# Patient Record
Sex: FEMALE | Race: WHITE | NOT HISPANIC OR LATINO
[De-identification: names, ages, dates, MRNs, and addresses within clinical notes are randomized per-mention and may not be internally consistent; named-entity substitution may affect disease eponyms.]

---

## 2016-08-02 RX ORDER — DOCUSATE SODIUM 100 MG
1 CAPSULE ORAL
Qty: 0 | Refills: 0 | DISCHARGE
Start: 2016-08-02

## 2017-01-06 ENCOUNTER — RX RENEWAL (OUTPATIENT)
Age: 83
End: 2017-01-06

## 2017-05-08 ENCOUNTER — RX RENEWAL (OUTPATIENT)
Age: 83
End: 2017-05-08

## 2017-05-26 ENCOUNTER — APPOINTMENT (OUTPATIENT)
Dept: ORTHOPEDIC SURGERY | Facility: CLINIC | Age: 83
End: 2017-05-26

## 2017-05-26 VITALS
HEART RATE: 72 BPM | SYSTOLIC BLOOD PRESSURE: 132 MMHG | DIASTOLIC BLOOD PRESSURE: 81 MMHG | BODY MASS INDEX: 26.66 KG/M2 | WEIGHT: 160 LBS | HEIGHT: 65 IN

## 2017-05-26 DIAGNOSIS — Z78.9 OTHER SPECIFIED HEALTH STATUS: ICD-10-CM

## 2017-05-26 DIAGNOSIS — Z82.62 FAMILY HISTORY OF OSTEOPOROSIS: ICD-10-CM

## 2017-05-26 RX ORDER — MELOXICAM 15 MG/1
15 TABLET ORAL DAILY
Qty: 30 | Refills: 1 | Status: ACTIVE | COMMUNITY
Start: 2017-05-26 | End: 1900-01-01

## 2017-09-01 ENCOUNTER — APPOINTMENT (OUTPATIENT)
Dept: ORTHOPEDIC SURGERY | Facility: CLINIC | Age: 83
End: 2017-09-01
Payer: MEDICARE

## 2017-09-01 VITALS
HEIGHT: 65 IN | WEIGHT: 160 LBS | BODY MASS INDEX: 26.66 KG/M2 | SYSTOLIC BLOOD PRESSURE: 156 MMHG | HEART RATE: 71 BPM | DIASTOLIC BLOOD PRESSURE: 78 MMHG

## 2017-09-01 DIAGNOSIS — M51.37 OTHER INTERVERTEBRAL DISC DEGENERATION, LUMBOSACRAL REGION: ICD-10-CM

## 2017-09-01 PROCEDURE — 99214 OFFICE O/P EST MOD 30 MIN: CPT

## 2017-09-01 PROCEDURE — 73502 X-RAY EXAM HIP UNI 2-3 VIEWS: CPT | Mod: RT

## 2019-06-28 ENCOUNTER — EMERGENCY (EMERGENCY)
Facility: HOSPITAL | Age: 85
LOS: 1 days | Discharge: HOME | End: 2019-06-28
Attending: EMERGENCY MEDICINE | Admitting: EMERGENCY MEDICINE
Payer: MEDICARE

## 2019-06-28 VITALS
TEMPERATURE: 99 F | RESPIRATION RATE: 15 BRPM | SYSTOLIC BLOOD PRESSURE: 151 MMHG | OXYGEN SATURATION: 95 % | WEIGHT: 160.06 LBS | HEIGHT: 65 IN | DIASTOLIC BLOOD PRESSURE: 73 MMHG | HEART RATE: 70 BPM

## 2019-06-28 DIAGNOSIS — H26.493 OTHER SECONDARY CATARACT, BILATERAL: Chronic | ICD-10-CM

## 2019-06-28 DIAGNOSIS — Z90.721 ACQUIRED ABSENCE OF OVARIES, UNILATERAL: Chronic | ICD-10-CM

## 2019-06-28 DIAGNOSIS — Z98.89 OTHER SPECIFIED POSTPROCEDURAL STATES: Chronic | ICD-10-CM

## 2019-06-28 DIAGNOSIS — R19.00 INTRA-ABDOMINAL AND PELVIC SWELLING, MASS AND LUMP, UNSPECIFIED SITE: Chronic | ICD-10-CM

## 2019-06-28 LAB
BASOPHILS # BLD AUTO: 0.04 K/UL — SIGNIFICANT CHANGE UP (ref 0–0.2)
BASOPHILS NFR BLD AUTO: 0.5 % — SIGNIFICANT CHANGE UP (ref 0–2)
EOSINOPHIL # BLD AUTO: 0.34 K/UL — SIGNIFICANT CHANGE UP (ref 0–0.5)
EOSINOPHIL NFR BLD AUTO: 4 % — SIGNIFICANT CHANGE UP (ref 0–6)
HCT VFR BLD CALC: 35.8 % — SIGNIFICANT CHANGE UP (ref 34.5–45)
HGB BLD-MCNC: 12 G/DL — SIGNIFICANT CHANGE UP (ref 11.5–15.5)
IMM GRANULOCYTES NFR BLD AUTO: 0.4 % — SIGNIFICANT CHANGE UP (ref 0–1.5)
LYMPHOCYTES # BLD AUTO: 2.01 K/UL — SIGNIFICANT CHANGE UP (ref 1–3.3)
LYMPHOCYTES # BLD AUTO: 23.9 % — SIGNIFICANT CHANGE UP (ref 13–44)
MCHC RBC-ENTMCNC: 29.9 PG — SIGNIFICANT CHANGE UP (ref 27–34)
MCHC RBC-ENTMCNC: 33.5 GM/DL — SIGNIFICANT CHANGE UP (ref 32–36)
MCV RBC AUTO: 89.1 FL — SIGNIFICANT CHANGE UP (ref 80–100)
MONOCYTES # BLD AUTO: 0.86 K/UL — SIGNIFICANT CHANGE UP (ref 0–0.9)
MONOCYTES NFR BLD AUTO: 10.2 % — SIGNIFICANT CHANGE UP (ref 2–14)
NEUTROPHILS # BLD AUTO: 5.13 K/UL — SIGNIFICANT CHANGE UP (ref 1.8–7.4)
NEUTROPHILS NFR BLD AUTO: 61 % — SIGNIFICANT CHANGE UP (ref 43–77)
NRBC # BLD: 0 /100 WBCS — SIGNIFICANT CHANGE UP (ref 0–0)
PLATELET # BLD AUTO: 224 K/UL — SIGNIFICANT CHANGE UP (ref 150–400)
RBC # BLD: 4.02 M/UL — SIGNIFICANT CHANGE UP (ref 3.8–5.2)
RBC # FLD: 14.1 % — SIGNIFICANT CHANGE UP (ref 10.3–14.5)
WBC # BLD: 8.41 K/UL — SIGNIFICANT CHANGE UP (ref 3.8–10.5)
WBC # FLD AUTO: 8.41 K/UL — SIGNIFICANT CHANGE UP (ref 3.8–10.5)

## 2019-06-28 PROCEDURE — 99284 EMERGENCY DEPT VISIT MOD MDM: CPT

## 2019-06-28 RX ORDER — SODIUM CHLORIDE 9 MG/ML
3 INJECTION INTRAMUSCULAR; INTRAVENOUS; SUBCUTANEOUS ONCE
Refills: 0 | Status: COMPLETED | OUTPATIENT
Start: 2019-06-28 | End: 2019-06-28

## 2019-06-28 RX ADMIN — SODIUM CHLORIDE 3 MILLILITER(S): 9 INJECTION INTRAMUSCULAR; INTRAVENOUS; SUBCUTANEOUS at 23:54

## 2019-06-28 NOTE — ED ADULT NURSE NOTE - NSIMPLEMENTINTERV_GEN_ALL_ED
Implemented All Fall with Harm Risk Interventions:  [place] to call system. Call bell, personal items and telephone within reach. Instruct patient to call for assistance. Room bathroom lighting operational. Non-slip footwear when patient is off stretcher. Physically safe environment: no spills, clutter or unnecessary equipment. Stretcher in lowest position, wheels locked, appropriate side rails in place. Provide visual cue, wrist band, yellow gown, etc. Monitor gait and stability. Monitor for mental status changes and reorient to person, place, and time. Review medications for side effects contributing to fall risk. Reinforce activity limits and safety measures with patient and family. Provide visual clues: red socks.

## 2019-06-29 VITALS
TEMPERATURE: 99 F | DIASTOLIC BLOOD PRESSURE: 70 MMHG | OXYGEN SATURATION: 95 % | RESPIRATION RATE: 16 BRPM | HEART RATE: 66 BPM | SYSTOLIC BLOOD PRESSURE: 132 MMHG

## 2019-06-29 LAB
ALBUMIN SERPL ELPH-MCNC: 3.7 G/DL — SIGNIFICANT CHANGE UP (ref 3.3–5)
ALP SERPL-CCNC: 94 U/L — SIGNIFICANT CHANGE UP (ref 40–120)
ALT FLD-CCNC: 26 U/L — SIGNIFICANT CHANGE UP (ref 12–78)
ANION GAP SERPL CALC-SCNC: 9 MMOL/L — SIGNIFICANT CHANGE UP (ref 5–17)
APTT BLD: 33.2 SEC — SIGNIFICANT CHANGE UP (ref 28.5–37)
AST SERPL-CCNC: 20 U/L — SIGNIFICANT CHANGE UP (ref 15–37)
BILIRUB SERPL-MCNC: 0.5 MG/DL — SIGNIFICANT CHANGE UP (ref 0.2–1.2)
BUN SERPL-MCNC: 18 MG/DL — SIGNIFICANT CHANGE UP (ref 7–23)
CALCIUM SERPL-MCNC: 8.5 MG/DL — SIGNIFICANT CHANGE UP (ref 8.5–10.1)
CHLORIDE SERPL-SCNC: 103 MMOL/L — SIGNIFICANT CHANGE UP (ref 96–108)
CO2 SERPL-SCNC: 25 MMOL/L — SIGNIFICANT CHANGE UP (ref 22–31)
CREAT SERPL-MCNC: 0.97 MG/DL — SIGNIFICANT CHANGE UP (ref 0.5–1.3)
GLUCOSE SERPL-MCNC: 104 MG/DL — HIGH (ref 70–99)
INR BLD: 1.52 RATIO — HIGH (ref 0.88–1.16)
LIDOCAIN IGE QN: 212 U/L — SIGNIFICANT CHANGE UP (ref 73–393)
OB PNL STL: POSITIVE
POTASSIUM SERPL-MCNC: 3.7 MMOL/L — SIGNIFICANT CHANGE UP (ref 3.5–5.3)
POTASSIUM SERPL-SCNC: 3.7 MMOL/L — SIGNIFICANT CHANGE UP (ref 3.5–5.3)
PROT SERPL-MCNC: 7.7 G/DL — SIGNIFICANT CHANGE UP (ref 6–8.3)
PROTHROM AB SERPL-ACNC: 17.5 SEC — HIGH (ref 10–12.9)
SODIUM SERPL-SCNC: 137 MMOL/L — SIGNIFICANT CHANGE UP (ref 135–145)

## 2019-06-29 PROCEDURE — 83690 ASSAY OF LIPASE: CPT

## 2019-06-29 PROCEDURE — 71250 CT THORAX DX C-: CPT

## 2019-06-29 PROCEDURE — 86900 BLOOD TYPING SEROLOGIC ABO: CPT

## 2019-06-29 PROCEDURE — 85027 COMPLETE CBC AUTOMATED: CPT

## 2019-06-29 PROCEDURE — 70450 CT HEAD/BRAIN W/O DYE: CPT

## 2019-06-29 PROCEDURE — 82272 OCCULT BLD FECES 1-3 TESTS: CPT

## 2019-06-29 PROCEDURE — 85730 THROMBOPLASTIN TIME PARTIAL: CPT

## 2019-06-29 PROCEDURE — 74176 CT ABD & PELVIS W/O CONTRAST: CPT

## 2019-06-29 PROCEDURE — 71250 CT THORAX DX C-: CPT | Mod: 26

## 2019-06-29 PROCEDURE — 85610 PROTHROMBIN TIME: CPT

## 2019-06-29 PROCEDURE — 86901 BLOOD TYPING SEROLOGIC RH(D): CPT

## 2019-06-29 PROCEDURE — 74176 CT ABD & PELVIS W/O CONTRAST: CPT | Mod: 26

## 2019-06-29 PROCEDURE — 80053 COMPREHEN METABOLIC PANEL: CPT

## 2019-06-29 PROCEDURE — 70450 CT HEAD/BRAIN W/O DYE: CPT | Mod: 26

## 2019-06-29 PROCEDURE — 36415 COLL VENOUS BLD VENIPUNCTURE: CPT

## 2019-06-29 PROCEDURE — 99284 EMERGENCY DEPT VISIT MOD MDM: CPT | Mod: 25

## 2019-06-29 PROCEDURE — 86850 RBC ANTIBODY SCREEN: CPT

## 2019-06-29 NOTE — ED PROVIDER NOTE - NSFOLLOWUPINSTRUCTIONS_ED_ALL_ED_FT
Return to the ED for any new or worsening symptoms  Take your medication as prescribed  Incentive spirometer 10 times an hour as instructed   Follow up with your PMD in 2-3 days for a recheck   Do not strain to have a bowel movement   Advance activity as tolerated

## 2019-06-29 NOTE — ED PROVIDER NOTE - CLINICAL SUMMARY MEDICAL DECISION MAKING FREE TEXT BOX
Pt with recent fall on Xarelto now with rectal bleeding and chest wall contusion.  Will obtain screening labs check stool for occult blood, and obtain CT head, chest/abd/pelvis.  Will monitor pt

## 2019-06-29 NOTE — ED PROVIDER NOTE - PROGRESS NOTE DETAILS
Results of labs and images reviewed, copy provided, all questions answered.  Pt and family aware of renal cyst which was present on prior imaging.  Bleeding has now stopped.  May be secondary to external hemorrhoid.  Pt stable at this time for discharge and outpatient follow up

## 2019-06-29 NOTE — ED PROVIDER NOTE - PSH
Abdominal wall mass  Exp La, Excison of abdominal mass - 8/2015  After cataract not obscuring vision, bilateral  b/l lens implants  S/P oophorectomy    S/P tonsillectomy

## 2019-06-29 NOTE — ED PROVIDER NOTE - PHYSICAL EXAMINATION
External non thrombosed hemorrhoid noted no active bleeding noted on rectal exam   No vaginal bleeding noted   Healing contusion to right lateral chest   Healing contusion to right orbital region no TTP to bilateral orbital regions   EOMI with no pain on eye movement

## 2019-06-29 NOTE — ED PROVIDER NOTE - OBJECTIVE STATEMENT
Pt is a 89 yo female who presents to the ED with a cc of rectal bleeding.  PMHx of Afib on Xarelto, GERD, hypothyroidism. Pt reports that several days ago she suffered a mechanical fall landing on her right side and striking her head.  Denies LOC.  Pt did not seek medical attention for this at the time.  Reports that today while having a bowel movement she noted several episodes of BRBPR.  She reports that this occurred 2-3 times today and so she became concerned and came to the ED for further work up.  Pt reports that on arrival to the ED she had a bowel movement with no blood.  Denies lightheadedness, N/V, SOB, abd pain.  Pt does have some right lateral chest wall pain from the fall several days ago.  Denies vaginal bleeding or discharge

## 2019-06-29 NOTE — ED ADULT NURSE REASSESSMENT NOTE - NS ED NURSE REASSESS COMMENT FT1
Incentive spirometer provided. Education and return demonstration for incentive spirometer use completed.

## 2020-07-23 ENCOUNTER — APPOINTMENT (OUTPATIENT)
Dept: ORTHOPEDIC SURGERY | Facility: CLINIC | Age: 86
End: 2020-07-23
Payer: MEDICARE

## 2020-07-23 VITALS — HEART RATE: 80 BPM | DIASTOLIC BLOOD PRESSURE: 68 MMHG | TEMPERATURE: 97.8 F | SYSTOLIC BLOOD PRESSURE: 154 MMHG

## 2020-07-23 PROCEDURE — 73502 X-RAY EXAM HIP UNI 2-3 VIEWS: CPT | Mod: RT

## 2020-07-23 PROCEDURE — 99214 OFFICE O/P EST MOD 30 MIN: CPT

## 2020-07-23 PROCEDURE — 73562 X-RAY EXAM OF KNEE 3: CPT | Mod: RT

## 2020-07-23 NOTE — CONSULT LETTER
[Dear  ___] : Dear  [unfilled], [Consult Letter:] : I had the pleasure of evaluating your patient, [unfilled]. [Please see my note below.] : Please see my note below. [Consult Closing:] : Thank you very much for allowing me to participate in the care of this patient.  If you have any questions, please do not hesitate to contact me. [FreeTextEntry2] : STEVEN GOLDBERG\par  [Sincerely,] : Sincerely, [FreeTextEntry3] : Juan M Carrillo MD\par \par ______________________________________________\par [facility]: Hip/Knee Arthroplasty\par [address], [address], [place] [address]\par (t) [phone]\par (f) [phone]\par

## 2020-07-23 NOTE — HISTORY OF PRESENT ILLNESS
[Constant] : ~He/She~ states the symptoms seem to be constant [Worsening] : worsening [Rest] : relieved by rest [Walking] : worsened by walking [de-identified] : Ms. TYRON FORTUNE is a 89 year old female presents with her son for evaluation, for a new onset of right knee pain. She notes she suffered a fall when getting out of bed on Sunday night. She notes she does not recall the fall. She was seen in urgent care on Monday, and was diagnosed with a probable tibial plateau fracture. She has continued pain localized to the medial aspect of the knee. She has been ambulating with a walker, with limited walking distance. She is status post right total hip replacement 8/2016, with no pain to the hip. \par

## 2020-07-23 NOTE — PHYSICAL EXAM
[de-identified] : On general examination the patient is adequately groomed and nourished. The vital parameters are as recorded. \par There is no lymphedema or diffuse swelling, no varicose veins, no skin warmth/erythema/scars/swelling, no ulcers and no palpable lymph nodes or masses in both lower extremities. Bilateral pedal pulses are well palpable.\par Upper Extremity:\par Both right and left upper extremities are unremarkable in terms of skin rash, lesions, pigmentation, redness, tenderness, swelling, joint instability, abnormal deformity or crepitus. The overall range of motion, sensation, motor tone and strength testing are normal.\par \par right  hip: There is a well healed scar of surgery with no significant swelling, redness, or tenderness. Range of motion of the hip: active SLR of 30 degrees and hip flexion to 60 degrees Abduction 30 degrees adduction 15 degrees external rotation 30 degrees internal rotation 15 degrees with hip abductor extensor power grade +4. \par \par Knee Exam\par The gait is right stiff knee antalgic.\par Knee alignment:   normal\par Both knees demonstrate no scars and the skin has no warmth, erythema, swelling or tenderness. \par Both knees have a range of motion of\par Extension:                    left 0 degrees             right -5 degrees\par Flexion:                                   left 125 degrees          right 90 degrees\par the range of motion of the right knee is limited to pain. \par Right Knee: There is medial joint line tenderness, tenderness along the medial tibial plateau. There is mild effusion. \par There is no mediolateral laxity and no anteroposterior instability. \par Patella compression test is negative and patellofemoral tracking is normal with no lateral subluxation, apprehension or instability. \par Left knee quadriceps and hamstrings power is 5. left Knee Exam is normal.\par Right knee quadriceps and hamstrings power is 4+.\par \par  [de-identified] : The following radiographs were ordered and read by me during this patients visit. I reviewed each radiograph in detail with the patient and discussed the findings as highlighted below.\par AP and false profile views of the right hip and AP view of the pelvis taken today reveal a well fixed and aligned right total hip replacement with no signs of mechanical failure or periprosthetic fracture.\par AP lateral and skyline view of the right knee reveal nondisplaced tibial plateau fracture. \par

## 2020-07-23 NOTE — DISCUSSION/SUMMARY
[de-identified] : Stable right total hip replacement, with right knee medial tibial plateau. \par The patient was informed of the findings and reassured her hip replacement remains stable. She has suffered a right knee tibial plateau fracture following her fall. She was recommended for conservative management of the fracture at this time, as it is non displaced. She will continue with WBAT with the walker, and was recommended for fitting for a bladimir brace to be worn at all times. She has been provided with a prescription for the brace, with range of motion set at 0-70. \par Follow-up appointment was recommended in six weeks for repeat xrays to assess for healing. \par \par

## 2020-08-25 RX ORDER — TRAMADOL HYDROCHLORIDE 50 MG/1
50 TABLET, COATED ORAL 3 TIMES DAILY
Qty: 21 | Refills: 0 | Status: ACTIVE | OUTPATIENT
Start: 2020-08-06

## 2020-08-31 ENCOUNTER — APPOINTMENT (OUTPATIENT)
Dept: ORTHOPEDIC SURGERY | Facility: CLINIC | Age: 86
End: 2020-08-31
Payer: MEDICARE

## 2020-08-31 ENCOUNTER — INPATIENT (INPATIENT)
Facility: HOSPITAL | Age: 86
LOS: 1 days | Discharge: NURSING, REHAB OR LONG-TERM CARE | End: 2020-09-02
Attending: HOSPITALIST | Admitting: HOSPITALIST
Payer: MEDICARE

## 2020-08-31 VITALS
DIASTOLIC BLOOD PRESSURE: 75 MMHG | SYSTOLIC BLOOD PRESSURE: 159 MMHG | RESPIRATION RATE: 16 BRPM | OXYGEN SATURATION: 95 % | HEART RATE: 78 BPM | TEMPERATURE: 98 F

## 2020-08-31 VITALS
HEART RATE: 76 BPM | OXYGEN SATURATION: 92 % | HEIGHT: 65 IN | WEIGHT: 168 LBS | SYSTOLIC BLOOD PRESSURE: 168 MMHG | BODY MASS INDEX: 27.99 KG/M2 | DIASTOLIC BLOOD PRESSURE: 74 MMHG

## 2020-08-31 DIAGNOSIS — M25.551 PAIN IN RIGHT HIP: ICD-10-CM

## 2020-08-31 DIAGNOSIS — H26.493 OTHER SECONDARY CATARACT, BILATERAL: Chronic | ICD-10-CM

## 2020-08-31 DIAGNOSIS — Z90.721 ACQUIRED ABSENCE OF OVARIES, UNILATERAL: Chronic | ICD-10-CM

## 2020-08-31 DIAGNOSIS — M25.561 PAIN IN RIGHT KNEE: ICD-10-CM

## 2020-08-31 DIAGNOSIS — R19.00 INTRA-ABDOMINAL AND PELVIC SWELLING, MASS AND LUMP, UNSPECIFIED SITE: Chronic | ICD-10-CM

## 2020-08-31 DIAGNOSIS — Z98.89 OTHER SPECIFIED POSTPROCEDURAL STATES: Chronic | ICD-10-CM

## 2020-08-31 DIAGNOSIS — M25.562 PAIN IN RIGHT KNEE: ICD-10-CM

## 2020-08-31 PROCEDURE — 73552 X-RAY EXAM OF FEMUR 2/>: CPT | Mod: 26,RT

## 2020-08-31 PROCEDURE — 93970 EXTREMITY STUDY: CPT | Mod: 26

## 2020-08-31 PROCEDURE — 99214 OFFICE O/P EST MOD 30 MIN: CPT | Mod: PD

## 2020-08-31 PROCEDURE — 72192 CT PELVIS W/O DYE: CPT | Mod: 26

## 2020-08-31 PROCEDURE — 72170 X-RAY EXAM OF PELVIS: CPT | Mod: PD

## 2020-08-31 PROCEDURE — 73502 X-RAY EXAM HIP UNI 2-3 VIEWS: CPT | Mod: 26,RT

## 2020-08-31 PROCEDURE — 70450 CT HEAD/BRAIN W/O DYE: CPT | Mod: 26

## 2020-08-31 PROCEDURE — 99285 EMERGENCY DEPT VISIT HI MDM: CPT

## 2020-08-31 PROCEDURE — 73700 CT LOWER EXTREMITY W/O DYE: CPT | Mod: 26,RT

## 2020-08-31 PROCEDURE — 73560 X-RAY EXAM OF KNEE 1 OR 2: CPT | Mod: RT,PD

## 2020-08-31 PROCEDURE — 72125 CT NECK SPINE W/O DYE: CPT | Mod: 26

## 2020-08-31 PROCEDURE — 76882 US LMTD JT/FCL EVL NVASC XTR: CPT | Mod: 26,RT

## 2020-08-31 RX ORDER — HALOPERIDOL DECANOATE 100 MG/ML
2 INJECTION INTRAMUSCULAR ONCE
Refills: 0 | Status: COMPLETED | OUTPATIENT
Start: 2020-08-31 | End: 2020-08-31

## 2020-08-31 RX ORDER — MIDAZOLAM HYDROCHLORIDE 1 MG/ML
1 INJECTION, SOLUTION INTRAMUSCULAR; INTRAVENOUS ONCE
Refills: 0 | Status: DISCONTINUED | OUTPATIENT
Start: 2020-08-31 | End: 2020-08-31

## 2020-08-31 RX ORDER — ONDANSETRON 8 MG/1
4 TABLET, FILM COATED ORAL ONCE
Refills: 0 | Status: COMPLETED | OUTPATIENT
Start: 2020-08-31 | End: 2020-08-31

## 2020-08-31 RX ORDER — MORPHINE SULFATE 50 MG/1
2 CAPSULE, EXTENDED RELEASE ORAL ONCE
Refills: 0 | Status: DISCONTINUED | OUTPATIENT
Start: 2020-08-31 | End: 2020-08-31

## 2020-08-31 RX ORDER — MORPHINE SULFATE 50 MG/1
4 CAPSULE, EXTENDED RELEASE ORAL ONCE
Refills: 0 | Status: DISCONTINUED | OUTPATIENT
Start: 2020-08-31 | End: 2020-08-31

## 2020-08-31 RX ADMIN — MORPHINE SULFATE 2 MILLIGRAM(S): 50 CAPSULE, EXTENDED RELEASE ORAL at 15:03

## 2020-08-31 RX ADMIN — MIDAZOLAM HYDROCHLORIDE 1 MILLIGRAM(S): 1 INJECTION, SOLUTION INTRAMUSCULAR; INTRAVENOUS at 18:51

## 2020-08-31 RX ADMIN — MORPHINE SULFATE 4 MILLIGRAM(S): 50 CAPSULE, EXTENDED RELEASE ORAL at 21:04

## 2020-08-31 RX ADMIN — MORPHINE SULFATE 4 MILLIGRAM(S): 50 CAPSULE, EXTENDED RELEASE ORAL at 20:34

## 2020-08-31 RX ADMIN — ONDANSETRON 4 MILLIGRAM(S): 8 TABLET, FILM COATED ORAL at 15:03

## 2020-08-31 RX ADMIN — HALOPERIDOL DECANOATE 2 MILLIGRAM(S): 100 INJECTION INTRAMUSCULAR at 19:37

## 2020-08-31 RX ADMIN — MIDAZOLAM HYDROCHLORIDE 1 MILLIGRAM(S): 1 INJECTION, SOLUTION INTRAMUSCULAR; INTRAVENOUS at 18:21

## 2020-08-31 NOTE — ED PROVIDER NOTE - MUSCULOSKELETAL MINIMAL EXAM
RANGE OF MOTION LIMITED/right hip held in internal rotation, TTP at hip. Sensation intact. 2+ pedal pulse

## 2020-08-31 NOTE — HISTORY OF PRESENT ILLNESS
[FreeTextEntry1] : This is a 89-year-old female with a history of A. fib on Xarelto, recent right tibial plateau fracture treated nonoperatively (7/23/2020), status post right anterior total hip arthroplasty (8/2016, Dr. Carrillo) who is presenting with acute right-sided hip pain after she rolled out of bed this morning landing on her right hip.  After the fall it is unclear if she was able to walk.  She presents today in a wheelchair.  She has severe pain over the right hip and is holding her right lower extremity and significant internal rotation.  The pain radiates into her groin.  She has mild pain over her right knee.  She denies any other injuries or head trauma.

## 2020-08-31 NOTE — PHYSICAL EXAM
[FreeTextEntry1] : General: Patient alert and oriented to person, place and time. Sitting in a wheelchair, with RLE internally rotated, in pain.\par Psychiatric: normal mood and affect, no abnormal movements or speech patterns.\par Eyes: vision intact without deficits, sclera and conjunctiva were normal, pupils were equal in size. \par ENT: Ears and nose were normal in appearance. No thyromegaly.\par Lymph: no enlarged nodes, no lymphedema in extremity.\par Respiratory: Normal respiratory rhythm and effort. No wheezing detected without auscultation. No shortness of breath or respiratory distress.\par Cardiac: no cardiac related leg swelling, 2+ peripheral pulses.\par Neurology: normal gross sensation in extremities to light touch.\par Abdomen: soft, non-tender, tympanic, no masses.\par \par RLE:\par \par RLE held in internal rotation. Anterior incisional scar well healed. Diffuse RLE swelling is present, unclear if this is at baseline. No ecchymosis. \par +TTP over GT and with any attempts at hip ROM. \par There is a large palpable area of fluctuance over the medial proximal tibia area, possibly hematoma.  No knee pain with ROM. Minimal TTP over medial plateau. No varus/valgus instability.No TTP over quadriceps/patellar tendon. No TTP over tibial tubercle or pes insertion. \par EHL/FHL/GS/TA 5/5. S/S/SP/DP/T SILT. Toes warm, BCR. Compartments soft.\par Equal limb lengths.\par Gait: Unable to bear weight d/t pain.

## 2020-08-31 NOTE — DATA REVIEWED
[de-identified] : 8/31/2020–x-rays:\par \par X-rays were very difficult to perform due to severe right hip pain, preventing the appropriate angle to be achieved. \par \par AP Pelvis: There is no hip dislocation.  There are no obvious fractures.  The acetabular component is most likely stable, however this is unclear due to the different angles of x-rays when compared to prior x-rays.\par \par Right knee AP x-ray: There is a more obvious medial metaphyseal fracture line over the tibial plateau at the site of prior injury.  There is no interval displacement.

## 2020-08-31 NOTE — DISCUSSION/SUMMARY
[de-identified] : This is a 89-year-old female with acute right-sided hip pain after a fall.  While initial x-rays do not demonstrate an obvious fracture or dislocation, it is unclear if there is any component instability due to the difficulty in obtaining the correct x-rays.  I have therefore recommended that she seek further evaluation at a local ER where they will perform a CT scan.  In addition I have also recommended a Doppler of the right lower extremity to rule out DVT and to assess the possible right shin hematoma.  She should be nonweightbearing on the right lower extremity pending further assessment.  I will notify her surgeon, Dr. Carrillo.

## 2020-08-31 NOTE — ED PROVIDER NOTE - OBJECTIVE STATEMENT
89yo F p/w right hip pain/injury s/p fall today at home. Patient endorses no further symptoms at this time. History of dementia, poor historian.

## 2020-08-31 NOTE — ED ADULT NURSE NOTE - INTERVENTIONS DEFINITIONS
[place] to call system/Stretcher in lowest position, wheels locked, appropriate side rails in place/Monitor gait and stability/Review medications for side effects contributing to fall risk/Instruct patient to call for assistance/Physically safe environment: no spills, clutter or unnecessary equipment/Reinforce activity limits and safety measures with patient and family/Monitor for mental status changes and reorient to person, place, and time/Room bathroom lighting operational/Non-slip footwear when patient is off stretcher

## 2020-08-31 NOTE — ED PROVIDER NOTE - CLINICAL SUMMARY MEDICAL DECISION MAKING FREE TEXT BOX
Patient presents with right hip pain s/p fall at home today. x-rays ordered for fracture vs. dislocation. CT head and c-spine for unwitness fall to rule out further injury. Pain medications given, plan to reassess. Dispo per imaging.

## 2020-08-31 NOTE — CONSULT NOTE ADULT - SUBJECTIVE AND OBJECTIVE BOX
89 y/o Female A&O x1 with PMHx of Afib on Xarelto, HTN, GERD, hypothyroidism, OA, right total hip arthroplasty in 2016 with Dr. Carrillo presents to [facility] with complaints of right hip pain s/p mechanical fall with inability to ambulate  Pt states she was in bed and tried to get out when she fell onto right hip. Says she has been falling more frequently but then denies th States she lives alone, and ambulates with a walker at baseline. Denies HS/LOC. Denies numbness/tingling. Denies fever/chills. Denies pain/injury elsewhere. Per patient, when asked who can make medical decisions for her, she said her children Poly and Clarke.        PAST MEDICAL & SURGICAL HISTORY:  Hypothyroidism  GERD (gastroesophageal reflux disease)  Afib  Sarcoma of omentum  OA (osteoarthritis)  Degenerative disc disease, lumbar  Degenerative disc disease, cervical  HTN (hypertension)  Hypothyroidism, adult  GERD (gastroesophageal reflux disease)  Atrial fibrillation  Abdominal wall mass: Exp La, Excison of abdominal mass - 8/2015  After cataract not obscuring vision, bilateral: b/l lens implants  S/P oophorectomy  S/P tonsillectomy    MEDICATIONS  (STANDING):  Allergies: Keflex (Other)    Vital Signs Last 24 Hrs  T(C): 36.6 (08-31-20 @ 14:49), Max: 36.7 (08-31-20 @ 14:08)  T(F): 97.8 (08-31-20 @ 14:49), Max: 98 (08-31-20 @ 14:08)  HR: 78 (08-31-20 @ 15:02) (78 - 79)  BP: 146/69 (08-31-20 @ 15:02) (146/69 - 159/75)  BP(mean): --  RR: 16 (08-31-20 @ 14:49) (16 - 16)  SpO2: 99% (08-31-20 @ 14:49) (95% - 99%)  Imaging: XR demonstates R/L hip fracture    Physical Exam  General: NAD, Alert, Awake and oriented x1  Head: NCAT without abrasions, lacerations, or ecchymosis to head, face, or scalp    HIPS and PELVIS: Unable to SLR Right Hip.     RIGHT LE: No open skin. No deformities or other signs of trauma at  knee, lower leg, ankle or foot. Pt unable to straighten leg, keeping knee flexed and slightly internally rotated. Unable to actively SLR. Sensation intact to light touch, edematous in the right lower extremity, 1+ DP pulses. Compartments soft and compressible.     LEFT LE: No open skin. No deformities or other signs of trauma at  knee, lower leg, ankle or foot. Full baseline painless ROM at ankle and toes with. Negative log-roll and heel strike. Able to actively SLR. Sensation intact to light touch, edematous in the right lower extremity, 1+ DP pulses. Compartments soft and compressible.     B/L UE: No open skin. No obvious deformities or other signs of trauma at shoulder, upper arm, elbow, forearm, wrist or hand.        A/P: 89 y/o Female with R hip pain s/p mechanical fall  -Pain control/analgesia  -NWB R LE, bedrest  -FU labs  -FU XR right hip/pelvis, femur  -FU CT pelvis  -FU b/l dopplers  -Will discuss with Orthopedic attending once imaging is done  -Notify Orthopedics with any other questions 89 y/o Female A&O x1 with PMHx of Afib on Xarelto, HTN, GERD, hypothyroidism, OA, right total hip arthroplasty in 2016 with Dr. Carrillo presents to [facility] with complaints of right hip pain s/p mechanical fall with inability to ambulate  Pt states she was in bed and tried to get out when she fell onto right hip. Says she has been falling more frequently but then denies th States she lives alone, and ambulates with a walker at baseline. Denies HS/LOC. Denies numbness/tingling. Denies fever/chills. Denies pain/injury elsewhere. Per patient, when asked who can make medical decisions for her, she said her children Poly and Clarke.        PAST MEDICAL & SURGICAL HISTORY:  Hypothyroidism  GERD (gastroesophageal reflux disease)  Afib  Sarcoma of omentum  OA (osteoarthritis)  Degenerative disc disease, lumbar  Degenerative disc disease, cervical  HTN (hypertension)  Hypothyroidism, adult  GERD (gastroesophageal reflux disease)  Atrial fibrillation  Abdominal wall mass: Exp La, Excison of abdominal mass - 8/2015  After cataract not obscuring vision, bilateral: b/l lens implants  S/P oophorectomy  S/P tonsillectomy    MEDICATIONS  (STANDING):  Allergies: Keflex (Other)    Vital Signs Last 24 Hrs  T(C): 36.6 (08-31-20 @ 14:49), Max: 36.7 (08-31-20 @ 14:08)  T(F): 97.8 (08-31-20 @ 14:49), Max: 98 (08-31-20 @ 14:08)  HR: 78 (08-31-20 @ 15:02) (78 - 79)  BP: 146/69 (08-31-20 @ 15:02) (146/69 - 159/75)  BP(mean): --  RR: 16 (08-31-20 @ 14:49) (16 - 16)  SpO2: 99% (08-31-20 @ 14:49) (95% - 99%)  Imaging: XR demonstates R/L hip fracture    Physical Exam  General: NAD, Alert, Awake and oriented x1  Head: NCAT without abrasions, lacerations, or ecchymosis to head, face, or scalp    HIPS and PELVIS: Unable to SLR Right Hip.     RIGHT LE: No open skin. No deformities or other signs of trauma at  knee, lower leg, ankle or foot. Pt unable to straighten leg, keeping knee flexed and slightly internally rotated. Unable to actively SLR. Sensation intact to light touch, edematous in the right lower extremity, 1+ DP pulses. Compartments soft and compressible.     LEFT LE: No open skin. No deformities or other signs of trauma at  knee, lower leg, ankle or foot. Full baseline painless ROM at ankle and toes with. Negative log-roll and heel strike. Able to actively SLR. Sensation intact to light touch, edematous in the right lower extremity, 1+ DP pulses. Compartments soft and compressible.     B/L UE: No open skin. No obvious deformities or other signs of trauma at shoulder, upper arm, elbow, forearm, wrist or hand.

## 2020-08-31 NOTE — ED ADULT NURSE NOTE - CHIEF COMPLAINT QUOTE
ems states pt had fall 2 days ago, landing on right side. pt was seen by PMD today and sent to ED. pt noted to have internal rotation of right leg, pedal pulse present.  pt on Eliquis. hx of dementia. pt oriented to person/place.

## 2020-08-31 NOTE — ED ADULT NURSE NOTE - OBJECTIVE STATEMENT
Pt received, aox3, states uses a walker to ambulate on baseline. PT states she fell 2 days ago. Denies losing consciousness and dizziness. Pt hs hx of Afib, dementia. Pt does show signs of confusion. Pt states she in on Xarelto for Afib. Pt complaining of 10/10 right hip pain as well as lower back pain which pt states is chronic. Pt breathing even and unlabored, saturating 100% on RA. Pt abdomen soft and nontender. Pt has +3 b/l lower extremities pitting edema. Pt right leg is internally rotated, pedal pulses present. Pt placed on cardiac monitor, reading sinus. Pt received, aox3, states uses a walker to ambulate on baseline. PT states she fell 2 days ago. Denies losing consciousness and dizziness. Pt hs hx of Afib, dementia. Pt does show signs of confusion. Pt states she in on Xarelto for Afib. Pt complaining of 10/10 right hip pain as well as lower back pain which pt states is chronic. Pt breathing even and unlabored, saturating 100% on RA. Pt abdomen soft and nontender. Pt has +3 b/l lower extremities pitting edema. Pt right leg is internally rotated, pedal pulses present. Pt placed on cardiac monitor, reading sinus. 20G RAC

## 2020-08-31 NOTE — ED ADULT NURSE REASSESSMENT NOTE - NS ED NURSE REASSESS COMMENT FT1
Pt agitated and yelling at nursing staff. Pt unable to lay flat for CT scan despite medications as per MD orders. Pt unable to be re-directed at this time. Pt NSR on the monitor. WIll continue to monitor.

## 2020-08-31 NOTE — ED ADULT NURSE REASSESSMENT NOTE - NS ED NURSE REASSESS COMMENT FT1
Pt unable to lay still for the CT scan. MD made aware. Per Md order, pt given 1mg of Versed IVP. Pt on the monitor, reading sinus.

## 2020-08-31 NOTE — CONSULT NOTE ADULT - ASSESSMENT
A/P: 89 y/o Female with R Right nondisplaced tibial plateau fx  -Pain control/analgesia  -NWB RLE in BJKI  -FU XR right hip/pelvis, femur  -FU CT RLE  -FU b/l dopplers  - PT/OT  - no acute orthopedic surgical intervention at this time A/P: 89 y/o Female with R Right nondisplaced tibial plateau fx  -Pain control/analgesia  -NWB RLE  -FU XR right hip/pelvis, femur  -FU CT RLE  -FU b/l dopplers  - PT/OT  - no acute orthopedic surgical intervention at this time

## 2020-08-31 NOTE — ED PROVIDER NOTE - PMH
Afib    Atrial fibrillation    Degenerative disc disease, cervical    Degenerative disc disease, lumbar    GERD (gastroesophageal reflux disease)    GERD (gastroesophageal reflux disease)    HTN (hypertension)    Hypothyroidism    Hypothyroidism, adult    OA (osteoarthritis)    Sarcoma of omentum

## 2020-09-01 DIAGNOSIS — I48.0 PAROXYSMAL ATRIAL FIBRILLATION: ICD-10-CM
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
## 2020-09-01 DIAGNOSIS — I10 ESSENTIAL (PRIMARY) HYPERTENSION: ICD-10-CM

## 2020-09-01 DIAGNOSIS — G30.1 ALZHEIMER'S DISEASE WITH LATE ONSET: ICD-10-CM

## 2020-09-01 LAB
ALBUMIN SERPL ELPH-MCNC: 4 G/DL — SIGNIFICANT CHANGE UP (ref 3.3–5)
ALP SERPL-CCNC: 81 U/L — SIGNIFICANT CHANGE UP (ref 40–120)
ALT FLD-CCNC: 14 U/L — SIGNIFICANT CHANGE UP (ref 4–33)
ANION GAP SERPL CALC-SCNC: 12 MMO/L — SIGNIFICANT CHANGE UP (ref 7–14)
ANION GAP SERPL CALC-SCNC: 13 MMO/L — SIGNIFICANT CHANGE UP (ref 7–14)
APPEARANCE UR: CLEAR — SIGNIFICANT CHANGE UP
AST SERPL-CCNC: 19 U/L — SIGNIFICANT CHANGE UP (ref 4–32)
BACTERIA # UR AUTO: SIGNIFICANT CHANGE UP
BASOPHILS # BLD AUTO: 0.05 K/UL — SIGNIFICANT CHANGE UP (ref 0–0.2)
BASOPHILS NFR BLD AUTO: 0.6 % — SIGNIFICANT CHANGE UP (ref 0–2)
BILIRUB SERPL-MCNC: 0.5 MG/DL — SIGNIFICANT CHANGE UP (ref 0.2–1.2)
BILIRUB UR-MCNC: NEGATIVE — SIGNIFICANT CHANGE UP
BLOOD UR QL VISUAL: HIGH
BUN SERPL-MCNC: 16 MG/DL — SIGNIFICANT CHANGE UP (ref 7–23)
BUN SERPL-MCNC: 23 MG/DL — SIGNIFICANT CHANGE UP (ref 7–23)
CALCIUM SERPL-MCNC: 9 MG/DL — SIGNIFICANT CHANGE UP (ref 8.4–10.5)
CALCIUM SERPL-MCNC: 9.2 MG/DL — SIGNIFICANT CHANGE UP (ref 8.4–10.5)
CHLORIDE SERPL-SCNC: 100 MMOL/L — SIGNIFICANT CHANGE UP (ref 98–107)
CHLORIDE SERPL-SCNC: 101 MMOL/L — SIGNIFICANT CHANGE UP (ref 98–107)
CO2 SERPL-SCNC: 25 MMOL/L — SIGNIFICANT CHANGE UP (ref 22–31)
CO2 SERPL-SCNC: 25 MMOL/L — SIGNIFICANT CHANGE UP (ref 22–31)
COLOR SPEC: YELLOW — SIGNIFICANT CHANGE UP
CREAT SERPL-MCNC: 0.71 MG/DL — SIGNIFICANT CHANGE UP (ref 0.5–1.3)
CREAT SERPL-MCNC: 0.89 MG/DL — SIGNIFICANT CHANGE UP (ref 0.5–1.3)
EOSINOPHIL # BLD AUTO: 0.28 K/UL — SIGNIFICANT CHANGE UP (ref 0–0.5)
EOSINOPHIL NFR BLD AUTO: 3.3 % — SIGNIFICANT CHANGE UP (ref 0–6)
GLUCOSE SERPL-MCNC: 101 MG/DL — HIGH (ref 70–99)
GLUCOSE SERPL-MCNC: 109 MG/DL — HIGH (ref 70–99)
GLUCOSE UR-MCNC: NEGATIVE — SIGNIFICANT CHANGE UP
HCT VFR BLD CALC: 33.4 % — LOW (ref 34.5–45)
HCT VFR BLD CALC: 35.5 % — SIGNIFICANT CHANGE UP (ref 34.5–45)
HGB BLD-MCNC: 11.2 G/DL — LOW (ref 11.5–15.5)
HGB BLD-MCNC: 11.9 G/DL — SIGNIFICANT CHANGE UP (ref 11.5–15.5)
HYALINE CASTS # UR AUTO: NEGATIVE — SIGNIFICANT CHANGE UP
IMM GRANULOCYTES NFR BLD AUTO: 0.4 % — SIGNIFICANT CHANGE UP (ref 0–1.5)
KETONES UR-MCNC: SIGNIFICANT CHANGE UP
LEUKOCYTE ESTERASE UR-ACNC: SIGNIFICANT CHANGE UP
LYMPHOCYTES # BLD AUTO: 1.83 K/UL — SIGNIFICANT CHANGE UP (ref 1–3.3)
LYMPHOCYTES # BLD AUTO: 21.7 % — SIGNIFICANT CHANGE UP (ref 13–44)
MAGNESIUM SERPL-MCNC: 2.3 MG/DL — SIGNIFICANT CHANGE UP (ref 1.6–2.6)
MCHC RBC-ENTMCNC: 28.1 PG — SIGNIFICANT CHANGE UP (ref 27–34)
MCHC RBC-ENTMCNC: 28.9 PG — SIGNIFICANT CHANGE UP (ref 27–34)
MCHC RBC-ENTMCNC: 33.5 % — SIGNIFICANT CHANGE UP (ref 32–36)
MCHC RBC-ENTMCNC: 33.5 % — SIGNIFICANT CHANGE UP (ref 32–36)
MCV RBC AUTO: 83.7 FL — SIGNIFICANT CHANGE UP (ref 80–100)
MCV RBC AUTO: 86.1 FL — SIGNIFICANT CHANGE UP (ref 80–100)
MONOCYTES # BLD AUTO: 0.89 K/UL — SIGNIFICANT CHANGE UP (ref 0–0.9)
MONOCYTES NFR BLD AUTO: 10.6 % — SIGNIFICANT CHANGE UP (ref 2–14)
NEUTROPHILS # BLD AUTO: 5.35 K/UL — SIGNIFICANT CHANGE UP (ref 1.8–7.4)
NEUTROPHILS NFR BLD AUTO: 63.4 % — SIGNIFICANT CHANGE UP (ref 43–77)
NITRITE UR-MCNC: POSITIVE — HIGH
NRBC # FLD: 0 K/UL — SIGNIFICANT CHANGE UP (ref 0–0)
NRBC # FLD: 0 K/UL — SIGNIFICANT CHANGE UP (ref 0–0)
PH UR: 6 — SIGNIFICANT CHANGE UP (ref 5–8)
PHOSPHATE SERPL-MCNC: 4 MG/DL — SIGNIFICANT CHANGE UP (ref 2.5–4.5)
PLATELET # BLD AUTO: 239 K/UL — SIGNIFICANT CHANGE UP (ref 150–400)
PLATELET # BLD AUTO: 249 K/UL — SIGNIFICANT CHANGE UP (ref 150–400)
PMV BLD: 10.1 FL — SIGNIFICANT CHANGE UP (ref 7–13)
PMV BLD: 10.8 FL — SIGNIFICANT CHANGE UP (ref 7–13)
POTASSIUM SERPL-MCNC: 3.6 MMOL/L — SIGNIFICANT CHANGE UP (ref 3.5–5.3)
POTASSIUM SERPL-MCNC: 3.9 MMOL/L — SIGNIFICANT CHANGE UP (ref 3.5–5.3)
POTASSIUM SERPL-SCNC: 3.6 MMOL/L — SIGNIFICANT CHANGE UP (ref 3.5–5.3)
POTASSIUM SERPL-SCNC: 3.9 MMOL/L — SIGNIFICANT CHANGE UP (ref 3.5–5.3)
PROT SERPL-MCNC: 7.1 G/DL — SIGNIFICANT CHANGE UP (ref 6–8.3)
PROT UR-MCNC: 20 — SIGNIFICANT CHANGE UP
RBC # BLD: 3.88 M/UL — SIGNIFICANT CHANGE UP (ref 3.8–5.2)
RBC # BLD: 4.24 M/UL — SIGNIFICANT CHANGE UP (ref 3.8–5.2)
RBC # FLD: 15.2 % — HIGH (ref 10.3–14.5)
RBC # FLD: 15.4 % — HIGH (ref 10.3–14.5)
RBC CASTS # UR COMP ASSIST: HIGH (ref 0–?)
SARS-COV-2 RNA SPEC QL NAA+PROBE: SIGNIFICANT CHANGE UP
SODIUM SERPL-SCNC: 137 MMOL/L — SIGNIFICANT CHANGE UP (ref 135–145)
SODIUM SERPL-SCNC: 139 MMOL/L — SIGNIFICANT CHANGE UP (ref 135–145)
SP GR SPEC: 1.02 — SIGNIFICANT CHANGE UP (ref 1–1.04)
SQUAMOUS # UR AUTO: SIGNIFICANT CHANGE UP
TROPONIN T, HIGH SENSITIVITY: 22 NG/L — SIGNIFICANT CHANGE UP (ref ?–14)
TROPONIN T, HIGH SENSITIVITY: 23 NG/L — SIGNIFICANT CHANGE UP (ref ?–14)
UROBILINOGEN FLD QL: NORMAL — SIGNIFICANT CHANGE UP
WBC # BLD: 7.09 K/UL — SIGNIFICANT CHANGE UP (ref 3.8–10.5)
WBC # BLD: 8.43 K/UL — SIGNIFICANT CHANGE UP (ref 3.8–10.5)
WBC # FLD AUTO: 7.09 K/UL — SIGNIFICANT CHANGE UP (ref 3.8–10.5)
WBC # FLD AUTO: 8.43 K/UL — SIGNIFICANT CHANGE UP (ref 3.8–10.5)
WBC UR QL: HIGH (ref 0–?)

## 2020-09-01 PROCEDURE — 99223 1ST HOSP IP/OBS HIGH 75: CPT

## 2020-09-01 RX ORDER — OXYCODONE HYDROCHLORIDE 5 MG/1
5 TABLET ORAL EVERY 4 HOURS
Refills: 0 | Status: DISCONTINUED | OUTPATIENT
Start: 2020-09-01 | End: 2020-09-02

## 2020-09-01 RX ORDER — LEVOTHYROXINE SODIUM 125 MCG
1 TABLET ORAL
Qty: 0 | Refills: 0 | DISCHARGE

## 2020-09-01 RX ORDER — POTASSIUM CHLORIDE 20 MEQ
20 PACKET (EA) ORAL ONCE
Refills: 0 | Status: COMPLETED | OUTPATIENT
Start: 2020-09-01 | End: 2020-09-01

## 2020-09-01 RX ORDER — RIVAROXABAN 15 MG-20MG
15 KIT ORAL DAILY
Refills: 0 | Status: DISCONTINUED | OUTPATIENT
Start: 2020-09-01 | End: 2020-09-02

## 2020-09-01 RX ORDER — ACETAMINOPHEN 500 MG
650 TABLET ORAL EVERY 6 HOURS
Refills: 0 | Status: DISCONTINUED | OUTPATIENT
Start: 2020-09-01 | End: 2020-09-02

## 2020-09-01 RX ADMIN — Medication 650 MILLIGRAM(S): at 11:02

## 2020-09-01 RX ADMIN — Medication 20 MILLIEQUIVALENT(S): at 15:05

## 2020-09-01 RX ADMIN — RIVAROXABAN 15 MILLIGRAM(S): KIT at 12:06

## 2020-09-01 RX ADMIN — OXYCODONE HYDROCHLORIDE 5 MILLIGRAM(S): 5 TABLET ORAL at 16:00

## 2020-09-01 RX ADMIN — OXYCODONE HYDROCHLORIDE 5 MILLIGRAM(S): 5 TABLET ORAL at 15:05

## 2020-09-01 RX ADMIN — Medication 650 MILLIGRAM(S): at 09:52

## 2020-09-01 RX ADMIN — OXYCODONE HYDROCHLORIDE 5 MILLIGRAM(S): 5 TABLET ORAL at 21:34

## 2020-09-01 NOTE — PHYSICAL THERAPY INITIAL EVALUATION ADULT - PRECAUTIONS/LIMITATIONS, REHAB EVAL
fall precautions/cardiac precautions/No brace at bedside or documented a need for one thus far;FERNANDO Rouse made aware

## 2020-09-01 NOTE — H&P ADULT - NSICDXPASTMEDICALHX_GEN_ALL_CORE_FT
Anne 37 PRE-ADMISSION TESTING GENERAL INSTRUCTIONS- [facility]-phone number:[phone]    GENERAL INSTRUCTIONS  [x] Antibacterial Soap shower Night before and/or AM of Surgery  [] Nick wipe instruction sheet and wipes given. [x] Nothing by mouth after midnight, including gum, candy, mints, or water. [x] You may brush your teeth, gargle, but do NOT swallow water. []Hibiclens shower  the night before and the morning of surgery. Do not use             Hibiclens on your face or head. [x]No smoking, chewing tobacco, illegal drugs, or alcohol within 24 hours of your surgery. [x] Jewelry, valuables or body piercing's should not be brought to the hospital. All body and/or tongue piercing's must be removed prior to arriving to hospital.  ALL hair pins must be removed. [x] Do not wear makeup, lotions, powders, deodorant. Nail polish as directed by the nurse. [x] Arrange transportation with a responsible adult  to and from the hospital. If you do not have a responsible adult  to transport you, you will need to make arrangements with a medical transportation company (i.e. [employer]. A Uber/taxi/bus is not appropriate unless you are accompanied by a responsible adult ). Arrange for someone to be with you for the remainder of the day and for 24 hours after your procedure due to having had anesdaughter___________   Who will be staying with you for 24 hrs after your procedure?__________daughter________  [] Bring insurance card and photo ID.  [] Transfusion Bracelet: Please bring with you to hospital, day of surgery  [] Bring urine specimen day of surgery. Any small container is acceptable. [x] Use inhalers the morning of surgery and bring with you to hospital.  [] Bring copy of living will or healthcare power of  papers to be placed in your electronic record.   [] CPAP/BI-PAP: Please bring your machine if you are to spend the night in the hospital.     PARKING you have concerns about your blood sugar [phone]. [x] Use your inhalers the morning of surgery. Bring your emergency inhaler with you day of surgery. [] Follow physician instructions regarding any blood thinners you may be taking. WHAT TO EXPECT:  [x] The day of surgery you will be greeted and checked in by the [employer].  In addition, you will be registered in the [place] by a Patient Access Representative. Please bring your photo ID and insurance card. A nurse will greet you in accordance to the time you are needed in the pre-op area to prepare you for surgery. Please do not be discouraged if you are not greeted in the order you arrive as there are many variables that are involved in patient preparation. Your patience is greatly appreciated as you wait for your nurse. Please bring in items such as: books, magazines, newspapers, electronics, or any other items  to occupy your time in the waiting area. []  Delays may occur with surgery and staff will make a sincere effort to keep you informed of delays. If any delays occur with your procedure, we apologize ahead of time for your inconvenience as we recognize the value of your time. PAST MEDICAL HISTORY:  Afib     Atrial fibrillation     Degenerative disc disease, cervical     Degenerative disc disease, lumbar     GERD (gastroesophageal reflux disease)     GERD (gastroesophageal reflux disease)     HTN (hypertension)     Hypothyroidism     Hypothyroidism, adult     OA (osteoarthritis)     Sarcoma of omentum

## 2020-09-01 NOTE — H&P ADULT - PROBLEM SELECTOR PLAN 1
appreciate ortho consult, pain control, Oxy IR 5 mg, titrate as needed, PT consult (ASAEL HERNANDEZ), f/u CT LE

## 2020-09-01 NOTE — H&P ADULT - NSHPPHYSICALEXAM_GEN_ALL_CORE
T(C): 36.8 (09-01-20 @ 00:48), Max: 36.8 (09-01-20 @ 00:48)  HR: 84 (09-01-20 @ 00:48) (78 - 88)  BP: 153/69 (09-01-20 @ 00:48) (146/69 - 160/79)  RR: 18 (09-01-20 @ 00:48) (15 - 18)  SpO2: 95% (09-01-20 @ 00:48) (95% - 99%)    Constitutional: NAD, well-developed, well-nourished  Ears, Nose, Mouth, and Throat: normal external ears and nose, normal hearing, dry oral mucosa  Eyes: normal conjunctiva, EOMI, PERRL  Neck: supple, no JVD  Respiratory: Clear to auscultation bilaterally. No wheezes, rales or rhonchi. Normal respiratory effort  Cardiovascular: RRR, no M/R/G, no edema, 2+ Peripheral Pulses  Gastrointestinal: soft, nontender, nondistended, +BS, no hernia  Skin: warm, dry, no rash  Neurologic: sensation grossly intact, CN grossly intact, non-focal exam  Musculoskeletal: no clubbing, no cyanosis, no joint swelling, R leg internally rotated, w/ limited ROM, pain limited  Psychiatric: AOX1, appropriate mood, affect

## 2020-09-01 NOTE — PROGRESS NOTE ADULT - SUBJECTIVE AND OBJECTIVE BOX
Orthopaedic Surgery Progress Note    Subjective:   Patient seen and examined  No acute events overnight  Pain well controlled, currently in no pain lying in bed    Objective:  T(C): 36.7 (09-01-20 @ 04:03), Max: 36.8 (09-01-20 @ 00:48)  HR: 87 (09-01-20 @ 04:03) (78 - 88)  BP: 148/68 (09-01-20 @ 04:03) (146/69 - 160/79)  RR: 18 (09-01-20 @ 04:03) (15 - 18)  SpO2: 98% (09-01-20 @ 04:03) (95% - 99%)  Wt(kg): --      PE    NAD  RLE:   hip held internally rotation, no hip/groin pain on passive rotation  the right knee is stiff and mildly ttp, there are no areas of ecchymosis  patient is diffusely ttp along the entire extremity  motor intact GS/TA/EHL  SILT S/S/SP/DP  WWP                          11.2   8.43  )-----------( 239      ( 01 Sep 2020 00:08 )             33.4     09-01    139  |  101  |  23  ----------------------------<  109<H>  3.9   |  25  |  0.89    Ca    9.2      01 Sep 2020 00:08  Phos  4.0     09-01  Mg     2.3     09-01    TPro  7.1  /  Alb  4.0  /  TBili  0.5  /  DBili  x   /  AST  19  /  ALT  14  /  AlkPhos  81  09-01    < from: CT Pelvis No Cont (08.31.20 @ 21:13) >  EXAM:  CT PELVIS ONLY      EXAM:  CT TIB FIB ONLY RT        PROCEDURE DATE:  Aug 31 2020         INTERPRETATION:  HISTORY: Status post fall with pelvic and knee pain.    Helical CT imaging of the bony pelvis and right tibia and fibula was performedwithout intravenous contrast. Sagittal and coronal reformats were provided. 3-D reformats were performed on a separate workstation.    Correlation is made with radiographs from same day the prior CT of the abdomen and pelvis from June 29th 2019.    Findings:    BONY PELVIS:    The study is limited by diffuse osseous demineralization. Patient is status post right total hip arthroplasty with a noncemented femoral component and an acetabular augmentation screw. Hardware appears intact without evidence of osteolysis or loosening. There is a chronic appearing deformity along the lateral cortex of the right proximal femoral diaphysis which is grossly similar compared to prior CT from June 29, 2019.    There is mild left hip arthrosis. There is mildpubic symphysis arthrosis. There is bilateral sacral iliac joint arthrosis. There is lower lumbar spondylosis.    There is mild soft tissue edema within the subcutaneous fat overlying the lateral aspect of the right hip likely related to soft tissue contusion. No well-defined hematoma is demonstrated. There is fatty atrophy of the right gluteus minimus and medius muscles.    There is a fat-containing umbilicus hernia. There is colonic diverticulosis.    RIGHT TIBIA AND FIBULA:    Study is limitedby osseous demineralization. There is an acute comminuted intra-articular fracture of the medial tibial plateau along its central to posterior aspect. This fracture spans a transverse dimension of approximately 2.7 cm and an anteroposterior dimensionof approximate 2.2 cm. There is extension of the fracture into the medial tibial spine and the posterior central aspect of the tibia. There is no significant depression of the articular surface. Findings are seen in the setting of severe tricompartment arthrosis of the knee. There is a moderate to large joint effusion. Remaining joint spaces are preserved.    There is subcutaneous edema throughout the lower leg extending from the knee to the ankle. Edema is circumferential at the level of the midto distal tibia.    IMPRESSION:    Studies are limited by diffuse osseous mineralization.    PELVIS:    Status post right total hip arthroplasty. Mild soft tissue contusion overlying the lateral soft tissues at the level of the hip. No evidence of acute fracture or dislocation.    RIGHT TIBIA AND FIBULA:    Acute comminuted intra-articular fracture of the medial tibial plateau with extension to the posterior central aspect of the tibia. There is no significant depression of the articular surface.    < end of copied text >        89y Female with recurrent falls onto right side and known right nondisplaced tibial plateau fracture admitted following recent fall from wheelchair with diffuse RLE pain. CT of pel and tib/fib shows intact right hip hardware without e/o loosening or periprosthetic fracture. The tib/fib reveals minimally displaced tibial plateau fracture along medial tibial spine with extension posteriomedially. This correlates with outpatient xrays performed on July 23, 2020 following known fall. There does not appear to be any change in alignment or condylar widening.   - Pain control  - PT/OT/OOB  - RLE bulky andersen dressing applied for comfort  - Patient should remain NWB until follow up appointment  - No acute orthopedic intervention at this time  - Follow up with Dr. Davis as outpatient for repeat xrays and re-evaluation of weight bearing status  - Call ortho with question [id number]    [facility] PGY-5

## 2020-09-01 NOTE — H&P ADULT - NSHPLABSRESULTS_GEN_ALL_CORE
09-01    139  |  101  |  23  ----------------------------<  109<H>  3.9   |  25  |  0.89    Ca    9.2      01 Sep 2020 00:08  Phos  4.0     09-01  Mg     2.3     09-01    TPro  7.1  /  Alb  4.0  /  TBili  0.5  /  DBili  x   /  AST  19  /  ALT  14  /  AlkPhos  81  09-01                        11.2   8.43  )-----------( 239      ( 01 Sep 2020 00:08 )             33.4     LIVER FUNCTIONS - ( 01 Sep 2020 00:08 )  Alb: 4.0 g/dL / Pro: 7.1 g/dL / ALK PHOS: 81 u/L / ALT: 14 u/L / AST: 19 u/L / GGT: x

## 2020-09-01 NOTE — H&P ADULT - ASSESSMENT
Patient is a 89 y/o F PMH HTN, Afib on Xarelto, Hypothyroid, R total hip arthroplasty 2016 p/w mechanical fall , found to have R tibial fx

## 2020-09-01 NOTE — H&P ADULT - NSICDXPASTSURGICALHX_GEN_ALL_CORE_FT
PAST SURGICAL HISTORY:  Abdominal wall mass Exp La, Excison of abdominal mass - 8/2015    After cataract not obscuring vision, bilateral b/l lens implants    S/P oophorectomy     S/P tonsillectomy

## 2020-09-01 NOTE — PHYSICAL THERAPY INITIAL EVALUATION ADULT - PERTINENT HX OF CURRENT PROBLEM, REHAB EVAL
Patient presented after sustaining a fall out of her bed resulting in a Right Tibial plateau fracture

## 2020-09-01 NOTE — PHYSICAL THERAPY INITIAL EVALUATION ADULT - RANGE OF MOTION EXAMINATION, REHAB EVAL
except right leg not assessed due to maintaining extremity in internal rotation/no ROM deficits were identified

## 2020-09-01 NOTE — H&P ADULT - PROBLEM SELECTOR PLAN 4
Avoid anticholinergics, benzodiazepines, limit lines/tubes/tethers/restraints, encourage family presence and frequent reorientation/reassurance by staff, encourage good sleep hygiene, adequate lighting

## 2020-09-01 NOTE — H&P ADULT - NSICDXFAMILYHX_GEN_ALL_CORE_FT
FAMILY HISTORY:  Family history of breast cancer  Family history of early CAD  Family history of emphysema

## 2020-09-01 NOTE — CHART NOTE - NSCHARTNOTEFT_GEN_A_CORE
Spoke to Dr Davis/Dr Carrillo. CT results state that there is an acute intraarticular fracture of medial tibial plateau extending into tibia. After review by attendings, it is believed patient has a chronic fracture which was being treated conservatively. Patient Is s/p total hip arthroplasty - no fractures, found to be hip contusion per attending. Please have patients family bring bladimir from home. Can be unlocked. Patient to be partial weight bearing. Spoke to Dr Davis/Dr Carrillo. CT results state that there is an acute intraarticular fracture of medial tibial plateau extending into tibia. After review by attendings, it is believed patient has a chronic fracture which was being treated conservatively. Patient Is s/p total hip arthroplasty - no fractures, found to be hip contusion per attending. Please have patients family bring bladimir from home. Can be unlocked. Patient to be partial weight bearing in bladimir. Patient seen and examined with Dr Carrillo at bedside. XR and CT / radiological imaging all reviewed by Dr Carrillo. CT results state that there is an acute intraarticular fracture of medial tibial plateau extending into tibia. After review by Dr Carrillo, it is believed patient has a CHRONIC fracture which can be treated conservatively. Patient Is s/p total hip arthroplasty - no fractures, found to have a hip contusion per Dr Carrillo. Please have patients family / son CALEB bring bladimir from home. Can be unlocked. Patient can be weight bearing as tolerated with walker in bladimir. No orthopaedic surgical intervention. Please have patient follow up outpatient with Dr Carrillo in 1-2 weeks. Call office to make an appointment.

## 2020-09-01 NOTE — OCCUPATIONAL THERAPY INITIAL EVALUATION ADULT - PLANNED THERAPY INTERVENTIONS, OT EVAL
ADL retraining/neuromuscular re-education/balance training/motor coordination training/ROM/bed mobility training/strengthening/transfer training

## 2020-09-01 NOTE — H&P ADULT - HISTORY OF PRESENT ILLNESS
*****Patient unable to provide a subjective history due to underlying dementia, history obtained from medical records, ER*****  Patient is a 89 y/o F PMH HTN, Afib on Xarelto, Hypothyroid, R total hip arthroplasty 2016 p/w mechanical fall with inability to ambulate. Reported to ortho that she was trying to get out of bed, fell onto right hip.

## 2020-09-01 NOTE — PHARMACOTHERAPY INTERVENTION NOTE - COMMENTS
Medication history is complete. Medication list updated in Outpatient Medication Record (OMR). Please call spectra [phone] if you have any questions.

## 2020-09-01 NOTE — OCCUPATIONAL THERAPY INITIAL EVALUATION ADULT - PERTINENT HX OF CURRENT PROBLEM, REHAB EVAL
Patient is a 89 y/o F PMH HTN, Afib on Xarelto, Hypothyroid, Right total hip arthroplasty 2016 p/w mechanical fall , found to have Right tibial fx .

## 2020-09-02 ENCOUNTER — TRANSCRIPTION ENCOUNTER (OUTPATIENT)
Age: 86
End: 2020-09-02

## 2020-09-02 VITALS
TEMPERATURE: 98 F | RESPIRATION RATE: 18 BRPM | SYSTOLIC BLOOD PRESSURE: 143 MMHG | OXYGEN SATURATION: 95 % | HEART RATE: 92 BPM | DIASTOLIC BLOOD PRESSURE: 66 MMHG

## 2020-09-02 LAB
APPEARANCE UR: SIGNIFICANT CHANGE UP
BACTERIA # UR AUTO: HIGH
BILIRUB UR-MCNC: NEGATIVE — SIGNIFICANT CHANGE UP
BLOOD UR QL VISUAL: HIGH
COLOR SPEC: YELLOW — SIGNIFICANT CHANGE UP
GLUCOSE UR-MCNC: NEGATIVE — SIGNIFICANT CHANGE UP
HCT VFR BLD CALC: 36 % — SIGNIFICANT CHANGE UP (ref 34.5–45)
HGB BLD-MCNC: 11.4 G/DL — LOW (ref 11.5–15.5)
HYALINE CASTS # UR AUTO: NEGATIVE — SIGNIFICANT CHANGE UP
KETONES UR-MCNC: SIGNIFICANT CHANGE UP
LEUKOCYTE ESTERASE UR-ACNC: SIGNIFICANT CHANGE UP
MCHC RBC-ENTMCNC: 27.5 PG — SIGNIFICANT CHANGE UP (ref 27–34)
MCHC RBC-ENTMCNC: 31.7 % — LOW (ref 32–36)
MCV RBC AUTO: 87 FL — SIGNIFICANT CHANGE UP (ref 80–100)
NITRITE UR-MCNC: POSITIVE — HIGH
NRBC # FLD: 0 K/UL — SIGNIFICANT CHANGE UP (ref 0–0)
PH UR: 6 — SIGNIFICANT CHANGE UP (ref 5–8)
PLATELET # BLD AUTO: 228 K/UL — SIGNIFICANT CHANGE UP (ref 150–400)
PMV BLD: 11 FL — SIGNIFICANT CHANGE UP (ref 7–13)
PROT UR-MCNC: 20 — SIGNIFICANT CHANGE UP
RBC # BLD: 4.14 M/UL — SIGNIFICANT CHANGE UP (ref 3.8–5.2)
RBC # FLD: 15.2 % — HIGH (ref 10.3–14.5)
RBC CASTS # UR COMP ASSIST: HIGH (ref 0–?)
SP GR SPEC: 1.03 — SIGNIFICANT CHANGE UP (ref 1–1.04)
SQUAMOUS # UR AUTO: SIGNIFICANT CHANGE UP
UROBILINOGEN FLD QL: NORMAL — SIGNIFICANT CHANGE UP
WBC # BLD: 8.34 K/UL — SIGNIFICANT CHANGE UP (ref 3.8–10.5)
WBC # FLD AUTO: 8.34 K/UL — SIGNIFICANT CHANGE UP (ref 3.8–10.5)
WBC UR QL: SIGNIFICANT CHANGE UP (ref 0–?)

## 2020-09-02 RX ORDER — LEVOTHYROXINE SODIUM 125 MCG
75 TABLET ORAL DAILY
Refills: 0 | Status: DISCONTINUED | OUTPATIENT
Start: 2020-09-02 | End: 2020-09-02

## 2020-09-02 RX ORDER — ACETAMINOPHEN 500 MG
2 TABLET ORAL
Qty: 0 | Refills: 0 | DISCHARGE
Start: 2020-09-02

## 2020-09-02 RX ORDER — FUROSEMIDE 40 MG
20 TABLET ORAL DAILY
Refills: 0 | Status: DISCONTINUED | OUTPATIENT
Start: 2020-09-02 | End: 2020-09-02

## 2020-09-02 RX ORDER — ACETAMINOPHEN 500 MG
2 TABLET ORAL
Qty: 0 | Refills: 0 | DISCHARGE

## 2020-09-02 RX ORDER — SERTRALINE 25 MG/1
100 TABLET, FILM COATED ORAL DAILY
Refills: 0 | Status: DISCONTINUED | OUTPATIENT
Start: 2020-09-02 | End: 2020-09-02

## 2020-09-02 RX ORDER — DILTIAZEM HCL 120 MG
300 CAPSULE, EXT RELEASE 24 HR ORAL DAILY
Refills: 0 | Status: DISCONTINUED | OUTPATIENT
Start: 2020-09-02 | End: 2020-09-02

## 2020-09-02 RX ADMIN — Medication 75 MICROGRAM(S): at 05:08

## 2020-09-02 RX ADMIN — SERTRALINE 100 MILLIGRAM(S): 25 TABLET, FILM COATED ORAL at 11:49

## 2020-09-02 RX ADMIN — OXYCODONE HYDROCHLORIDE 5 MILLIGRAM(S): 5 TABLET ORAL at 06:29

## 2020-09-02 RX ADMIN — Medication 300 MILLIGRAM(S): at 06:29

## 2020-09-02 RX ADMIN — Medication 20 MILLIGRAM(S): at 05:08

## 2020-09-02 RX ADMIN — OXYCODONE HYDROCHLORIDE 5 MILLIGRAM(S): 5 TABLET ORAL at 11:49

## 2020-09-02 RX ADMIN — OXYCODONE HYDROCHLORIDE 5 MILLIGRAM(S): 5 TABLET ORAL at 00:42

## 2020-09-02 RX ADMIN — RIVAROXABAN 15 MILLIGRAM(S): KIT at 11:19

## 2020-09-02 RX ADMIN — OXYCODONE HYDROCHLORIDE 5 MILLIGRAM(S): 5 TABLET ORAL at 06:30

## 2020-09-02 RX ADMIN — OXYCODONE HYDROCHLORIDE 5 MILLIGRAM(S): 5 TABLET ORAL at 11:19

## 2020-09-02 NOTE — DISCHARGE NOTE PROVIDER - CARE PROVIDER_API CALL
Ramiro Davis  ORTHOPEDICS  [address] [place] [address], [address]  [place] [address]  Phone: [phone]  Fax: [phone]  Follow Up Time:

## 2020-09-02 NOTE — DISCHARGE NOTE PROVIDER - HOSPITAL COURSE
89 F PMH HTN, Afib on Xarelto, Hypothyroid, R total hip arthroplasty 2016 p/w mechanical fall , found to have R tibial fx              Hospital Course:        Fall, initial encounter    appreciate ortho consult, pain control, Oxy IR 5 mg, titrate as needed, PT consult (RLE NWB), f/u CT LE        Paroxysmal atrial fibrillation    c/w xarelto        Essential hypertension    clarify home meds (emailed pharmacy)        Late onset Alzheimer's disease without behavioral disturbance    Avoid anticholinergics, benzodiazepines, limit lines/tubes/tethers/restraints, encourage family presence and frequent reorientation/reassurance by staff, encourage good sleep hygiene, adequate lighting            Dispo-  stble for discharge to rehab as per attending on 9/2/20.  All medications reviewed prior to discharge. 89 F PMH HTN, Afib on Xarelto, Hypothyroid, R total hip arthroplasty 2016 p/w mechanical fall , found to have R tibial fx              Hospital Course:        Fall, initial encounter    appreciate ortho consult, pain control, Oxy IR 5 mg, titrate as needed, PT consult (RLCARYN NWJAMES), f/u CT LE        Paroxysmal atrial fibrillation    c/w xarelto        Essential hypertension    clarify home meds (emailed pharmacy)        Late onset Alzheimer's disease without behavioral disturbance    Avoid anticholinergics, benzodiazepines, limit lines/tubes/tethers/restraints, encourage family presence and frequent reorientation/reassurance by staff, encourage good sleep hygiene, adequate lighting            Dispo-  stble for discharge to rehab as per attending on 9/2/20.  All medications reviewed prior to discharge.          Attending Addendum:    Patient seen and examined by me on the discharge day. ortho f/u before dc planning. will need outpt ortho follow up.     All questions answered in details. Follow up plan explained.     More than 30 mins were spent evaluating patient and coordinating care for discharge. Plan for rehab.     Discharge summary sent to pt's primary care physician at [facility].  d/w NP Rosana.

## 2020-09-02 NOTE — CHART NOTE - NSCHARTNOTEFT_GEN_A_CORE
Bulky andersen dressing removed and patient placed in her Ravenel brace which family brought in from home.    Continue NWB RLE

## 2020-09-02 NOTE — DISCHARGE NOTE PROVIDER - NSDCFUSCHEDAPPT_GEN_ALL_CORE_FT
TYRON FORTUNE ; 09/03/2020 ; [facility] [place] [facility] TYRON FORTUNE ; 09/03/2020 ; [facility] [address] [facility] TYRON FORTUNE ; 09/03/2020 ; [facility]

## 2020-09-02 NOTE — DISCHARGE NOTE PROVIDER - NSDCCPCAREPLAN_GEN_ALL_CORE_FT
PRINCIPAL DISCHARGE DIAGNOSIS  Diagnosis: Fall  Assessment and Plan of Treatment: PELVIS:  Status post right total hip arthroplasty. Mild soft tissue contusion overlying the lateral soft tissues at the level of the hip. No evidence of acute fracture or dislocation.  RIGHT TIBIA AND FIBULA:  Acute comminuted intra-articular fracture of the medial tibial plateau with extension to the posterior central aspect of the tibia. There is no significant depression of the articular surface.  You will follow up with Orthopedic Dr. Davis within 2 weeks after discharge from       SECONDARY DISCHARGE DIAGNOSES  Diagnosis: Late onset Alzheimer's disease without behavioral disturbance  Assessment and Plan of Treatment: Please continue your medications as prescribed and allow help with daily acitivities of living. Maintain a safe environment and make movements in a careful manner to prevent falls. Ensure that you are eating and drinking adequately and maintaining a healthy sleep cycle. If you are in need of assistance with medication adjustment you can follow-up with your outpatient provider or refer to the [facility] clinic by calling [phone].    Diagnosis: Paroxysmal atrial fibrillation  Assessment and Plan of Treatment: Continue Zarelto as prescribed.

## 2020-09-02 NOTE — DISCHARGE NOTE NURSING/CASE MANAGEMENT/SOCIAL WORK - PATIENT PORTAL LINK FT
You can access the FollowMyHealth Patient Portal offered by [facility] by registering at the following website: [email or web address]. By joining [employer]’s FollowMyHealth portal, you will also be able to view your health information using other applications (apps) compatible with our system.

## 2020-09-02 NOTE — DISCHARGE NOTE PROVIDER - NSDCMRMEDTOKEN_GEN_ALL_CORE_FT
acetaminophen 500 mg oral tablet: 2 tab(s) orally 3 times a day, As Needed  cetirizine 10 mg oral tablet: 1 tab(s) orally once a day, As Needed  dilTIAZem 300 mg/24 hours oral capsule, extended release: 1 cap(s) orally once a day  docusate sodium 100 mg oral capsule: 1 cap(s) orally once a day (at bedtime)  Flonase 50 mcg/inh nasal spray: 1 spray(s) in each nostril 2 times a day, As Needed  furosemide 20 mg oral tablet: 1 tab(s) orally once a day  levothyroxine 75 mcg (0.075 mg) oral tablet: 1 tab(s) orally once a day  pantoprazole 40 mg oral delayed release tablet: 1 tab(s) orally once a day  sertraline 100 mg oral tablet: 1 tab(s) orally once a day  Xarelto 15 mg oral tablet: 1 tab(s) orally once a day (in the evening) acetaminophen 325 mg oral tablet: 2 tab(s) orally every 6 hours, As needed, Mild Pain (1 - 3)  cetirizine 10 mg oral tablet: 1 tab(s) orally once a day, As Needed  dilTIAZem 300 mg/24 hours oral capsule, extended release: 1 cap(s) orally once a day  docusate sodium 100 mg oral capsule: 1 cap(s) orally once a day (at bedtime)  Flonase 50 mcg/inh nasal spray: 1 spray(s) in each nostril 2 times a day, As Needed  furosemide 20 mg oral tablet: 1 tab(s) orally once a day  levothyroxine 75 mcg (0.075 mg) oral tablet: 1 tab(s) orally once a day  pantoprazole 40 mg oral delayed release tablet: 1 tab(s) orally once a day  sertraline 100 mg oral tablet: 1 tab(s) orally once a day  Xarelto 15 mg oral tablet: 1 tab(s) orally once a day (in the evening)

## 2020-09-03 ENCOUNTER — APPOINTMENT (OUTPATIENT)
Dept: ORTHOPEDIC SURGERY | Facility: CLINIC | Age: 86
End: 2020-09-03

## 2020-09-11 ENCOUNTER — APPOINTMENT (OUTPATIENT)
Dept: ORTHOPEDIC SURGERY | Facility: CLINIC | Age: 86
End: 2020-09-11
Payer: MEDICARE

## 2020-09-11 DIAGNOSIS — M17.11 UNILATERAL PRIMARY OSTEOARTHRITIS, RIGHT KNEE: ICD-10-CM

## 2020-09-11 DIAGNOSIS — S82.141A DISPLACED BICONDYLAR FRACTURE OF RIGHT TIBIA, INITIAL ENCOUNTER FOR CLOSED FRACTURE: ICD-10-CM

## 2020-09-11 PROCEDURE — 99213 OFFICE O/P EST LOW 20 MIN: CPT

## 2020-09-11 PROCEDURE — 73562 X-RAY EXAM OF KNEE 3: CPT | Mod: 26,RT

## 2020-09-11 PROCEDURE — 73502 X-RAY EXAM HIP UNI 2-3 VIEWS: CPT | Mod: TC,RT

## 2020-09-22 ENCOUNTER — APPOINTMENT (OUTPATIENT)
Dept: ORTHOPEDIC SURGERY | Facility: CLINIC | Age: 86
End: 2020-09-22
Payer: MEDICARE

## 2020-09-22 VITALS
HEART RATE: 68 BPM | HEIGHT: 65 IN | DIASTOLIC BLOOD PRESSURE: 65 MMHG | OXYGEN SATURATION: 94 % | SYSTOLIC BLOOD PRESSURE: 120 MMHG

## 2020-09-22 PROCEDURE — 99213 OFFICE O/P EST LOW 20 MIN: CPT

## 2020-09-23 NOTE — DISCUSSION/SUMMARY
[de-identified] : Right knee tibial plateau fracture, with significant physical deconditioning, with history of right total hip replacement, currently admitted to inpatient rehab. \par A long discussion was had with the patient regarding her condition. She was told that although her pain is improving, she remains to be deconditioned overall, at a significant fall risk. She has been advised that she has been cleared for ambulation with a walker while wearing the brace, with 1-2 person assistance. I have advised that she is not a candidate at this time to return home without 24 hour care, unless deemed cleared by the PT and medical doctor currently caring for her in [facility]. She has been recommended to continue with inpatient rehab for conditioning and physical therapy, until discharged.  We had a lengthy discussion regarding her expectations, and the patient along with her family expressed understanding. She does not need weekly follow up with me, and she has been recommended to return to the office once she is discharged from inpatient rehab, or in 6-8 weeks.

## 2020-09-23 NOTE — PHYSICAL EXAM
[de-identified] : On general examination the patient is adequately groomed and nourished. The vital parameters are as recorded. The patient is deconditioned. \par There is diffuse swelling bilateral LE with varicose veins, no skin warmth/erythema/scars/swelling, no ulcers and no palpable lymph nodes or masses in both lower extremities. Bilateral pedal pulses are well palpable.\par Upper Extremity:\par Both right and left upper extremities are unremarkable in terms of skin rash, lesions, pigmentation, redness, tenderness, swelling, joint instability, abnormal deformity or crepitus. The overall range of motion, sensation, motor tone and strength testing are normal.\par \par The patient is examined while in a wheelchair today. \par Gait was not observed today. \par Both knees demonstrate no scars and the skin has no warmth, erythema, swelling or tenderness. \par Both knees have a range of motion of\par Extension:                    Right -5 degrees                        Left 0 degrees\par Flexion:                                   Right 95 degrees          Left 115 degrees\par Right Knee: There is medial joint line tenderness, which is improved compared to previous examinations. There is mild swelling of the knee. \par Lachman's test, Anterior/Posterior Drawer test and Pivot Shift Tests are negative. \par There is right knee grade 1 MCL mediolateral laxity and no anteroposterior instability. \par Patella compression test is negative and patellofemoral tracking is normal with no lateral subluxation, apprehension or instability. \par Right knee quadriceps and hamstrings power is 4.\par Left knee quadriceps and hamstrings power is 4\par \par right hip:  There is a well healed scar of surgery with no significant swelling, redness, or tenderness. Range of motion of the hip: active SLR of 30 degrees and hip flexion to 60 degrees Abduction 30 degrees adduction 15 degrees external rotation 30 degrees internal rotation 15 degrees with hip abductor extensor power grade. The patient exhibits weak abductors, right LE held in IR. \par EHL/FHL/GS/TA 5/5. S/S/SP/DP/T SILT\par  [de-identified] : no new imaging taken today.

## 2020-09-23 NOTE — HISTORY OF PRESENT ILLNESS
[Improving] : improving [de-identified] : Ms. TYRON FORTUNE is a 89 year old female presents with family members for another evaluation. Patient is still admitted to [facility]. She presents as she is wondering if she can ambulate on her own, as she would like to be discharged from rehab. Since her last visit, she has been receiving PT 5 x week, and ambulating with a walker with assistance. She notes she would like to leave the rehab, and presents with questions about how this is done.\par She notes her pain to the knee has decreased. She is wearing the wrap around brace.

## 2020-10-16 PROBLEM — S82.141A CLOSED FRACTURE OF RIGHT TIBIAL PLATEAU, INITIAL ENCOUNTER: Status: ACTIVE | Noted: 2020-07-23

## 2020-10-16 NOTE — HISTORY OF PRESENT ILLNESS
[de-identified] : Ms. TYRON FORTUNE is a 89 year old female presents with family members for an evaluation of her right knee. She was last seen by me in July, and was found to have a right knee tibial plateau fracture, which was treated conservatively at that time. She was then seen by Dr. Davis on august 31 following a fall, and recommended for evaluation in the ER due to right hip questionable fracture and right knee worsened pain. She was admitted to [facility], and found to have a right knee tibial plateau fracture. Patient is still admitted to [facility] for rehab and conditioning. She presents with her family today for evaluation of her knee. \par She notes her pain to the knee has decreased. She is wearing the wrap around brace.  [Worsening] : worsening [8] : a current pain level of 8/10

## 2020-10-16 NOTE — PHYSICAL EXAM
[de-identified] : On general examination the patient is adequately groomed and nourished. The vital parameters are as recorded. The patient is deconditioned. \par There is diffuse swelling bilateral LE with varicose veins, no skin warmth/erythema/scars/swelling, no ulcers and no palpable lymph nodes or masses in both lower extremities. Bilateral pedal pulses are well palpable.\par Upper Extremity:\par Both right and left upper extremities are unremarkable in terms of skin rash, lesions, pigmentation, redness, tenderness, swelling, joint instability, abnormal deformity or crepitus. The overall range of motion, sensation, motor tone and strength testing are normal.\par \par The patient is examined while in a wheelchair today. \par Gait was not observed today. \par Both knees demonstrate no scars and the skin has no warmth, erythema, swelling or tenderness. \par Both knees have a range of motion of\par Extension:                    Right -5 degrees                        Left 0 degrees\par Flexion:                                   Right 95 degrees          Left 115 degrees\par Right Knee: There is medial joint line tenderness, which is improved compared to previous examinations. There is mild swelling of the knee. \par Lachman's test, Anterior/Posterior Drawer test and Pivot Shift Tests are negative. \par There is right knee grade 1 MCL mediolateral laxity and no anteroposterior instability. \par Patella compression test is negative and patellofemoral tracking is normal with no lateral subluxation, apprehension or instability. \par Right knee quadriceps and hamstrings power is 4.\par Left knee quadriceps and hamstrings power is 4\par \par right hip:  There is a well healed scar of surgery with no significant swelling, redness, or tenderness. Range of motion of the hip: active SLR of 30 degrees and hip flexion to 60 degrees Abduction 30 degrees adduction 15 degrees external rotation 30 degrees internal rotation 15 degrees with hip abductor extensor power grade. The patient exhibits weak abductors, right LE held in IR. \par EHL/FHL/GS/TA 5/5. S/S/SP/DP/T SILT\par  [de-identified] : The following radiographs were ordered and read by me during this patients visit. I reviewed each radiograph in detail with the patient and discussed the findings as highlighted below. \par AP, lateral and skyline views of the right knee taken today reveal advanced degenerative joint disease with lateral joint line narrowing and valgus deformity, with a closed fracture tibial plateau with no further displacement.\par AP view of the pelvis, AP and false profile views of the right hip reveal a stable right total hip replacement. There is internal rotation of the right hip. \par

## 2020-10-16 NOTE — DISCUSSION/SUMMARY
[de-identified] : Right knee DJD with medial tibial plateau fracture, s/p right total hip replacement, physical deconditioning. \par At this time repeat xrays reveal no further displacement of the right knee fracture, with severe DJD, There is internal rotation of the right hip, due to muscle weakness. The patients right hip replacement is within normal limits with no evidence of loosening or periprosthetic fracture. \par She has been recommended continued physical therapy for conditioning. She will need assistance for any ambulation, as she is unable to ambulate with a walker at this time on her own. She has been made WBAT, and will begin conditioning while in rehab. She has been recommended follow up once she has been discharged from [place]. \par

## 2020-11-23 ENCOUNTER — APPOINTMENT (OUTPATIENT)
Dept: ORTHOPEDIC SURGERY | Facility: CLINIC | Age: 86
End: 2020-11-23
Payer: MEDICARE

## 2020-11-23 VITALS
DIASTOLIC BLOOD PRESSURE: 68 MMHG | HEART RATE: 73 BPM | HEIGHT: 65 IN | SYSTOLIC BLOOD PRESSURE: 120 MMHG | WEIGHT: 165 LBS | BODY MASS INDEX: 27.49 KG/M2 | OXYGEN SATURATION: 93 %

## 2020-11-23 DIAGNOSIS — M17.31 UNILATERAL POST-TRAUMATIC OSTEOARTHRITIS, RIGHT KNEE: ICD-10-CM

## 2020-11-23 PROCEDURE — 73562 X-RAY EXAM OF KNEE 3: CPT | Mod: RT

## 2020-11-23 PROCEDURE — 99213 OFFICE O/P EST LOW 20 MIN: CPT

## 2020-11-23 NOTE — DISCUSSION/SUMMARY
[de-identified] : This is a 89-year-old female with a healed right tibial plateau fracture but has developed posttraumatic arthritic change, primarily in the lateral compartment.  Overall she is feeling much better today and has improved ambulation with a walker.  I recommended continued conservative management with physical therapy and pain medication on a as needed basis.  She has no weightbearing precautions.  She may follow-up on a as needed basis.  If she continues to have pain we may consider a steroid injection at her next visit.

## 2020-11-23 NOTE — DATA REVIEWED
[de-identified] : 11/23/2020–right knee x-rays (AP, lateral, oblique): There is progression of lateral joint space narrowing with medial joint space widening, along with arthritic changes including osteophyte formation and subchondral sclerosis.

## 2020-11-23 NOTE — HISTORY OF PRESENT ILLNESS
[FreeTextEntry1] : This is a 89-year-old female with a history of A. fib on Xarelto, right tibial plateau fracture treated nonoperatively (7/23/2020), status post right anterior total hip arthroplasty (8/2016, Dr. Carrillo) who returns for FU.  During this time she was transitioned to full weightbearing, most recently seen on 9/22/2020.  The patient reports improvements in pain and ambulation.  She currently walks with a walker.  She is planning on traveling to [place] and would like to make sure everything is okay.

## 2020-11-23 NOTE — PHYSICAL EXAM
[FreeTextEntry1] : General: Patient alert and oriented to person, place and time. \par Psychiatric: normal mood and affect, no abnormal movements or speech patterns.\par Eyes: vision intact without deficits, sclera and conjunctiva were normal, pupils were equal in size. \par ENT: Ears and nose were normal in appearance. No thyromegaly.\par Lymph: no enlarged nodes, no lymphedema in extremity.\par Respiratory: Normal respiratory rhythm and effort. No wheezing detected without auscultation. No shortness of breath or respiratory distress.\par Cardiac: no cardiac related leg swelling, 2+ peripheral pulses.\par Neurology: normal gross sensation in extremities to light touch.\par Abdomen: soft, non-tender, tympanic, no masses.\par \par RLE:\par \par Anterior incisional scar well healed. RLE swelling resolved. No ecchymosis. \par +TTP over lateral plateau. Slight valgus instability ~5 degrees. No TTP over quadriceps/patellar tendon. No TTP over tibial tubercle or pes insertion. \par EHL/FHL/GS/TA 5/5. S/S/SP/DP/T SILT. Toes warm, BCR. Compartments soft.\par Equal limb lengths.\par \par

## 2021-05-03 NOTE — ED ADULT TRIAGE NOTE - AS TEMP SITE
oral Dermal Autograft Text: The defect edges were debeveled with a #15 scalpel blade.  Given the location of the defect, shape of the defect and the proximity to free margins a dermal autograft was deemed most appropriate.  Using a sterile surgical marker, the primary defect shape was transferred to the donor site. The area thus outlined was incised deep to adipose tissue with a #15 scalpel blade.  The harvested graft was then trimmed of adipose and epidermal tissue until only dermis was left.  The skin graft was then placed in the primary defect and oriented appropriately.

## 2021-09-14 ENCOUNTER — APPOINTMENT (OUTPATIENT)
Dept: ORTHOPEDIC SURGERY | Facility: CLINIC | Age: 87
End: 2021-09-14
Payer: MEDICARE

## 2021-09-14 DIAGNOSIS — M70.61 TROCHANTERIC BURSITIS, RIGHT HIP: ICD-10-CM

## 2021-09-14 PROCEDURE — 99213 OFFICE O/P EST LOW 20 MIN: CPT

## 2021-09-14 PROCEDURE — 73502 X-RAY EXAM HIP UNI 2-3 VIEWS: CPT

## 2021-09-14 NOTE — DATA REVIEWED
[de-identified] : 9/14/2021–right hip x-rays (AP, lateral): Well-positioned hip arthroplasty components without change from prior. No periprosthetic fractures or dislocations.

## 2021-09-14 NOTE — DISCUSSION/SUMMARY
[de-identified] : This is a 89-year-old female with right greater trochanteric bursitis. The treatment is conservative, with anti-inflammatory medication. She was told to check with her cardiologist if she is allowed to take meloxicam. If so I will send in the prescription. May otherwise use Tylenol as needed. Otherwise maintain weightbearing as tolerated with anterior precautions. Follow-up as needed.

## 2021-09-14 NOTE — PHYSICAL EXAM
[FreeTextEntry1] : General: well nourished, in no acute distress, alert and oriented to person, place and time.\par Psychiatric: normal mood and affect, no abnormal movements or speech patterns.\par Eyes: vision intact without deficits, sclera and conjunctiva were normal, pupils were equal in size. \par ENT: Ears and nose were normal in appearance. No thyromegaly.\par Lymph: no enlarged nodes, no lymphedema in extremity.\par Respiratory: Normal respiratory rhythm and effort. No wheezing detected without auscultation. No shortness of breath or respiratory distress.\par Cardiac: no cardiac related leg swelling.\par Neurology: normal gross sensation in extremities to light touch.\par Abdomen: soft, non-tender, tympanic, no masses.\par \par RLE:\par \par Anterior incisional scar well-healed. No erythema or ecchymosis. \par No instability. +TTP over GT. No palpable masses. No lymphedema.\par Negative LR, HS, SLR. \par ROM: FF: 80. IR: 5. ER: 30. \par EHL/FHL/GS/TA 5/5. S/S/SP/DP/T SILT. Toes warm, BCR. Compartments soft.\par \par Gait: Patient ambulates well using the walker without visible pain.

## 2021-09-14 NOTE — HISTORY OF PRESENT ILLNESS
[FreeTextEntry1] : This is a 89-year-old female with a history of A. fib (Xarelto dc'd 2 wks ago), right tibial plateau fracture treated nonoperatively (7/23/2020), status post right anterior total hip arthroplasty (8/2016, Dr. Carrillo) who returns for FU d/t 1 day of R hip pain. Denies any trauma. Has no pain at rest and up to 5 out of 10 pain with walking, uses a walker at baseline. The pain is localized to the lateral aspect of her right hip and radiates down her thigh. Has not tried any pain medication.

## 2022-04-04 ENCOUNTER — EMERGENCY (EMERGENCY)
Facility: HOSPITAL | Age: 88
LOS: 1 days | Discharge: HOME | End: 2022-04-04
Attending: EMERGENCY MEDICINE | Admitting: EMERGENCY MEDICINE
Payer: MEDICARE

## 2022-04-04 VITALS
HEART RATE: 72 BPM | HEIGHT: 65 IN | WEIGHT: 169.98 LBS | OXYGEN SATURATION: 99 % | RESPIRATION RATE: 16 BRPM | DIASTOLIC BLOOD PRESSURE: 66 MMHG | SYSTOLIC BLOOD PRESSURE: 121 MMHG | TEMPERATURE: 98 F

## 2022-04-04 DIAGNOSIS — R19.00 INTRA-ABDOMINAL AND PELVIC SWELLING, MASS AND LUMP, UNSPECIFIED SITE: Chronic | ICD-10-CM

## 2022-04-04 DIAGNOSIS — Z90.721 ACQUIRED ABSENCE OF OVARIES, UNILATERAL: Chronic | ICD-10-CM

## 2022-04-04 DIAGNOSIS — H26.493 OTHER SECONDARY CATARACT, BILATERAL: Chronic | ICD-10-CM

## 2022-04-04 DIAGNOSIS — Z98.89 OTHER SPECIFIED POSTPROCEDURAL STATES: Chronic | ICD-10-CM

## 2022-04-04 LAB
BASOPHILS # BLD AUTO: 0.01 K/UL — SIGNIFICANT CHANGE UP (ref 0–0.2)
BASOPHILS NFR BLD AUTO: 0.2 % — SIGNIFICANT CHANGE UP (ref 0–2)
EOSINOPHIL # BLD AUTO: 0.16 K/UL — SIGNIFICANT CHANGE UP (ref 0–0.5)
EOSINOPHIL NFR BLD AUTO: 2.8 % — SIGNIFICANT CHANGE UP (ref 0–6)
HCT VFR BLD CALC: 34.4 % — LOW (ref 34.5–45)
HGB BLD-MCNC: 11.6 G/DL — SIGNIFICANT CHANGE UP (ref 11.5–15.5)
IMM GRANULOCYTES NFR BLD AUTO: 0.4 % — SIGNIFICANT CHANGE UP (ref 0–1.5)
LYMPHOCYTES # BLD AUTO: 1.37 K/UL — SIGNIFICANT CHANGE UP (ref 1–3.3)
LYMPHOCYTES # BLD AUTO: 24.1 % — SIGNIFICANT CHANGE UP (ref 13–44)
MCHC RBC-ENTMCNC: 29.4 PG — SIGNIFICANT CHANGE UP (ref 27–34)
MCHC RBC-ENTMCNC: 33.7 GM/DL — SIGNIFICANT CHANGE UP (ref 32–36)
MCV RBC AUTO: 87.1 FL — SIGNIFICANT CHANGE UP (ref 80–100)
MONOCYTES # BLD AUTO: 0.41 K/UL — SIGNIFICANT CHANGE UP (ref 0–0.9)
MONOCYTES NFR BLD AUTO: 7.2 % — SIGNIFICANT CHANGE UP (ref 2–14)
NEUTROPHILS # BLD AUTO: 3.72 K/UL — SIGNIFICANT CHANGE UP (ref 1.8–7.4)
NEUTROPHILS NFR BLD AUTO: 65.3 % — SIGNIFICANT CHANGE UP (ref 43–77)
NRBC # BLD: 0 /100 WBCS — SIGNIFICANT CHANGE UP (ref 0–0)
PLATELET # BLD AUTO: 234 K/UL — SIGNIFICANT CHANGE UP (ref 150–400)
RBC # BLD: 3.95 M/UL — SIGNIFICANT CHANGE UP (ref 3.8–5.2)
RBC # FLD: 15.9 % — HIGH (ref 10.3–14.5)
WBC # BLD: 5.69 K/UL — SIGNIFICANT CHANGE UP (ref 3.8–10.5)
WBC # FLD AUTO: 5.69 K/UL — SIGNIFICANT CHANGE UP (ref 3.8–10.5)

## 2022-04-04 PROCEDURE — 71045 X-RAY EXAM CHEST 1 VIEW: CPT | Mod: 26

## 2022-04-04 PROCEDURE — 99284 EMERGENCY DEPT VISIT MOD MDM: CPT

## 2022-04-04 RX ORDER — SODIUM CHLORIDE 9 MG/ML
1000 INJECTION INTRAMUSCULAR; INTRAVENOUS; SUBCUTANEOUS ONCE
Refills: 0 | Status: COMPLETED | OUTPATIENT
Start: 2022-04-04 | End: 2022-04-04

## 2022-04-04 RX ADMIN — SODIUM CHLORIDE 1000 MILLILITER(S): 9 INJECTION INTRAMUSCULAR; INTRAVENOUS; SUBCUTANEOUS at 23:48

## 2022-04-04 NOTE — ED ADULT NURSE NOTE - NSIMPLEMENTINTERV_GEN_ALL_ED
Implemented All Universal Safety Interventions:  [place] to call system. Call bell, personal items and telephone within reach. Instruct patient to call for assistance. Room bathroom lighting operational. Non-slip footwear when patient is off stretcher. Physically safe environment: no spills, clutter or unnecessary equipment. Stretcher in lowest position, wheels locked, appropriate side rails in place.

## 2022-04-04 NOTE — ED PROVIDER NOTE - OBJECTIVE STATEMENT
89yo female who presents with not feeling well today, son states pt has been having abd pain but pt denies, she denies any complaints specifically just states she does not feel well no vomiting or diarrhea, no cough or sob

## 2022-04-04 NOTE — ED PROVIDER NOTE - PATIENT PORTAL LINK FT
You can access the FollowMyHealth Patient Portal offered by [facility] by registering at the following website: [email or web address]. By joining [employer]’s FollowMyHealth portal, you will also be able to view your health information using other applications (apps) compatible with our system.

## 2022-04-04 NOTE — ED PROVIDER NOTE - NSICDXPASTMEDICALHX_GEN_ALL_CORE_FT
PAST MEDICAL HISTORY:  Afib     Atrial fibrillation     Degenerative disc disease, cervical     Degenerative disc disease, lumbar     GERD (gastroesophageal reflux disease)     GERD (gastroesophageal reflux disease)     HTN (hypertension)     Hypothyroidism     Hypothyroidism, adult     OA (osteoarthritis)     Sarcoma of omentum

## 2022-04-05 VITALS
OXYGEN SATURATION: 98 % | DIASTOLIC BLOOD PRESSURE: 77 MMHG | RESPIRATION RATE: 16 BRPM | SYSTOLIC BLOOD PRESSURE: 119 MMHG | HEART RATE: 70 BPM

## 2022-04-05 LAB
ALBUMIN SERPL ELPH-MCNC: 3.8 G/DL — SIGNIFICANT CHANGE UP (ref 3.3–5)
ALP SERPL-CCNC: 63 U/L — SIGNIFICANT CHANGE UP (ref 40–120)
ALT FLD-CCNC: 21 U/L — SIGNIFICANT CHANGE UP (ref 12–78)
ANION GAP SERPL CALC-SCNC: 10 MMOL/L — SIGNIFICANT CHANGE UP (ref 5–17)
APPEARANCE UR: CLEAR — SIGNIFICANT CHANGE UP
AST SERPL-CCNC: 23 U/L — SIGNIFICANT CHANGE UP (ref 15–37)
BACTERIA # UR AUTO: ABNORMAL
BILIRUB SERPL-MCNC: 0.5 MG/DL — SIGNIFICANT CHANGE UP (ref 0.2–1.2)
BILIRUB UR-MCNC: NEGATIVE — SIGNIFICANT CHANGE UP
BUN SERPL-MCNC: 24 MG/DL — HIGH (ref 7–23)
CALCIUM SERPL-MCNC: 8.9 MG/DL — SIGNIFICANT CHANGE UP (ref 8.5–10.1)
CHLORIDE SERPL-SCNC: 106 MMOL/L — SIGNIFICANT CHANGE UP (ref 96–108)
CO2 SERPL-SCNC: 23 MMOL/L — SIGNIFICANT CHANGE UP (ref 22–31)
COLOR SPEC: YELLOW — SIGNIFICANT CHANGE UP
COMMENT - URINE: SIGNIFICANT CHANGE UP
CREAT SERPL-MCNC: 1.4 MG/DL — HIGH (ref 0.5–1.3)
DIFF PNL FLD: ABNORMAL
EGFR: 35 ML/MIN/1.73M2 — LOW
EPI CELLS # UR: ABNORMAL
GLUCOSE SERPL-MCNC: 97 MG/DL — SIGNIFICANT CHANGE UP (ref 70–99)
GLUCOSE UR QL: NEGATIVE — SIGNIFICANT CHANGE UP
KETONES UR-MCNC: NEGATIVE — SIGNIFICANT CHANGE UP
LEUKOCYTE ESTERASE UR-ACNC: ABNORMAL
LIDOCAIN IGE QN: 110 U/L — SIGNIFICANT CHANGE UP (ref 73–393)
NITRITE UR-MCNC: NEGATIVE — SIGNIFICANT CHANGE UP
PH UR: 6 — SIGNIFICANT CHANGE UP (ref 5–8)
POTASSIUM SERPL-MCNC: 3.4 MMOL/L — LOW (ref 3.5–5.3)
POTASSIUM SERPL-SCNC: 3.4 MMOL/L — LOW (ref 3.5–5.3)
PROT SERPL-MCNC: 8 G/DL — SIGNIFICANT CHANGE UP (ref 6–8.3)
PROT UR-MCNC: 15
RBC CASTS # UR COMP ASSIST: ABNORMAL /HPF (ref 0–4)
SODIUM SERPL-SCNC: 139 MMOL/L — SIGNIFICANT CHANGE UP (ref 135–145)
SP GR SPEC: 1 — LOW (ref 1.01–1.02)
UROBILINOGEN FLD QL: NEGATIVE — SIGNIFICANT CHANGE UP
WBC UR QL: ABNORMAL

## 2022-04-05 PROCEDURE — 85025 COMPLETE CBC W/AUTO DIFF WBC: CPT

## 2022-04-05 PROCEDURE — 36415 COLL VENOUS BLD VENIPUNCTURE: CPT

## 2022-04-05 PROCEDURE — 71045 X-RAY EXAM CHEST 1 VIEW: CPT

## 2022-04-05 PROCEDURE — 83690 ASSAY OF LIPASE: CPT

## 2022-04-05 PROCEDURE — 80053 COMPREHEN METABOLIC PANEL: CPT

## 2022-04-05 PROCEDURE — 81001 URINALYSIS AUTO W/SCOPE: CPT

## 2022-04-05 PROCEDURE — 99283 EMERGENCY DEPT VISIT LOW MDM: CPT | Mod: 25

## 2022-04-05 PROCEDURE — 87086 URINE CULTURE/COLONY COUNT: CPT

## 2022-04-05 RX ADMIN — Medication 1 TABLET(S): at 01:26

## 2022-04-06 LAB
CULTURE RESULTS: SIGNIFICANT CHANGE UP
SPECIMEN SOURCE: SIGNIFICANT CHANGE UP

## 2022-10-09 ENCOUNTER — EMERGENCY (EMERGENCY)
Facility: HOSPITAL | Age: 88
LOS: 1 days | Discharge: HOME | End: 2022-10-09
Attending: EMERGENCY MEDICINE | Admitting: EMERGENCY MEDICINE
Payer: MEDICARE

## 2022-10-09 VITALS
HEART RATE: 80 BPM | WEIGHT: 139.99 LBS | TEMPERATURE: 98 F | SYSTOLIC BLOOD PRESSURE: 146 MMHG | OXYGEN SATURATION: 97 % | HEIGHT: 65 IN | DIASTOLIC BLOOD PRESSURE: 76 MMHG | RESPIRATION RATE: 18 BRPM

## 2022-10-09 VITALS
TEMPERATURE: 98 F | SYSTOLIC BLOOD PRESSURE: 142 MMHG | HEART RATE: 74 BPM | OXYGEN SATURATION: 97 % | RESPIRATION RATE: 18 BRPM | DIASTOLIC BLOOD PRESSURE: 76 MMHG

## 2022-10-09 DIAGNOSIS — Z98.89 OTHER SPECIFIED POSTPROCEDURAL STATES: Chronic | ICD-10-CM

## 2022-10-09 DIAGNOSIS — H26.493 OTHER SECONDARY CATARACT, BILATERAL: Chronic | ICD-10-CM

## 2022-10-09 DIAGNOSIS — R19.00 INTRA-ABDOMINAL AND PELVIC SWELLING, MASS AND LUMP, UNSPECIFIED SITE: Chronic | ICD-10-CM

## 2022-10-09 DIAGNOSIS — Z90.721 ACQUIRED ABSENCE OF OVARIES, UNILATERAL: Chronic | ICD-10-CM

## 2022-10-09 PROCEDURE — 72125 CT NECK SPINE W/O DYE: CPT | Mod: MA

## 2022-10-09 PROCEDURE — 73130 X-RAY EXAM OF HAND: CPT

## 2022-10-09 PROCEDURE — 72125 CT NECK SPINE W/O DYE: CPT | Mod: 26,MA

## 2022-10-09 PROCEDURE — 99284 EMERGENCY DEPT VISIT MOD MDM: CPT | Mod: 25

## 2022-10-09 PROCEDURE — 70450 CT HEAD/BRAIN W/O DYE: CPT | Mod: MA

## 2022-10-09 PROCEDURE — 70486 CT MAXILLOFACIAL W/O DYE: CPT | Mod: MA

## 2022-10-09 PROCEDURE — 70450 CT HEAD/BRAIN W/O DYE: CPT | Mod: 26,MA

## 2022-10-09 PROCEDURE — 73130 X-RAY EXAM OF HAND: CPT | Mod: 26,RT

## 2022-10-09 PROCEDURE — 73562 X-RAY EXAM OF KNEE 3: CPT

## 2022-10-09 PROCEDURE — 99284 EMERGENCY DEPT VISIT MOD MDM: CPT | Mod: FS

## 2022-10-09 PROCEDURE — 70486 CT MAXILLOFACIAL W/O DYE: CPT | Mod: 26,MA

## 2022-10-09 PROCEDURE — 73562 X-RAY EXAM OF KNEE 3: CPT | Mod: 26,RT

## 2022-10-09 RX ORDER — ACETAMINOPHEN 500 MG
650 TABLET ORAL ONCE
Refills: 0 | Status: COMPLETED | OUTPATIENT
Start: 2022-10-09 | End: 2022-10-09

## 2022-10-09 RX ADMIN — Medication 650 MILLIGRAM(S): at 14:45

## 2022-10-09 NOTE — ED PROVIDER NOTE - CONSTITUTIONAL, MLM
normal... Well appearing, awake, alert, oriented to person, place, time/situation and in no apparent distress. swelling to right upper orbit and maxillary area

## 2022-10-09 NOTE — ED ADULT NURSE NOTE - SKIN CAPILLARY REFILL
Show Applicator Variable?: Yes Render Note In Bullet Format When Appropriate: No Detail Level: Detailed Post-Care Instructions: I reviewed with the patient in detail post-care instructions. Patient is to wear sunprotection, and avoid picking at any of the treated lesions. Pt may apply Vaseline to crusted or scabbing areas. Duration Of Freeze Thaw-Cycle (Seconds): 0 Consent: The patient's consent was obtained including but not limited to risks of crusting, scabbing, blistering, scarring, darker or lighter pigmentary change, recurrence, incomplete removal and infection. 2 seconds or less

## 2022-10-09 NOTE — ED PROVIDER NOTE - MUSCULOSKELETAL, MLM
Spine appears normal, range of motion is not limited, no muscle or joint tenderness right hand ttp 5th mcp pain with rom mild ttp right knee nrom no abrasion

## 2022-10-09 NOTE — ED PROVIDER NOTE - CARE PROVIDER_API CALL
Toro Carcamo)  Orthopedics  [address], [address]  [place] [address]  Phone: [phone]  Fax: [phone]  Follow Up Time:

## 2022-10-09 NOTE — ED PROVIDER NOTE - SKIN, MLM
Hospitalist History and Physical  name: Kaelyn Pual  Room: 65/79  Date of admission: 12/25/2021    Primary care provider:  Dr. Gal Walsh:  Reason for Admission: Weakness    History ofPresent Illness: Patient is a 81year old female wi 1934   • CARPAL TUNNEL RELEASE Bilateral    • CATARACT  march, 2006   • CHOLECYSTECTOMY  1970   • COLONOSCOPY  9/2004, 11/2012   • HIP REPLACEMENT SURGERY      bilateral   • IR IVC FILTER PLACEMENT     • KNEE REPLACEMENT SURGERY  12/1991    R knee, total % —   12/25/21 1715 138/68 52 20 96 % —   12/25/21 1630 114/55 51 20 95 % —       General: NAD  HEENT: NC/AT, MMM, OP clear with no exudates, PERRL.   Neck: Supple, No LAD  Heart: Normal s1/s2, no MRG  Chest: CTAB: NT/ND, soft, + Bs, no HSM  Ext: no periphe OTHER: There is some sort of electrodes/monitoring device projected over the central chest, could be on the anterior or posterior chest wall.          CONCLUSION:   Mildly coarsened interstitial markings and patchy scattered ground-glass subtle opacities, p contrast pending. 3. Bilateral lower extremity cellulitis: Continue antibiotic. Will consult infectious disease. 4. Acute kidney injury: Likely dehydration. Nephrology consulted. 5. Atrial fibrillation: Rate controlled.   Hold Coumadin due to suprather Skin normal color for race, warm, dry and intact.

## 2022-10-09 NOTE — ED ADULT NURSE NOTE - BEFAST SPEECH SLURRED
Instrucciones para el manjit: cuidados después de un procedimiento de cateterismo/angiograma en la ángel de la pierna (compresa para hemostasia SoftSeal)     Actividad  Twan las siguientes 24 horas: siga estas pautas relacionadas con el medicamento de sedación que se le administró.   · Debe pedirle a alguien que le lleve a casa.  · Puede sentir se cansado, inestable o no del todo giselle sí mismo twan el jayshree del día debido al medicamento de sedación. Por lo general, alejandre organismo elimina el medicamento dentro de las 24 horas.  · No conduzca ni opere maquinaria o herramientas eléctricas.  · No krishna ni fume.  · No tome decisiones ni firme documentos importantes.      Twan las siguientes 48 horas: siga estas pautas relacionadas con la ángel de punción en la pierna.  · No levante objetos pesados (de más de 10 libras).  · Evite movimientos donde tenga que empujar o jalar, giselle pasar la aspiradora, cortar el césped, soplar o palear ira.  · No tawnya ejercicios, deporte ni tenga actividad sexual.  · Use las escaleras solo cuando sea necesario; si las usa, apóyese con la pierna no afectada.  · Vuelva a alejandre rutina diaria (a excepción de las restricciones anteriormente indicadas) y elija usar la pierna no usada para el procedimiento.  · Si realiza labores físicas pesadas en alejandre trabajo, siga las instrucciones del médico sobre cuándo puede volver a trabajar.  · Al toser, estornudar o realizar algún esfuerzo (giselle en erik de evacuación), presione en la ángel de punción de la pierna para disminuir la posibilidad de sangrado.    Cuidado de la ángel del procedimiento con yovany compresa para hemostasia SoftSeal  · Mantenga la compresa en el lugar twan 24 horas.  · Puede retirar la compresa transparente y la gasa después de 24 horas.   · Moje la compresa SoftSeal en agua (en la ducha o con un paño limpio muy húmedo) y retire con cuidado. Si la compresa no sale con facilidad, aplique más agua.   · Limpie la ángel suavemente con agua y  jabón, con un paño limpio, y luego seque presionando suavemente; no aplique lociones, ungüentos ni cremas.  · Coloque yovany curita y cámbiela a diario twan dos días.  · No se siente en la bañera, bañera para hidromasaje ni en yovany piscina hasta que la herida haya cicatrizado por completo.        Efectos secundarios comunes que puede observar  · Dolor en la ángel de la punción.  · Hematoma leve en la ángel twan varios días y hasta varias semanas.  · Protuberancia del tamaño de un guisante o yovany canica en la ángel de punción twan algunas semanas.    Dieta y líquidos  · Reanude la dieta que consumía antes de alejandre ingreso.  · Si tuvo yovany angiografía, un cateterismo o le colocaron un stent, krishna líquido en abundancia para ayudar a eliminar de alejandre organismo el tinte utilizado para el procedimiento (a menos que alejandre médico haya restringido alejandre ingesta de líquidos).    Medicamento  [place] un analgésico suave, giselle Tylenol/paracetamol, según sea necesario para aliviar el dolor.    Llame a alejandre médico en erik de  · Sangrado abundante de la ángel del procedimiento. Si presenta sangrado o tiene yovany protuberancia que crece en la ángel, acuéstese y presione firmemente la ángel donde tiene la compresa. Llame al 911 y mantenga la presión en la ángel.  · Entumecimiento, hormigueo o cambio de color en la pierna utilizada para la punción.  · Aumento de dolor y firmeza cerca de la ángel de punción.  · Temperatura mayor a 101 grados.  · Enrojecimiento o secreción alrededor de la ángel.  Si tiene preguntas, llame a alejandre médico.      Cuidado de la ángel del procedimiento con yovany compresa para hemostasia SoftSeal  · Mantenga la compresa en el lugar twan 24 horas.  · Puede retirar la compresa transparente y la gasa después de 24 horas.   · Moje la compresa SoftSeal en agua (en la ducha o con un paño limpio muy húmedo) y retire con cuidado. Si la compresa no sale con facilidad, aplique más agua.   · Limpie la ángel suavemente con agua y jabón, con un paño  limpio, y luego seque presionando suavemente; no aplique lociones, ungüentos ni cremas.  · Coloque un curita y cámbiela a diario twan dos días.  · No se siente en la bañera, bañera para hidromasaje ni en yovany piscina hasta que la herida haya cicatrizado por completo.      Discharge Instructions :  Care After Your Catheterization/Angiogram Procedure Using the Leg Site (SoftSeal Hemostasis Pad)    Activity  For the next 24 hours:  Follow these guidelines related to the sedation medicine that you've received.   · You must have someone drive you home.  · You may feel tired, unsteady, or not quite yourself for the remainder of the day due to the sedation medicine. Your body gets rid of the medicine usually in 24 hours.  · Do not drive or operate machinery or power tools.  · Do not drink alcohol or smoke.  · Do not make any important decisions or sign important papers.      For the next 48 hours:  Follow these guidelines related to the puncture site in your leg.  · No heavy lifting (over 10 lbs)  · Avoid pushing or pulling motions, such as vacuuming, mowing, snow blowing or shoveling.  · No exercise, sports, or sexual activity.  · Use stairs only when needed; if using stairs, lead with the unaffected leg.  · Return to your daily routine (except for the restrictions listed above) and do not favor the leg used for the procedure.  · If you do heavy physical work on your job, follow your doctor's instructions about when you may return to work.  · When you cough, sneeze or strain (as with a bowel movement), hold pressure on the leg puncture site to lessen the chance of bleeding.    Care of the procedure site with a SoftSeal Hemostasis Pad  · Keep the dressing in place for 24 hours.  · You may remove the clear dressing and gauze after 24 hours.   · Soak SoftSeal pad in water (in shower or with very damp clean wash cloth) and gently remove. If pad does not come off easily, apply more water.   · Cleanse the site gently with  soap and water, using a clean washcloth, then pat dry; Apply no lotion, ointments or creams.  · Apply band aid and change daily for 2 days.  · Do not sit in the bathtub, hot tub or a pool of water until the wound has completely healed.    Common side effects you may notice  · Soreness at puncture site.  · Slight bruising at the site for several days to several weeks.  · Lump the size of a pea or marble at the puncture site for a few weeks.    Diet/Fluids  · Resume diet as prior to admission.  · If you had an angiogram, catheterization, or stent, drink plenty of liquids to help flush the dye used for your procedure out of your body (unless you're on a fluid restriction ordered by your physician).    Medication  Take mild pain reliever such as Tylenol/Acetaminophen as needed for pain.    Call your doctor if you have  · Significant bleeding from the procedure site. If bleeding occurs or there is a growing lump at your site, lie down and apply firm pressure at the site where your dressing is. Call 911 and keep pressure on the site.  · Numbness, tingling or color change in the leg used for puncture site.  · Increasing pain and firmness near the puncture site.  · A temperature greater than 101 degrees.  · Redness or drainage around site.  If you have questions, call your doctor.      SMOKING CESSATION:  If you are a smoker, we encourage you to explore options for quitting. Caregivers may have spoken with you about this while you were in the hospital. We encourage you to talk to your doctor about nicotine replacement options. Smoking cessation is the best thing you can do for your health.   Tobacco Quit Line is free of charge to [place] residents. Please call 8-800QUIT-NOW for information and resources available to you.      No

## 2022-10-09 NOTE — ED PROVIDER NOTE - PATIENT PORTAL LINK FT
You can access the FollowMyHealth Patient Portal offered by [facility] by registering at the following website: [email or web address]. By joining [employer]’s FollowMyHealth portal, you will also be able to view your health information using other applications (apps) compatible with our system.

## 2022-10-09 NOTE — ED PROVIDER NOTE - CLINICAL SUMMARY MEDICAL DECISION MAKING FREE TEXT BOX
Right-sidedPatient is a 89-year-old female who presents to the emergency room with a chief complaint of shoulder pain status post mechanical fall.  Past medical history of ROXY. zach on Xarelto, history of GERD, hypertension, hypothyroidism, history of arthritis.  Patient reports that she was getting up after eating and was walking to the bathroom when she lost her footing and fell forward striking her facial region.  Patient reports that she normally ambulates with a walker but was not using it at the time of the incident.  She reports right-sided facial pain right knee and hand pain.  Denies any LOC.  Denies any headache visual changes nausea vomiting chest pain shortness of breath abdominal pain extremity numbness or weakness.  On exam patient is laying in bed awake and baseline per family who is at bedside in no acute distress but anxious to go home contusion noted to the right superior and inferior orbital region with no bony deformity noted no tenderness to palpation to the left orbital region.  Pupils are equal round and reactive extraocular muscles are intact.  No nasal bone tenderness to palpation no epistaxis noted.  Heart is regular rate lungs are clear to auscultation abdomen soft nontender nondistended.  There is no chest wall tenderness to palpation.  No midline C-T-L tenderness to palpation.  Full range of motion of bilateral upper extremities and lower extremities with no significant joint reproducible pain on exam.  Patient is neurovascular intact x 4.  Patient with an abrasion between the PIP and DIP joint of the third digit right hand radial aspect no acute bony tenderness.  Also with ecchymosis to the ulnar aspect of the fifth metacarpal region although again no acute bony deformity.  Patient does have significant deformity to the right hand at baseline secondary to advanced arthritis.  Sensation is grossly intact to the hand cap refills less than 2 seconds positive radial pulse.  Patient with ecchymosis noted to bilateral anterior knees with no acute bony tenderness to palpation and full range of motion of the knees.  No pelvic tenderness to palpation or instability noted.  Patient is presenting the emergency room status post mechanical fall with facial injury and hand pain.  Neurovascular intact at this time and at baseline.  Family at bedside confirming this.  Will obtain CT head cervical spine and facial bones.  Will x-ray hand and right knee.  CT imaging noted there is no evidence of acute hemorrhagic bleed no facial fractures or cervical fracture.  There was a questionable fracture in the hand but family had a friend who is a radiologist review the films they do not believe there is an acute fracture and they were refusing splinting reporting that patient has several of these at home and does not wear them.  Family is requesting discharge copy of all results provided patient is ambulatory in the department at this time.

## 2022-10-09 NOTE — ED ADULT NURSE NOTE - OBJECTIVE STATEMENT
pt alert, confused, Hx Dementia, fell today, head, rt knee, rt hand injury, no loc, FROM 4 extremities"

## 2022-10-09 NOTE — ED PROVIDER NOTE - NSFOLLOWUPINSTRUCTIONS_ED_ALL_ED_FT
Follow up with pcp and orthopedics  possible fracture in right hand keep splint   return to er for any worsening symptoms       Fall Prevention in the Home, Adult      Falls can cause injuries and can happen to people of all ages. There are many things you can do to make your home safe and to help prevent falls. Ask for help when making these changes.      What actions can I take to prevent falls?    General Instructions     •Use good lighting in all rooms. Replace any light bulbs that burn out.      •Turn on the lights in dark areas. Use night-lights.      •Keep items that you use often in easy-to-reach places. Lower the shelves around your home if needed.      •Set up your furniture so you have a clear path. Avoid moving your furniture around.      • Do not have throw rugs or other things on the floor that can make you trip.      •Avoid walking on wet floors.      •If any of your floors are uneven, fix them.    •Add color or contrast paint or tape to clearly marylu and help you see:  •Grab bars or handrails.      •First and last steps of staircases.      •Where the edge of each step is.      •If you use a stepladder:  •Make sure that it is fully opened. Do not climb a closed stepladder.      •Make sure the sides of the stepladder are locked in place.      •Ask someone to hold the stepladder while you use it.        •Know where your pets are when moving through your home.        What can I do in the bathroom?                   •Keep the floor dry. Clean up any water on the floor right away.      •Remove soap buildup in the tub or shower.      •Use nonskid mats or decals on the floor of the tub or shower.      •Attach bath mats securely with double-sided, nonslip rug tape.      •If you need to sit down in the shower, use a plastic, nonslip stool.      •Install grab bars by the toilet and in the tub and shower. Do not use towel bars as grab bars.      What can I do in the bedroom?     •Make sure that you have a light by your bed that is easy to reach.      • Do not use any sheets or blankets for your bed that hang to the floor.      •Have a firm chair with side arms that you can use for support when you get dressed.      What can I do in the kitchen?     •Clean up any spills right away.      •If you need to reach something above you, use a step stool with a grab bar.      •Keep electrical cords out of the way.      • Do not use floor polish or wax that makes floors slippery.      What can I do with my stairs?     • Do not leave any items on the stairs.      •Make sure that you have a light switch at the top and the bottom of the stairs.      •Make sure that there are handrails on both sides of the stairs. Fix handrails that are broken or loose.      •Install nonslip stair treads on all your stairs.      •Avoid having throw rugs at the top or bottom of the stairs.      •Choose a carpet that does not hide the edge of the steps on the stairs.      •Check carpeting to make sure that it is firmly attached to the stairs. Fix carpet that is loose or worn.      What can I do on the outside of my home?     •Use bright outdoor lighting.      •Fix the edges of walkways and driveways and fix any cracks.      •Remove anything that might make you trip as you walk through a door, such as a raised step or threshold.      •Trim any bushes or trees on paths to your home.      •Check to see if handrails are loose or broken and that both sides of all steps have handrails.      •Install guardrails along the edges of any raised decks and porches.      •Clear paths of anything that can make you trip, such as tools or rocks.      •Have leaves, snow, or ice cleared regularly.      •Use sand or salt on paths during winter.      •Clean up any spills in your garage right away. This includes grease or oil spills.      What other actions can I take?   •Wear shoes that:  •Have a low heel. Do not wear high heels.      •Have rubber bottoms.      •Feel good on your feet and fit well.      •Are closed at the toe. Do not wear open-toe sandals.      •Use tools that help you move around if needed. These include:  •Canes.      •Walkers.      •Scooters.      •Crutches.        •Review your medicines with your doctor. Some medicines can make you feel dizzy. This can increase your chance of falling.      Ask your doctor what else you can do to help prevent falls.      Where to find more information    •Centers for Disease Control and Prevention, STEAD[place]: www.cdc.gov      •[place] [employer]: www.lynsey.nih.gov        Contact a doctor if:    •You are afraid of falling at home.      •You feel weak, drowsy, or dizzy at home.      •You fall at home.        Summary    •There are many simple things that you can do to make your home safe and to help prevent falls.      •Ways to make your home safe include removing things that can make you trip and installing grab bars in the bathroom.      •Ask for help when making these changes in your home.      This information is not intended to replace advice given to you by your health care provider. Make sure you discuss any questions you have with your health care provider.      Document Revised: 07/21/2021 Document Reviewed: 07/21/2021    Elsevier Patient Education © 2022 [employer]

## 2022-10-09 NOTE — ED PROVIDER NOTE - OBJECTIVE STATEMENT
Patient is of 89-year-old female with past medical history of A. fib GERD hypertension hypothyroidism plan by EMS status post fall.  States she was getting up after eating and walking to the bathroom lost her footing and fell.  Patient states she is had been using a walker but was not patient complaining of pain to the right side of face right knee and right hand.  Patient denies any LOC chest pain shortness of breath dizziness abdominal pain vomiting.

## 2022-10-09 NOTE — ED ADULT NURSE NOTE - MUSCLE PAIN OR WEAKNESS
Patient can not afford the Multaq and is asking if there is an alternative or assistance for the medication.    no

## 2022-12-14 NOTE — PATIENT PROFILE ADULT - OVER THE PAST TWO WEEKS HAVE YOU FELT DOWN, DEPRESSED OR HOPELESS?
Stroke RF  A1c 9.1, consider nutrition consult and DM education  BG goal while inpatient 140-180  Currently on Aspart 8units q4h + aspart 1-10U q4h PRN   yes

## 2023-03-04 ENCOUNTER — INPATIENT (INPATIENT)
Facility: HOSPITAL | Age: 89
LOS: 2 days | Discharge: HOME | DRG: 689 | End: 2023-03-07
Attending: FAMILY MEDICINE | Admitting: INTERNAL MEDICINE
Payer: MEDICARE

## 2023-03-04 VITALS
HEART RATE: 62 BPM | HEIGHT: 62 IN | DIASTOLIC BLOOD PRESSURE: 75 MMHG | OXYGEN SATURATION: 96 % | WEIGHT: 160.06 LBS | TEMPERATURE: 97 F | SYSTOLIC BLOOD PRESSURE: 163 MMHG | RESPIRATION RATE: 16 BRPM

## 2023-03-04 DIAGNOSIS — Z29.9 ENCOUNTER FOR PROPHYLACTIC MEASURES, UNSPECIFIED: ICD-10-CM

## 2023-03-04 DIAGNOSIS — I10 ESSENTIAL (PRIMARY) HYPERTENSION: ICD-10-CM

## 2023-03-04 DIAGNOSIS — Z98.89 OTHER SPECIFIED POSTPROCEDURAL STATES: Chronic | ICD-10-CM

## 2023-03-04 DIAGNOSIS — R41.82 ALTERED MENTAL STATUS, UNSPECIFIED: ICD-10-CM

## 2023-03-04 DIAGNOSIS — K21.9 GASTRO-ESOPHAGEAL REFLUX DISEASE WITHOUT ESOPHAGITIS: ICD-10-CM

## 2023-03-04 DIAGNOSIS — R62.7 ADULT FAILURE TO THRIVE: ICD-10-CM

## 2023-03-04 DIAGNOSIS — Z90.721 ACQUIRED ABSENCE OF OVARIES, UNILATERAL: Chronic | ICD-10-CM

## 2023-03-04 DIAGNOSIS — R53.1 WEAKNESS: ICD-10-CM

## 2023-03-04 DIAGNOSIS — H26.493 OTHER SECONDARY CATARACT, BILATERAL: Chronic | ICD-10-CM

## 2023-03-04 DIAGNOSIS — R19.00 INTRA-ABDOMINAL AND PELVIC SWELLING, MASS AND LUMP, UNSPECIFIED SITE: Chronic | ICD-10-CM

## 2023-03-04 DIAGNOSIS — E03.9 HYPOTHYROIDISM, UNSPECIFIED: ICD-10-CM

## 2023-03-04 LAB
ALBUMIN SERPL ELPH-MCNC: 3.6 G/DL — SIGNIFICANT CHANGE UP (ref 3.3–5)
ALP SERPL-CCNC: 52 U/L — SIGNIFICANT CHANGE UP (ref 40–120)
ALT FLD-CCNC: 20 U/L — SIGNIFICANT CHANGE UP (ref 12–78)
ANION GAP SERPL CALC-SCNC: 7 MMOL/L — SIGNIFICANT CHANGE UP (ref 5–17)
APPEARANCE UR: ABNORMAL
APTT BLD: 26.4 SEC — LOW (ref 27.5–35.5)
AST SERPL-CCNC: 18 U/L — SIGNIFICANT CHANGE UP (ref 15–37)
BACTERIA # UR AUTO: ABNORMAL
BASOPHILS # BLD AUTO: 0.06 K/UL — SIGNIFICANT CHANGE UP (ref 0–0.2)
BASOPHILS NFR BLD AUTO: 0.7 % — SIGNIFICANT CHANGE UP (ref 0–2)
BILIRUB SERPL-MCNC: 0.5 MG/DL — SIGNIFICANT CHANGE UP (ref 0.2–1.2)
BILIRUB UR-MCNC: NEGATIVE — SIGNIFICANT CHANGE UP
BUN SERPL-MCNC: 18 MG/DL — SIGNIFICANT CHANGE UP (ref 7–23)
CALCIUM SERPL-MCNC: 9.1 MG/DL — SIGNIFICANT CHANGE UP (ref 8.5–10.1)
CHLORIDE SERPL-SCNC: 104 MMOL/L — SIGNIFICANT CHANGE UP (ref 96–108)
CK MB BLD-MCNC: 1.7 % — SIGNIFICANT CHANGE UP (ref 0–3.5)
CK MB CFR SERPL CALC: 2 NG/ML — SIGNIFICANT CHANGE UP (ref 0–3.6)
CK MB CFR SERPL CALC: 2.2 NG/ML — SIGNIFICANT CHANGE UP (ref 0–3.6)
CK SERPL-CCNC: 127 U/L — SIGNIFICANT CHANGE UP (ref 26–192)
CO2 SERPL-SCNC: 26 MMOL/L — SIGNIFICANT CHANGE UP (ref 22–31)
COLOR SPEC: SIGNIFICANT CHANGE UP
CREAT SERPL-MCNC: 1 MG/DL — SIGNIFICANT CHANGE UP (ref 0.5–1.3)
DIFF PNL FLD: ABNORMAL
EGFR: 52 ML/MIN/1.73M2 — LOW
EOSINOPHIL # BLD AUTO: 0.49 K/UL — SIGNIFICANT CHANGE UP (ref 0–0.5)
EOSINOPHIL NFR BLD AUTO: 6.1 % — HIGH (ref 0–6)
EPI CELLS # UR: SIGNIFICANT CHANGE UP
FLUAV AG NPH QL: SIGNIFICANT CHANGE UP
FLUBV AG NPH QL: SIGNIFICANT CHANGE UP
GLUCOSE SERPL-MCNC: 142 MG/DL — HIGH (ref 70–99)
GLUCOSE UR QL: NEGATIVE — SIGNIFICANT CHANGE UP
HCT VFR BLD CALC: 42.8 % — SIGNIFICANT CHANGE UP (ref 34.5–45)
HGB BLD-MCNC: 13.8 G/DL — SIGNIFICANT CHANGE UP (ref 11.5–15.5)
IMM GRANULOCYTES NFR BLD AUTO: 0.7 % — SIGNIFICANT CHANGE UP (ref 0–0.9)
INR BLD: 1.07 RATIO — SIGNIFICANT CHANGE UP (ref 0.88–1.16)
KETONES UR-MCNC: NEGATIVE — SIGNIFICANT CHANGE UP
LACTATE SERPL-SCNC: 1.1 MMOL/L — SIGNIFICANT CHANGE UP (ref 0.7–2)
LEUKOCYTE ESTERASE UR-ACNC: NEGATIVE — SIGNIFICANT CHANGE UP
LIDOCAIN IGE QN: 174 U/L — SIGNIFICANT CHANGE UP (ref 73–393)
LYMPHOCYTES # BLD AUTO: 1.87 K/UL — SIGNIFICANT CHANGE UP (ref 1–3.3)
LYMPHOCYTES # BLD AUTO: 23.3 % — SIGNIFICANT CHANGE UP (ref 13–44)
MAGNESIUM SERPL-MCNC: 2.1 MG/DL — SIGNIFICANT CHANGE UP (ref 1.6–2.6)
MCHC RBC-ENTMCNC: 30.5 PG — SIGNIFICANT CHANGE UP (ref 27–34)
MCHC RBC-ENTMCNC: 32.2 GM/DL — SIGNIFICANT CHANGE UP (ref 32–36)
MCV RBC AUTO: 94.5 FL — SIGNIFICANT CHANGE UP (ref 80–100)
MONOCYTES # BLD AUTO: 0.52 K/UL — SIGNIFICANT CHANGE UP (ref 0–0.9)
MONOCYTES NFR BLD AUTO: 6.5 % — SIGNIFICANT CHANGE UP (ref 2–14)
NEUTROPHILS # BLD AUTO: 5.03 K/UL — SIGNIFICANT CHANGE UP (ref 1.8–7.4)
NEUTROPHILS NFR BLD AUTO: 62.7 % — SIGNIFICANT CHANGE UP (ref 43–77)
NITRITE UR-MCNC: POSITIVE
NRBC # BLD: 0 /100 WBCS — SIGNIFICANT CHANGE UP (ref 0–0)
NT-PROBNP SERPL-SCNC: 75 PG/ML — SIGNIFICANT CHANGE UP (ref 0–450)
PH UR: 7 — SIGNIFICANT CHANGE UP (ref 5–8)
PLATELET # BLD AUTO: 231 K/UL — SIGNIFICANT CHANGE UP (ref 150–400)
POTASSIUM SERPL-MCNC: 3.6 MMOL/L — SIGNIFICANT CHANGE UP (ref 3.5–5.3)
POTASSIUM SERPL-SCNC: 3.6 MMOL/L — SIGNIFICANT CHANGE UP (ref 3.5–5.3)
PROT SERPL-MCNC: 8.2 G/DL — SIGNIFICANT CHANGE UP (ref 6–8.3)
PROT UR-MCNC: 30 MG/DL
PROTHROM AB SERPL-ACNC: 12.5 SEC — SIGNIFICANT CHANGE UP (ref 10.5–13.4)
RBC # BLD: 4.53 M/UL — SIGNIFICANT CHANGE UP (ref 3.8–5.2)
RBC # FLD: 13.8 % — SIGNIFICANT CHANGE UP (ref 10.3–14.5)
RBC CASTS # UR COMP ASSIST: SIGNIFICANT CHANGE UP /HPF (ref 0–4)
RSV RNA NPH QL NAA+NON-PROBE: SIGNIFICANT CHANGE UP
SARS-COV-2 RNA SPEC QL NAA+PROBE: SIGNIFICANT CHANGE UP
SODIUM SERPL-SCNC: 137 MMOL/L — SIGNIFICANT CHANGE UP (ref 135–145)
SP GR SPEC: 1 — LOW (ref 1.01–1.02)
TROPONIN I, HIGH SENSITIVITY RESULT: 71.3 NG/L — HIGH
TROPONIN I, HIGH SENSITIVITY RESULT: 73.4 NG/L — HIGH
TSH SERPL-MCNC: 5.15 UIU/ML — HIGH (ref 0.36–3.74)
UROBILINOGEN FLD QL: NEGATIVE — SIGNIFICANT CHANGE UP
WBC # BLD: 8.03 K/UL — SIGNIFICANT CHANGE UP (ref 3.8–10.5)
WBC # FLD AUTO: 8.03 K/UL — SIGNIFICANT CHANGE UP (ref 3.8–10.5)
WBC UR QL: SIGNIFICANT CHANGE UP

## 2023-03-04 PROCEDURE — 71045 X-RAY EXAM CHEST 1 VIEW: CPT | Mod: 26

## 2023-03-04 PROCEDURE — 74176 CT ABD & PELVIS W/O CONTRAST: CPT | Mod: 26,MA

## 2023-03-04 PROCEDURE — 99222 1ST HOSP IP/OBS MODERATE 55: CPT | Mod: GC

## 2023-03-04 PROCEDURE — 70450 CT HEAD/BRAIN W/O DYE: CPT | Mod: 26,MA

## 2023-03-04 PROCEDURE — 99285 EMERGENCY DEPT VISIT HI MDM: CPT

## 2023-03-04 PROCEDURE — 93010 ELECTROCARDIOGRAM REPORT: CPT

## 2023-03-04 RX ORDER — LANOLIN ALCOHOL/MO/W.PET/CERES
3 CREAM (GRAM) TOPICAL AT BEDTIME
Refills: 0 | Status: DISCONTINUED | OUTPATIENT
Start: 2023-03-04 | End: 2023-03-07

## 2023-03-04 RX ORDER — RIVAROXABAN 15 MG-20MG
1 KIT ORAL
Qty: 0 | Refills: 0 | DISCHARGE

## 2023-03-04 RX ORDER — DILTIAZEM HCL 120 MG
300 CAPSULE, EXT RELEASE 24 HR ORAL DAILY
Refills: 0 | Status: DISCONTINUED | OUTPATIENT
Start: 2023-03-04 | End: 2023-03-07

## 2023-03-04 RX ORDER — ACETAMINOPHEN 500 MG
650 TABLET ORAL EVERY 6 HOURS
Refills: 0 | Status: DISCONTINUED | OUTPATIENT
Start: 2023-03-04 | End: 2023-03-07

## 2023-03-04 RX ORDER — FLUTICASONE PROPIONATE 50 MCG
1 SPRAY, SUSPENSION NASAL
Qty: 0 | Refills: 0 | DISCHARGE

## 2023-03-04 RX ORDER — FOLIC ACID 0.8 MG
1 TABLET ORAL DAILY
Refills: 0 | Status: DISCONTINUED | OUTPATIENT
Start: 2023-03-04 | End: 2023-03-07

## 2023-03-04 RX ORDER — LACTOBACILLUS ACIDOPHILUS 100MM CELL
1 CAPSULE ORAL EVERY 12 HOURS
Refills: 0 | Status: DISCONTINUED | OUTPATIENT
Start: 2023-03-04 | End: 2023-03-07

## 2023-03-04 RX ORDER — HEPARIN SODIUM 5000 [USP'U]/ML
5000 INJECTION INTRAVENOUS; SUBCUTANEOUS EVERY 8 HOURS
Refills: 0 | Status: DISCONTINUED | OUTPATIENT
Start: 2023-03-04 | End: 2023-03-07

## 2023-03-04 RX ORDER — QUETIAPINE FUMARATE 200 MG/1
25 TABLET, FILM COATED ORAL AT BEDTIME
Refills: 0 | Status: DISCONTINUED | OUTPATIENT
Start: 2023-03-04 | End: 2023-03-05

## 2023-03-04 RX ORDER — ONDANSETRON 8 MG/1
4 TABLET, FILM COATED ORAL EVERY 4 HOURS
Refills: 0 | Status: DISCONTINUED | OUTPATIENT
Start: 2023-03-04 | End: 2023-03-07

## 2023-03-04 RX ORDER — SERTRALINE 25 MG/1
1 TABLET, FILM COATED ORAL
Qty: 0 | Refills: 0 | DISCHARGE

## 2023-03-04 RX ORDER — ONDANSETRON 8 MG/1
4 TABLET, FILM COATED ORAL ONCE
Refills: 0 | Status: COMPLETED | OUTPATIENT
Start: 2023-03-04 | End: 2023-03-04

## 2023-03-04 RX ORDER — SODIUM CHLORIDE 9 MG/ML
1000 INJECTION INTRAMUSCULAR; INTRAVENOUS; SUBCUTANEOUS ONCE
Refills: 0 | Status: COMPLETED | OUTPATIENT
Start: 2023-03-04 | End: 2023-03-04

## 2023-03-04 RX ORDER — LEVOTHYROXINE SODIUM 125 MCG
75 TABLET ORAL DAILY
Refills: 0 | Status: DISCONTINUED | OUTPATIENT
Start: 2023-03-04 | End: 2023-03-07

## 2023-03-04 RX ORDER — POTASSIUM CHLORIDE 20 MEQ
10 PACKET (EA) ORAL DAILY
Refills: 0 | Status: DISCONTINUED | OUTPATIENT
Start: 2023-03-04 | End: 2023-03-07

## 2023-03-04 RX ORDER — SODIUM CHLORIDE 9 MG/ML
1000 INJECTION INTRAMUSCULAR; INTRAVENOUS; SUBCUTANEOUS
Refills: 0 | Status: DISCONTINUED | OUTPATIENT
Start: 2023-03-04 | End: 2023-03-06

## 2023-03-04 RX ORDER — FAMOTIDINE 10 MG/ML
20 INJECTION INTRAVENOUS ONCE
Refills: 0 | Status: COMPLETED | OUTPATIENT
Start: 2023-03-04 | End: 2023-03-04

## 2023-03-04 RX ORDER — FUROSEMIDE 40 MG
20 TABLET ORAL DAILY
Refills: 0 | Status: DISCONTINUED | OUTPATIENT
Start: 2023-03-04 | End: 2023-03-07

## 2023-03-04 RX ORDER — SERTRALINE 25 MG/1
150 TABLET, FILM COATED ORAL DAILY
Refills: 0 | Status: DISCONTINUED | OUTPATIENT
Start: 2023-03-04 | End: 2023-03-07

## 2023-03-04 RX ORDER — MIRTAZAPINE 45 MG/1
15 TABLET, ORALLY DISINTEGRATING ORAL AT BEDTIME
Refills: 0 | Status: DISCONTINUED | OUTPATIENT
Start: 2023-03-04 | End: 2023-03-07

## 2023-03-04 RX ORDER — PANTOPRAZOLE SODIUM 20 MG/1
40 TABLET, DELAYED RELEASE ORAL
Refills: 0 | Status: DISCONTINUED | OUTPATIENT
Start: 2023-03-04 | End: 2023-03-07

## 2023-03-04 RX ORDER — CEFTRIAXONE 500 MG/1
1000 INJECTION, POWDER, FOR SOLUTION INTRAMUSCULAR; INTRAVENOUS EVERY 24 HOURS
Refills: 0 | Status: DISCONTINUED | OUTPATIENT
Start: 2023-03-04 | End: 2023-03-06

## 2023-03-04 RX ORDER — QUETIAPINE FUMARATE 200 MG/1
25 TABLET, FILM COATED ORAL AT BEDTIME
Refills: 0 | Status: DISCONTINUED | OUTPATIENT
Start: 2023-03-04 | End: 2023-03-04

## 2023-03-04 RX ORDER — CHOLECALCIFEROL (VITAMIN D3) 125 MCG
2000 CAPSULE ORAL DAILY
Refills: 0 | Status: DISCONTINUED | OUTPATIENT
Start: 2023-03-04 | End: 2023-03-07

## 2023-03-04 RX ORDER — CETIRIZINE HYDROCHLORIDE 10 MG/1
1 TABLET ORAL
Qty: 0 | Refills: 0 | DISCHARGE

## 2023-03-04 RX ADMIN — SODIUM CHLORIDE 1000 MILLILITER(S): 9 INJECTION INTRAMUSCULAR; INTRAVENOUS; SUBCUTANEOUS at 10:39

## 2023-03-04 RX ADMIN — HEPARIN SODIUM 5000 UNIT(S): 5000 INJECTION INTRAVENOUS; SUBCUTANEOUS at 22:38

## 2023-03-04 RX ADMIN — Medication 10 MILLIEQUIVALENT(S): at 22:40

## 2023-03-04 RX ADMIN — Medication 75 MICROGRAM(S): at 22:41

## 2023-03-04 RX ADMIN — Medication 1 MILLIGRAM(S): at 22:41

## 2023-03-04 RX ADMIN — FAMOTIDINE 20 MILLIGRAM(S): 10 INJECTION INTRAVENOUS at 10:44

## 2023-03-04 RX ADMIN — ONDANSETRON 4 MILLIGRAM(S): 8 TABLET, FILM COATED ORAL at 10:38

## 2023-03-04 RX ADMIN — MIRTAZAPINE 15 MILLIGRAM(S): 45 TABLET, ORALLY DISINTEGRATING ORAL at 22:38

## 2023-03-04 RX ADMIN — CEFTRIAXONE 100 MILLIGRAM(S): 500 INJECTION, POWDER, FOR SOLUTION INTRAMUSCULAR; INTRAVENOUS at 17:05

## 2023-03-04 RX ADMIN — ONDANSETRON 4 MILLIGRAM(S): 8 TABLET, FILM COATED ORAL at 16:22

## 2023-03-04 RX ADMIN — Medication 1 TABLET(S): at 17:04

## 2023-03-04 RX ADMIN — SODIUM CHLORIDE 60 MILLILITER(S): 9 INJECTION INTRAMUSCULAR; INTRAVENOUS; SUBCUTANEOUS at 16:23

## 2023-03-04 RX ADMIN — QUETIAPINE FUMARATE 25 MILLIGRAM(S): 200 TABLET, FILM COATED ORAL at 22:42

## 2023-03-04 RX ADMIN — Medication 2000 UNIT(S): at 22:41

## 2023-03-04 RX ADMIN — Medication 20 MILLIGRAM(S): at 22:40

## 2023-03-04 RX ADMIN — SERTRALINE 150 MILLIGRAM(S): 25 TABLET, FILM COATED ORAL at 22:40

## 2023-03-04 RX ADMIN — Medication 300 MILLIGRAM(S): at 22:39

## 2023-03-04 NOTE — H&P ADULT - HISTORY OF PRESENT ILLNESS
89 yo F with PMH of dementia, afib (not on AC), hypothyroidism, R total hip replacement, GERD, HTN, presents to the ED with nausea and vomiting x1 day. Pt with some baseline dementia per son, has short term memory loss. Pt lives at [facility]. Hx supplemented by son at bedside. He states that last evening his mother called him and being nonsensical on the phone, stating things that were factually inaccurate. Also called her daughter with the same conversation. They were concerned for her mental status at that time. Son states that according to her aides, pt had poor PO intake last night, and then this morning was very cold, nauseous, and had 1-2 episodes of vomiting. Brought to the ED for evaluation. Pt endorses a general malaise, chills, and persistent nausea. Pt alert and oriented to self only.       ED Course:   Vitals: BP: 163/75, HR: 62 , Temp: 96.7 rectal , RR: 16 , SpO2: 96% on RA   Labs:  TSH 5.15, troponin 71.3 --> 73.4  UA:  turbid, protein 30, nitrite +, many bacteria  CXR: wnl  CT: < from: CT Head No Cont (03.04.23 @ 11:05) >  IMPRESSION:  Age-appropriate involutional change and microvascular   ischemic disease. No significant interval change 10/9/2022. No   intracranial hemorrhage.    < from: CT Abdomen and Pelvis No Cont (03.04.23 @ 11:05) >  IMPRESSION:  No CT evidence of acute intra-abdominal pathology. Moderate hiatal   hernia. Large amount of colonic stool suggesting constipation.    Probable small sludge in the gallbladder.    Cystic pancreatic lesions, one which may be new since the prior study. A   follow-up MRI/MRCP is recommended.    Mild bilateral hydroureteronephrosis without ureteral calculi which may   be secondary to a distended urinary bladder.    EKG: NSR @ 66bpm  Received in the ED: famotidine 20mg IVx1, zofran 4mg IV x1, 1L NS bolus

## 2023-03-04 NOTE — H&P ADULT - PROBLEM SELECTOR PLAN 3
Pt with TSH of 5.15, on levothyroxine 75mcg at home. ?contributing to hypothermia and weakness  - c/w home synthroid  - T3/free T4 ordered, f/u in AM

## 2023-03-04 NOTE — CONSULT NOTE ADULT - SUBJECTIVE AND OBJECTIVE BOX
[facility] DIVISION OF INFECTIOUS DISEASES  STEFFANIE Spivey S. Shah, Y. Patel, G. [facility]  [phone]  ([phone] - weekdays after 5pm and weekends)    TYRON FORTUNE  89y, Female  [id number]    HPI:  Patient is a 89 year old female with PMH of dementia, afib (not on AC), hypothyroidism, R total hip replacement, GERD, HTN, presents to the ED with nausea and vomiting x1 day. Pt with some baseline dementia per son, has short term memory loss. Pt lives at [facility]. Hx supplemented by son at bedside. He states that last evening his mother called him and being nonsensical on the phone, stating things that were factually inaccurate. Also called her daughter with the same conversation. They were concerned for her mental status at that time. Son states that according to her aides, pt had poor PO intake last night, and then this morning was very cold, nauseous, and had 1-2 episodes of vomiting. Brought to the ED for evaluation. Pt endorses a general malaise, chills, and persistent nausea. Pt alert and oriented to self only.   ED Course:  Vitals: BP: 163/75, HR: 62 , Temp: 96.7 rectal , RR: 16 , SpO2: 96% on RA. Labs:  TSH 5.15, troponin 71.3 --> 73.4  UA:  turbid, protein 30, nitrite +, many bacteria. CXR: wnl. CTAP with no acute intra-abdominal pathology, large stool burden, mild b/l hydronephrosis. EKG: NSR @ 66bpm. Received in the ED: famotidine 20mg IVx1, zofran 4mg IV x1, 1L NS bolus. (04 Mar 2023 14:52)  Patient seen and examined at bedside this afternoon in the ER. Patient reports having increased frequency and suprapubic pressure with fullness sensation. States she has been feeling weak and tired.   ROS: 14 point review of systems completed, pertinent positives and negatives as per HPI.    Allergies: Keflex (Other)    PMH -- Atrial fibrillation  GERD (gastroesophageal reflux disease)  Hypothyroidism, adult  HTN (hypertension)  Degenerative disc disease, cervical  Degenerative disc disease, lumbar  OA (osteoarthritis)  Sarcoma of omentum  Afib  GERD (gastroesophageal reflux disease)  Hypothyroidism    PSH -- S/P tonsillectomy  S/P oophorectomy  Abdominal mass  After cataract not obscuring vision, bilateral  Abdominal wall mass    FH -- Family history of emphysema  Family history of early CAD  Family history of breast cancer    Social History -- denies tobacco, alcohol or illicit drug use    Physical Exam--  Vital Signs Last 24 Hrs  T(F): 98 (04 Mar 2023 18:38), Max: 98 (04 Mar 2023 18:38)  HR: 69 (04 Mar 2023 18:38) (62 - 69)  BP: 139/76 (04 Mar 2023 18:38) (139/76 - 169/77)  RR: 18 (04 Mar 2023 18:38) (16 - 18)  SpO2: 94% (04 Mar 2023 18:38) (94% - 98%)  General: no acute distress  HEENT: NC/AT, EOMI, anicteric, neck supple  Lungs: Clear bilaterally without rales, wheezing or rhonchi  Heart: S1, S2 present, RRR. No murmur, rub or gallop.  Abdomen: Soft. Nondistended. Nontender. BS present.   Neuro: AAOx2, no obvious focal deficits   Extremities: No cyanosis or clubbing. No edema.   Skin: Warm. Dry. Good turgor. No visible rash.   Psychiatric: Appropriate affect and mood for situation.   Lines: PIV    Laboratory & Imaging Data--  CBC:                       13.8   8.03  )-----------( 231      ( 04 Mar 2023 08:55 )             42.8     WBC Count: 8.03 K/uL (23 @ 08:55)    CMP:     137  |  104  |  18  ----------------------------<  142<H>  3.6   |  26  |  1.00    Ca    9.1      04 Mar 2023 08:55  Mg     2.1     -04    TPro  8.2  /  Alb  3.6  /  TBili  0.5  /  DBili  x   /  AST  18  /  ALT  20  /  AlkPhos  52  03-04    LIVER FUNCTIONS - ( 04 Mar 2023 08:55 )  Alb: 3.6 g/dL / Pro: 8.2 g/dL / ALK PHOS: 52 U/L / ALT: 20 U/L / AST: 18 U/L / GGT: x           Urinalysis Basic - ( 04 Mar 2023 11:35 )  Color: Pale Yellow / Appearance: Slightly Turbid / S.005 / pH: x  Gluc: x / Ketone: Negative  / Bili: Negative / Urobili: Negative   Blood: x / Protein: 30 mg/dL / Nitrite: Positive   Leuk Esterase: Negative / RBC: 0-2 /HPF / WBC 0-2   Sq Epi: x / Non Sq Epi: Occasional / Bacteria: Many    Microbiology: reviewed  SARS-CoV-2 Result: NotDetec (04 Mar 2023 08:55)    Radiology--reviewed  < from: Xray Chest 1 View- PORTABLE-Urgent (Xray Chest 1 View- PORTABLE-Urgent .) (23 @ 12:39) >  Findings/  Impression:The heart is unremarkable. The lungs are clear. No acute   osseous abnormality.    < end of copied text >    < from: CT Abdomen and Pelvis No Cont (23 @ 11:05) >  IMPRESSION:  No CT evidence of acute intra-abdominal pathology. Moderate hiatal   hernia. Large amount of colonic stool suggesting constipation.    Probable small sludge in the gallbladder.    Cystic pancreatic lesions, one which may be new since the prior study. A   follow-up MRI/MRCP is recommended.    Mild bilateral hydroureteronephrosis without ureteral calculi which may   be secondary to a distended urinary bladder.    Additional findings noted above.    < end of copied text >    < from: CT Head No Cont (23 @ 11:05) >  IMPRESSION:  Age-appropriate involutional change and microvascular   ischemic disease. No significant interval change 10/9/2022. No   intracranial hemorrhage.    < end of copied text >    Active Medications--  acetaminophen     Tablet .. 650 milliGRAM(s) Oral every 6 hours PRN  aluminum hydroxide/magnesium hydroxide/simethicone Suspension 30 milliLiter(s) Oral every 4 hours PRN  cefTRIAXone   IVPB 1000 milliGRAM(s) IV Intermittent every 24 hours  cholecalciferol 2000 Unit(s) Oral daily  diltiazem    milliGRAM(s) Oral daily  folic acid 1 milliGRAM(s) Oral daily  furosemide    Tablet 20 milliGRAM(s) Oral daily  heparin   Injectable 5000 Unit(s) SubCutaneous every 8 hours  lactobacillus acidophilus 1 Tablet(s) Oral every 12 hours  levothyroxine 75 MICROGram(s) Oral daily  melatonin 3 milliGRAM(s) Oral at bedtime PRN  mirtazapine 15 milliGRAM(s) Oral at bedtime  ondansetron Injectable 4 milliGRAM(s) IV Push every 4 hours PRN  pantoprazole    Tablet 40 milliGRAM(s) Oral before breakfast  potassium chloride    Tablet ER 10 milliEquivalent(s) Oral daily  QUEtiapine 25 milliGRAM(s) Oral at bedtime PRN  sertraline 150 milliGRAM(s) Oral daily  sodium chloride 0.9%. 1000 milliLiter(s) IV Continuous <Continuous>    Current Antimicrobials:   cefTRIAXone   IVPB 1000 milliGRAM(s) IV Intermittent every 24 hours

## 2023-03-04 NOTE — CONSULT NOTE ADULT - ASSESSMENT
Patient is a 89 year old female with PMH of dementia, afib (not on AC), hypothyroidism, R total hip replacement, GERD, HTN, presents to the ED with nausea and vomiting x1 day with altered mental status, malaise and fatigue.     AMS with increase urinary frequency with suprapubic fullness -- rule out UTI  Constipation  - UA with no pyuria but noted with +nitrites and many bacteria   - CT with no acute pathology, noted constipation, cystic pancreatic lesions, small sludge in gb  - no positive prior cultures noted   - follow bladder scan  - follow blood and urine cultures  - noted with cephalexin allergy, but patient seems to be tolerating ceftriaxone   - continue on ceftriaxone 1g IV Q24h pending above   - monitor temps/WBC      Pardeep Saini M.D.  [facility] Division of Infectious Diseases  [phone]  After 5pm on weekdays and all day on weekends - please call [phone]

## 2023-03-04 NOTE — H&P ADULT - PROBLEM SELECTOR PLAN 1
Pt presents with 1 day AMS, weakness, nausea, hypothermia. UA with ?UTI although no wbcs. Pt endorsing urinary frequency.  - urine cx ordered, f/u results  - after cx obtained will empirically start ceftriaxone 1g qd  - bladder scan ordered  - trend wbcs, monitor for fevers  - psych consult, Dr. Cifuentes  - seroquel 25mg QHS PRN for agitation  - home med myrbetriq unavailable, ask family if they can bring in or consider oxybutinin. Pt presents with 1 day AMS, weakness, nausea, hypothermia. UA with ?UTI although no wbcs. Pt endorsing urinary frequency.  - urine cx ordered, f/u results  - pt with keflex allergy, rocephin not recommended. Will treat empirically with levaquin 250 qd x 3 days  - bladder scan ordered  - trend wbcs, monitor for fevers  - psych consult, Dr. Cifuentes  - seroquel 25mg QHS PRN for agitation  - home med myrbetriq unavailable, ask family if they can bring in or consider oxybutinin. Pt presents with 1 day AMS, weakness, nausea, hypothermia. UA with ?UTI although no wbcs. Pt endorsing urinary frequency.  - urine cx ordered, f/u results  - pt with keflex allergy, rocephin not recommended. Will treat empirically with levaquin 250 qd x 3 days  - bladder scan ordered  - trend wbcs, monitor for fevers  - c/w home sertraline  - psych consult, Dr. Cifuentes  - seroquel 25mg QHS PRN for agitation  - home med myrbetriq unavailable, ask family if they can bring in or consider oxybutynin Pt presents with 1 day AMS, weakness, nausea, hypothermia. UA with ?UTI although no wbcs. Pt endorsing urinary frequency.  - urine cx ordered, f/u results  - pt with keflex allergy but daughter confirms not anaphylactic reaction. Will treat empirically with rocephin 1g qd x 3 days  - ID consult, appreciate recs  - bladder scan ordered  - trend wbcs, monitor for fevers  - c/w home sertraline  - psych consult, Dr. Cifuentes  - seroquel 25mg QHS PRN for agitation  - home med myrbetriq unavailable, ask family if they can bring in or consider oxybutynin

## 2023-03-04 NOTE — H&P ADULT - NSHPREVIEWOFSYSTEMS_GEN_ALL_CORE
CONSTITUTIONAL: denies fever, +chills, +fatigue, +weakness  HEENT: denies blurred vision, sore throat  SKIN: denies new lesions, rash  CARDIOVASCULAR: denies chest pain, chest pressure, palpitations  RESPIRATORY: denies shortness of breath, cough, sputum production  GASTROINTESTINAL: + nausea, +vomiting, denies diarrhea, abdominal pain, melena or hematochezia  GENITOURINARY: denies dysuria, discharge, +frequency  NEUROLOGICAL: denies numbness, headache, focal weakness  MUSCULOSKELETAL: denies new joint pain, muscle aches  HEMATOLOGIC: denies gross bleeding, bruising

## 2023-03-04 NOTE — ED ADULT NURSE NOTE - NS ED NOTE  TALK SOMEONE YN
PATIENT NAME: RUDY HERNANDEZ   MEDICAL RECORD NUMBER: [id number]   ACCOUNT NUMBER: [id number]   ADMIT DATE: 12/15/2017   DISCHARGE DATE:   ATTENDING PHYSICIAN: BRISEIDA KAY MD   ROOM #: [id number]   SERVICE: MED                                      OPERATIVE REPORT         DATE OF SURGERY:  12/17/2017      SURGEON:  Mike Celestin M.D.      PREOPERATIVE DIAGNOSIS:  Meckel's diverticulum with bleeding.      POSTOPERATIVE DIAGNOSIS:  Meckel's diverticulum with bleeding.      PROCEDURE PERFORMED:  Diagnostic laparoscopy, hand-assisted resection of   Meckel's diverticulum.      ANESTHESIA:  General.      INDICATIONS FOR PROCEDURE:  The patient is a 27-year-old male with a few   episodes of mucusy blood per rectum, who had a Meckel scan showing Meckel's   diverticulum.      DESCRIPTION OF PROCEDURE:  After informed consent was obtained, the patient was   brought to the operating room and placed in the supine position on the operating   room table.  After general anesthesia was administered, the abdomen was prepped   and draped in usual sterile fashion and a time-out was performed.  Next, local   anesthesia was injected infraumbilically and an approximately 2-cm incision was   made in the skin and carried into the subcutaneous tissues.  The abdomen was   entered in open fashion and then an 11-mm trocar was placed.  Next, under direct   vision, two 5 mm ports were placed, one in the right upper quadrant and one in   the left lower quadrant.  The cecum was identified and the terminal ileum was   identified and then the small bowel was run back from the terminal ileum until   an area of induration and adhesions was identified with what appeared to be a   Meckel's diverticulum.  Because of the adhesions, it was difficult to free up   the small bowel.  It also seemed like mesentery in this segment of small bowel   was foreshortened slightly and I did not feel that I could free it up to do an   adequate resection  laparoscopically.  Therefore, the infraumbilical port site   was extended to approximately 12 to 15 cm and a hand port was placed.  The bowel   loop was brought up through this port and using a linear stapler, the small   bowel on either side of the diverticulum was divided.  Once the bowel was   divided, it was placed back into the abdomen.  An additional 10 port was placed   in the right lower quadrant and then the mesentery was divided laparoscopically   with hand assist.  Once the specimen was resected, it was removed and sent to   Pathology.  Prior to being sent, the bowel was opened up.  There was no obvious   site of bleeding.  However, within the diverticulum, there did appear to be an   ulcerated portion.  Now that the specimen of bowel could be brought up easily   through the hand port site, the two ends of the bowel brought up and taking care   to make sure that the mesentery was not twisted.  A side-to-side functional end-   to-end anastomosis was created with another fire of the linear stapler.  Once   the anastomosis was complete, the enterotomies were closed with another fire of   the linear stapler.  The mesentery defect was then closed with a running 3-0   Vicryl suture.  A crotch stitch was placed to take tension off the staple line.   The anastomosis was checked and found to be widely patent and then the bowel was   placed back into the abdomen.  The abdomen was then irrigated and checked for   hemostasis.  There were 2 small areas of bleeding along the staple line and   these were sutured with another 3-0 Vicryl.  Then, the ports were removed under   direct vision and the abdomen was desufflated.  The midline hand port incision   was closed with a #1 looped PDS suture.  The port sites were then irrigated with   saline and skin was closed with staples.  There were no complications.  The   patient tolerated the procedure well.  Instrument and lap counts were correct at   the end of the case.             _________________________________   Mike Celestin M.D. GENERAL SURGERY         CC:   MEG Shepard/LOLY   DD: 12/17/2017  DT: 12/17/2017   TD: 15:15  TT: 16:43   [id number]         normal performance No

## 2023-03-04 NOTE — H&P ADULT - ASSESSMENT
89 yo F with PMH of dementia, afib (not on AC), hypothyroidism, R total hip replacement, GERD, HTN, presents to the ED with chills, nausea and vomiting x1 day, admitted for AMS and failure to thrive.

## 2023-03-04 NOTE — H&P ADULT - NSHPPHYSICALEXAM_GEN_ALL_CORE
T(C): 36.4 (03-04-23 @ 14:23), Max: 36.4 (03-04-23 @ 14:23)  HR: 63 (03-04-23 @ 14:23) (62 - 63)  BP: 155/75 (03-04-23 @ 14:23) (155/75 - 169/77)  RR: 18 (03-04-23 @ 14:23) (16 - 18)  SpO2: 98% (03-04-23 @ 14:23) (96% - 98%)    CONSTITUTIONAL: elderly, appears fatigues  EYES: arcus senilis, PERRLA and symmetric, EOMI, No conjunctival or scleral injection, non-icteric  ENMT: Oral mucosa with moist membranes. Normal dentition; no pharyngeal injection or exudates  NECK: Supple, symmetric and without tracheal deviation  RESP: poor inspiratory effort, CTA b/l, no WRR, no respiratory distress, no use of accessory muscles;   CV: RRR, +S1S2, no MRG  GI: Soft, NT, ND, no rebound, no guarding; no palpable masses; no hepatosplenomegaly; no hernia palpated  LYMPH: No cervical LAD or tenderness  MSK: hands with RA swan-neck deformities, normal muscle strength  SKIN: No rashes or ulcers noted; no subcutaneous nodules or induration palpable  NEURO: CN II-XII intact; sensation intact in upper and lower extremities b/l to light touch, pt able to recall 1/3 items after 5 minutes, AxO to self only

## 2023-03-04 NOTE — ED ADULT NURSE REASSESSMENT NOTE - NS ED NURSE REASSESS COMMENT FT1
pt evaluated by PT pt too nauseous to get up. as per PT they will come assess patient when she is admitted and on floor. · Magnesium level: 1 5   Replete

## 2023-03-04 NOTE — ED ADULT TRIAGE NOTE - CHIEF COMPLAINT QUOTE
c/o nausea, and chills since last night- as per family patient feels she is at places where she is not

## 2023-03-04 NOTE — PHYSICAL THERAPY INITIAL EVALUATION ADULT - ADDITIONAL COMMENTS
Patient confused, social hx gathered from son at bedside. Patient lives in a senior living center, has HHA 10 hours x 7 days, ambulates with RW, requires assist for all ADLs at baseline.

## 2023-03-04 NOTE — PATIENT PROFILE ADULT - FALL HARM RISK - RISK INTERVENTIONS

## 2023-03-04 NOTE — ED PROVIDER NOTE - CLINICAL SUMMARY MEDICAL DECISION MAKING FREE TEXT BOX
89-year-old female with change in mental status, nausea vomiting, follow-up CT head, CT abdomen and pelvis, send CBC CMP, urinalysis, EKG, chest x-ray, start IV fluids, antiemetics, and reevaluate patient.

## 2023-03-04 NOTE — ED ADULT NURSE NOTE - OBJECTIVE STATEMENT
patient BIBEMS from assisting living with complaints of chills, vomiting , nausea and delirium x 1 day. patient presents with her son, he states " yes, she has history of dementia and has confusion and lucid periods but this is not her normal. patient denies pain but is aroused during painful stimulations. patient states she feels "very tired, cold and not herself." patient is alert and orientated with periods of confusion. skin intact , no respiratory distress present

## 2023-03-04 NOTE — ED PROVIDER NOTE - OBJECTIVE STATEMENT
89-year-old female past medical history of A-fib not currently on anticoagulants, hypothyroid, right total hip in 2016, usually ambulates with a walker unassisted, GERD, hypertension, presents to ER by ambulance, son Clarke at the bedside states that the patient yesterday was having change in mental status, seemed to be confused not knowing where she was and this morning having episode of vomiting and chills and patient unable to get out of bed on her own.  Patient complains of nausea, denies chest pain no abdominal pain.

## 2023-03-04 NOTE — H&P ADULT - PROBLEM SELECTOR PLAN 4
At home on Diltiazem 300mg qd and lasix 20mg qd  - continue home meds  - monitor routine hemodynamics c/w home meds Diltiazem 300mg qd and lasix 20mg qd  - monitor routine hemodynamics

## 2023-03-04 NOTE — H&P ADULT - ATTENDING COMMENTS
89 yo F with PMH of dementia, afib (not on AC), hypothyroidism, R total hip replacement, GERD, HTN, presents to the ED with chills, nausea and vomiting x1 day, admitted for AMS and failure to thrive.    Will start IVF NS, encourage PO intake, plan to start remeron to stimulate appetite, change seroquel to prn as it may be contributing to weakness, and psych consulted/Dr. Cifuentes, will f/u recs.  Zofran prn nausea. Social work eval as pt largely lives on her own, and may not be safe to do so.  Pt has mildly positive UA, will start ceftriaxone, check blader scan, ID consulted, f/u urine cx, and ID recs.    Cedric Javier, Attending Physician 89 yo F with PMH of dementia, afib (not on AC), hypothyroidism, R total hip replacement, GERD, HTN, presents to the ED with chills, nausea and vomiting x1 day, admitted for AMS and failure to thrive.    Will start IVF NS, encourage PO intake, plan to start remeron to stimulate appetite, change seroquel to prn as it may be contributing to weakness, and psych consulted/Dr. Cifuentes, will f/u recs.  Zofran prn nausea. Social work eval as pt largely lives on her own, and may not be safe to do so.  Pt has mildly positive UA, will start ceftriaxone, (clarified with daughter, she says pt did not have any major real allergy to keflex, no anaphylactic reaction or hives), check blader scan, ID consulted, f/u urine cx, and ID recs.    Cedric Javier, Attending Physician

## 2023-03-04 NOTE — H&P ADULT - PROBLEM SELECTOR PLAN 2
Pt with poor PO intake, BUN/Cr 18/1.  - dysphagia screen ordered  - mechanical soft kosher diet if passes dysphagia screen (dysphagia 3 diet)  - NS 60cc x 24hr  - PT and SW consult  - will start remeron 15mg QHS

## 2023-03-04 NOTE — H&P ADULT - NSHPSOCIALHISTORY_GEN_ALL_CORE
Tobacco: denies  EtOH: denies  Recreational drug use: denies   Lives with: alone at [facility]  (independent living facility)  Ambulates: with walker  ADLs: assistance required

## 2023-03-04 NOTE — PHYSICAL THERAPY INITIAL EVALUATION ADULT - NSPTDISCHREC_GEN_A_CORE
home physical therapy vs Subacute Rehab pending completion of functional assessment (limited by nausea)

## 2023-03-04 NOTE — ED ADULT NURSE NOTE - NSIMPLEMENTINTERV_GEN_ALL_ED
Implemented All Fall with Harm Risk Interventions:  [place] to call system. Call bell, personal items and telephone within reach. Instruct patient to call for assistance. Room bathroom lighting operational. Non-slip footwear when patient is off stretcher. Physically safe environment: no spills, clutter or unnecessary equipment. Stretcher in lowest position, wheels locked, appropriate side rails in place. Provide visual cue, wrist band, yellow gown, etc. Monitor gait and stability. Monitor for mental status changes and reorient to person, place, and time. Review medications for side effects contributing to fall risk. Reinforce activity limits and safety measures with patient and family. Provide visual clues: red socks.

## 2023-03-04 NOTE — PATIENT PROFILE ADULT - DO YOU FEEL UNSAFE AT HOME, WORK, OR SCHOOL?
Is This A New Presentation, Or A Follow-Up?: Growths
What Type Of Note Output Would You Prefer (Optional)?: Standard Output
How Severe Is Your Skin Lesion?: moderate
Has Your Skin Lesion Been Treated?: not been treated
no

## 2023-03-05 DIAGNOSIS — N39.0 URINARY TRACT INFECTION, SITE NOT SPECIFIED: ICD-10-CM

## 2023-03-05 LAB
ALBUMIN SERPL ELPH-MCNC: 3.1 G/DL — LOW (ref 3.3–5)
ALP SERPL-CCNC: 53 U/L — SIGNIFICANT CHANGE UP (ref 40–120)
ALT FLD-CCNC: 17 U/L — SIGNIFICANT CHANGE UP (ref 12–78)
ANION GAP SERPL CALC-SCNC: 3 MMOL/L — LOW (ref 5–17)
AST SERPL-CCNC: 12 U/L — LOW (ref 15–37)
BASOPHILS # BLD AUTO: 0.05 K/UL — SIGNIFICANT CHANGE UP (ref 0–0.2)
BASOPHILS NFR BLD AUTO: 0.4 % — SIGNIFICANT CHANGE UP (ref 0–2)
BILIRUB SERPL-MCNC: 0.5 MG/DL — SIGNIFICANT CHANGE UP (ref 0.2–1.2)
BUN SERPL-MCNC: 12 MG/DL — SIGNIFICANT CHANGE UP (ref 7–23)
CALCIUM SERPL-MCNC: 8.3 MG/DL — LOW (ref 8.5–10.1)
CHLORIDE SERPL-SCNC: 107 MMOL/L — SIGNIFICANT CHANGE UP (ref 96–108)
CO2 SERPL-SCNC: 30 MMOL/L — SIGNIFICANT CHANGE UP (ref 22–31)
CREAT SERPL-MCNC: 0.92 MG/DL — SIGNIFICANT CHANGE UP (ref 0.5–1.3)
EGFR: 57 ML/MIN/1.73M2 — LOW
EOSINOPHIL # BLD AUTO: 0.27 K/UL — SIGNIFICANT CHANGE UP (ref 0–0.5)
EOSINOPHIL NFR BLD AUTO: 2.4 % — SIGNIFICANT CHANGE UP (ref 0–6)
GLUCOSE SERPL-MCNC: 107 MG/DL — HIGH (ref 70–99)
HCT VFR BLD CALC: 40.6 % — SIGNIFICANT CHANGE UP (ref 34.5–45)
HGB BLD-MCNC: 13.6 G/DL — SIGNIFICANT CHANGE UP (ref 11.5–15.5)
IMM GRANULOCYTES NFR BLD AUTO: 0.3 % — SIGNIFICANT CHANGE UP (ref 0–0.9)
LYMPHOCYTES # BLD AUTO: 0.78 K/UL — LOW (ref 1–3.3)
LYMPHOCYTES # BLD AUTO: 6.9 % — LOW (ref 13–44)
MCHC RBC-ENTMCNC: 32 PG — SIGNIFICANT CHANGE UP (ref 27–34)
MCHC RBC-ENTMCNC: 33.5 GM/DL — SIGNIFICANT CHANGE UP (ref 32–36)
MCV RBC AUTO: 95.5 FL — SIGNIFICANT CHANGE UP (ref 80–100)
MONOCYTES # BLD AUTO: 0.75 K/UL — SIGNIFICANT CHANGE UP (ref 0–0.9)
MONOCYTES NFR BLD AUTO: 6.6 % — SIGNIFICANT CHANGE UP (ref 2–14)
NEUTROPHILS # BLD AUTO: 9.45 K/UL — HIGH (ref 1.8–7.4)
NEUTROPHILS NFR BLD AUTO: 83.4 % — HIGH (ref 43–77)
NRBC # BLD: 0 /100 WBCS — SIGNIFICANT CHANGE UP (ref 0–0)
PLATELET # BLD AUTO: 203 K/UL — SIGNIFICANT CHANGE UP (ref 150–400)
POTASSIUM SERPL-MCNC: 3.7 MMOL/L — SIGNIFICANT CHANGE UP (ref 3.5–5.3)
POTASSIUM SERPL-SCNC: 3.7 MMOL/L — SIGNIFICANT CHANGE UP (ref 3.5–5.3)
PROT SERPL-MCNC: 7 G/DL — SIGNIFICANT CHANGE UP (ref 6–8.3)
RBC # BLD: 4.25 M/UL — SIGNIFICANT CHANGE UP (ref 3.8–5.2)
RBC # FLD: 13.6 % — SIGNIFICANT CHANGE UP (ref 10.3–14.5)
SODIUM SERPL-SCNC: 140 MMOL/L — SIGNIFICANT CHANGE UP (ref 135–145)
T3 SERPL-MCNC: 54 NG/DL — LOW (ref 80–200)
T4 FREE SERPL-MCNC: 1 NG/DL — SIGNIFICANT CHANGE UP (ref 0.9–1.8)
WBC # BLD: 11.33 K/UL — HIGH (ref 3.8–10.5)
WBC # FLD AUTO: 11.33 K/UL — HIGH (ref 3.8–10.5)

## 2023-03-05 PROCEDURE — 99233 SBSQ HOSP IP/OBS HIGH 50: CPT

## 2023-03-05 RX ADMIN — CEFTRIAXONE 100 MILLIGRAM(S): 500 INJECTION, POWDER, FOR SOLUTION INTRAMUSCULAR; INTRAVENOUS at 16:27

## 2023-03-05 RX ADMIN — Medication 2000 UNIT(S): at 11:26

## 2023-03-05 RX ADMIN — SERTRALINE 150 MILLIGRAM(S): 25 TABLET, FILM COATED ORAL at 11:26

## 2023-03-05 RX ADMIN — PANTOPRAZOLE SODIUM 40 MILLIGRAM(S): 20 TABLET, DELAYED RELEASE ORAL at 05:48

## 2023-03-05 RX ADMIN — Medication 75 MICROGRAM(S): at 05:48

## 2023-03-05 RX ADMIN — MIRTAZAPINE 15 MILLIGRAM(S): 45 TABLET, ORALLY DISINTEGRATING ORAL at 22:18

## 2023-03-05 RX ADMIN — HEPARIN SODIUM 5000 UNIT(S): 5000 INJECTION INTRAVENOUS; SUBCUTANEOUS at 14:09

## 2023-03-05 RX ADMIN — HEPARIN SODIUM 5000 UNIT(S): 5000 INJECTION INTRAVENOUS; SUBCUTANEOUS at 05:49

## 2023-03-05 RX ADMIN — Medication 1 TABLET(S): at 17:28

## 2023-03-05 RX ADMIN — Medication 10 MILLIEQUIVALENT(S): at 11:27

## 2023-03-05 RX ADMIN — Medication 1 TABLET(S): at 05:48

## 2023-03-05 RX ADMIN — Medication 300 MILLIGRAM(S): at 05:49

## 2023-03-05 RX ADMIN — Medication 20 MILLIGRAM(S): at 05:49

## 2023-03-05 RX ADMIN — Medication 1 MILLIGRAM(S): at 11:27

## 2023-03-05 NOTE — CARE COORDINATION ASSESSMENT. - NSCAREPROVIDERS_GEN_ALL_CORE_FT
CARE PROVIDERS:  Accepting Physician: Cedric Javier  Administration: Thiago Rivas  Admitting: Cedric Javier  Attending: Mata Degroot  Consultant: Pardeep Saini  Covering Team: Andrés Duncan  Covering Team: Jaden Escobedo ED Attending: Trell Packer  ED Nurse: Nola Wang  Nurse: Ximena Madrid  Ordered: ADM, User  Override: Ximena Madrid  Override: Corey Carrasco  Override: Ryan Madrid  PCA/Nursing Assistant: Shanti Sampson  PCA/Nursing Assistant: Jennifer Aguirre  Physical Therapy: Melissa Brown  Primary Team: Mata Degroot  Registered Dietitian: Tatiana Romero  Registered Dietitian: Aaliyah Cole  : Brook Gibson

## 2023-03-05 NOTE — PROGRESS NOTE ADULT - NSPROGADDITIONALINFOA_GEN_ALL_CORE
mild elevation troponin with other normal cardiac enzyme, EKG no ischemic change, patient asymptomatic.

## 2023-03-05 NOTE — PROGRESS NOTE ADULT - ASSESSMENT
Patient is a 89 year old female with PMH of dementia, afib (not on AC), hypothyroidism, R total hip replacement, GERD, HTN, presents to the ED with nausea and vomiting x1 day with altered mental status, malaise and fatigue.     AMS with increase urinary frequency with suprapubic fullness  Klebsiella UTI  Constipation  - UA with no pyuria but noted with +nitrites and many bacteria   - CT with no acute pathology, noted constipation, cystic pancreatic lesions, small sludge in gb  - no positive prior cultures noted   - Bcx NGTD x2   - UCx with Klebsiella - f/u sensitivities   - leukocytosis noted today, afebrile, pt clinically feels better   - noted with cephalexin allergy, but patient tolerating ceftriaxone   - continue on ceftriaxone 1g IV Q24h pending above   - monitor temps/WBC    D/w daughter at bedside  Pardeep Saini M.D.  [facility], Division of Infectious Diseases  [phone]  After 5pm on weekdays and all day on weekends - please call [phone]

## 2023-03-05 NOTE — CARE COORDINATION ASSESSMENT. - NSPASTMEDSURGHISTORY_GEN_ALL_CORE_FT
PAST MEDICAL & SURGICAL HISTORY:  OA (osteoarthritis)      Degenerative disc disease, lumbar      Degenerative disc disease, cervical      HTN (hypertension)      Hypothyroidism, adult      GERD (gastroesophageal reflux disease)      Atrial fibrillation      S/P oophorectomy      S/P tonsillectomy      Sarcoma of omentum      Abdominal wall mass  Exp La, Excison of abdominal mass - 8/2015      After cataract not obscuring vision, bilateral  b/l lens implants      Hypothyroidism      GERD (gastroesophageal reflux disease)      Afib

## 2023-03-05 NOTE — CARE COORDINATION ASSESSMENT. - FACILITY LEVEL OF CARE:
Independent Living   Pt has a HHA 10 hours a week x 7 days.   Pt ambulates with a RW and needed assistance with ADLs./independent

## 2023-03-05 NOTE — CARE COORDINATION ASSESSMENT. - OTHER PERTINENT DISCHARGE PLANNING INFORMATION:
Pt resides at [facility] [place]. PATRICIA martini/leandro Barajas Director of Community Relations at the [facility]  to confirm the criteria for pt to return, if any. PATRICIA spoke with pt's son HCP Clarke  who reports pt has hx of dementia. She has a Home Health Aide 10 hours a day for 7 days a week. pt utilize a RW for ambulations and required assistance with ADLs. Family is hopeful to have pt return to her home at the [facility] asa.

## 2023-03-05 NOTE — SOCIAL WORK PROGRESS NOTE - NSSWPROGRESSNOTE_GEN_ALL_CORE
PATRICIA received call back from Jnen at the residency  who reports if pt has any skilled needs then PATRICIA should reach out to Angie  at te Residency at the same number to discuss. At this time pt has a home health aide 10 hours x 7 days a week to assist pt with ADLs and a RW she uses to ambulate. Sw remains available to assist as needed.

## 2023-03-06 LAB
-  AMIKACIN: SIGNIFICANT CHANGE UP
-  AMOXICILLIN/CLAVULANIC ACID: SIGNIFICANT CHANGE UP
-  AMPICILLIN/SULBACTAM: SIGNIFICANT CHANGE UP
-  AMPICILLIN: SIGNIFICANT CHANGE UP
-  AZTREONAM: SIGNIFICANT CHANGE UP
-  CEFAZOLIN: SIGNIFICANT CHANGE UP
-  CEFEPIME: SIGNIFICANT CHANGE UP
-  CEFOXITIN: SIGNIFICANT CHANGE UP
-  CEFTRIAXONE: SIGNIFICANT CHANGE UP
-  CEFUROXIME: SIGNIFICANT CHANGE UP
-  CIPROFLOXACIN: SIGNIFICANT CHANGE UP
-  ERTAPENEM: SIGNIFICANT CHANGE UP
-  GENTAMICIN: SIGNIFICANT CHANGE UP
-  IMIPENEM: SIGNIFICANT CHANGE UP
-  LEVOFLOXACIN: SIGNIFICANT CHANGE UP
-  MEROPENEM: SIGNIFICANT CHANGE UP
-  NITROFURANTOIN: SIGNIFICANT CHANGE UP
-  PIPERACILLIN/TAZOBACTAM: SIGNIFICANT CHANGE UP
-  TOBRAMYCIN: SIGNIFICANT CHANGE UP
-  TRIMETHOPRIM/SULFAMETHOXAZOLE: SIGNIFICANT CHANGE UP
ANION GAP SERPL CALC-SCNC: 5 MMOL/L — SIGNIFICANT CHANGE UP (ref 5–17)
BUN SERPL-MCNC: 15 MG/DL — SIGNIFICANT CHANGE UP (ref 7–23)
CALCIUM SERPL-MCNC: 8.4 MG/DL — LOW (ref 8.5–10.1)
CHLORIDE SERPL-SCNC: 109 MMOL/L — HIGH (ref 96–108)
CO2 SERPL-SCNC: 27 MMOL/L — SIGNIFICANT CHANGE UP (ref 22–31)
CREAT SERPL-MCNC: 0.97 MG/DL — SIGNIFICANT CHANGE UP (ref 0.5–1.3)
CULTURE RESULTS: SIGNIFICANT CHANGE UP
EGFR: 54 ML/MIN/1.73M2 — LOW
GLUCOSE SERPL-MCNC: 88 MG/DL — SIGNIFICANT CHANGE UP (ref 70–99)
HCT VFR BLD CALC: 39.7 % — SIGNIFICANT CHANGE UP (ref 34.5–45)
HGB BLD-MCNC: 13.1 G/DL — SIGNIFICANT CHANGE UP (ref 11.5–15.5)
MCHC RBC-ENTMCNC: 32 PG — SIGNIFICANT CHANGE UP (ref 27–34)
MCHC RBC-ENTMCNC: 33 GM/DL — SIGNIFICANT CHANGE UP (ref 32–36)
MCV RBC AUTO: 96.8 FL — SIGNIFICANT CHANGE UP (ref 80–100)
METHOD TYPE: SIGNIFICANT CHANGE UP
NRBC # BLD: 0 /100 WBCS — SIGNIFICANT CHANGE UP (ref 0–0)
ORGANISM # SPEC MICROSCOPIC CNT: SIGNIFICANT CHANGE UP
ORGANISM # SPEC MICROSCOPIC CNT: SIGNIFICANT CHANGE UP
PLATELET # BLD AUTO: 204 K/UL — SIGNIFICANT CHANGE UP (ref 150–400)
POTASSIUM SERPL-MCNC: 3.6 MMOL/L — SIGNIFICANT CHANGE UP (ref 3.5–5.3)
POTASSIUM SERPL-SCNC: 3.6 MMOL/L — SIGNIFICANT CHANGE UP (ref 3.5–5.3)
RBC # BLD: 4.1 M/UL — SIGNIFICANT CHANGE UP (ref 3.8–5.2)
RBC # FLD: 13.6 % — SIGNIFICANT CHANGE UP (ref 10.3–14.5)
SODIUM SERPL-SCNC: 141 MMOL/L — SIGNIFICANT CHANGE UP (ref 135–145)
SPECIMEN SOURCE: SIGNIFICANT CHANGE UP
WBC # BLD: 8.25 K/UL — SIGNIFICANT CHANGE UP (ref 3.8–10.5)
WBC # FLD AUTO: 8.25 K/UL — SIGNIFICANT CHANGE UP (ref 3.8–10.5)

## 2023-03-06 PROCEDURE — 99233 SBSQ HOSP IP/OBS HIGH 50: CPT

## 2023-03-06 RX ORDER — POTASSIUM CHLORIDE 20 MEQ
40 PACKET (EA) ORAL ONCE
Refills: 0 | Status: COMPLETED | OUTPATIENT
Start: 2023-03-06 | End: 2023-03-06

## 2023-03-06 RX ORDER — CEFTRIAXONE 500 MG/1
1000 INJECTION, POWDER, FOR SOLUTION INTRAMUSCULAR; INTRAVENOUS ONCE
Refills: 0 | Status: COMPLETED | OUTPATIENT
Start: 2023-03-06 | End: 2023-03-06

## 2023-03-06 RX ADMIN — Medication 75 MICROGRAM(S): at 05:58

## 2023-03-06 RX ADMIN — HEPARIN SODIUM 5000 UNIT(S): 5000 INJECTION INTRAVENOUS; SUBCUTANEOUS at 14:42

## 2023-03-06 RX ADMIN — MIRTAZAPINE 15 MILLIGRAM(S): 45 TABLET, ORALLY DISINTEGRATING ORAL at 22:10

## 2023-03-06 RX ADMIN — Medication 1 MILLIGRAM(S): at 11:14

## 2023-03-06 RX ADMIN — CEFTRIAXONE 100 MILLIGRAM(S): 500 INJECTION, POWDER, FOR SOLUTION INTRAMUSCULAR; INTRAVENOUS at 14:42

## 2023-03-06 RX ADMIN — Medication 40 MILLIEQUIVALENT(S): at 22:09

## 2023-03-06 RX ADMIN — Medication 10 MILLIEQUIVALENT(S): at 11:13

## 2023-03-06 RX ADMIN — Medication 20 MILLIGRAM(S): at 05:58

## 2023-03-06 RX ADMIN — Medication 1 TABLET(S): at 05:58

## 2023-03-06 RX ADMIN — Medication 2000 UNIT(S): at 11:13

## 2023-03-06 RX ADMIN — PANTOPRAZOLE SODIUM 40 MILLIGRAM(S): 20 TABLET, DELAYED RELEASE ORAL at 05:58

## 2023-03-06 RX ADMIN — SERTRALINE 150 MILLIGRAM(S): 25 TABLET, FILM COATED ORAL at 11:13

## 2023-03-06 RX ADMIN — Medication 1 TABLET(S): at 17:32

## 2023-03-06 RX ADMIN — HEPARIN SODIUM 5000 UNIT(S): 5000 INJECTION INTRAVENOUS; SUBCUTANEOUS at 22:10

## 2023-03-06 RX ADMIN — HEPARIN SODIUM 5000 UNIT(S): 5000 INJECTION INTRAVENOUS; SUBCUTANEOUS at 05:58

## 2023-03-06 RX ADMIN — Medication 300 MILLIGRAM(S): at 05:58

## 2023-03-06 NOTE — PROGRESS NOTE ADULT - ASSESSMENT
89 yo F with PMH of dementia, afib (not on AC), hypothyroidism, R total hip replacement, GERD, HTN, presents to the ED with chills, nausea and vomiting x1 day, admitted for AMS and failure to thrive.

## 2023-03-06 NOTE — PROGRESS NOTE ADULT - ASSESSMENT
/Patient is a 89 year old female with PMH of dementia, afib (not on AC), hypothyroidism, R total hip replacement, GERD, HTN, presents to the ED with nausea and vomiting x1 day with altered mental status, malaise and fatigue.     AMS with increase urinary frequency with suprapubic fullness/Klebsiella UTI/Constipation  - UA with no pyuria but noted with +nitrites and many bacteria   - CT with no acute pathology, noted constipation, cystic pancreatic lesions, small sludge in gb  - no positive prior cultures noted   - Bcx NGTD x2   - UCx with Klebsiella - >100,000 CFU/ml Klebsiella pneumoniae, relatively pan sensitive,    - noted with cephalexin allergy, but patient tolerating ceftriaxone   - continue on ceftriaxone 1g IV Q24h (started 3/4)-last dose today then fine to finish course with   Keflex 500mg PO QID with last day 3/13    Thank you for consulting us and involving us in the management of this most interesting and challenging case.  We will follow along in the care of this patient. Please call us at [phone] or text me directly on my cell# at [phone] with any concerns.   /Patient is a 89 year old female with PMH of dementia, afib (not on AC), hypothyroidism, R total hip replacement, GERD, HTN, presents to the ED with nausea and vomiting x1 day with altered mental status, malaise and fatigue.     AMS with increase urinary frequency with suprapubic fullness/Klebsiella UTI/Constipation  - UA with no pyuria but noted with +nitrites and many bacteria   - CT with no acute pathology, noted constipation, cystic pancreatic lesions, small sludge in gb  - no positive prior cultures noted   - Bcx NGTD x2   - UCx with Klebsiella - >100,000 CFU/ml Klebsiella pneumoniae, relatively pan sensitive,    - noted with cephalexin allergy, but patient tolerating ceftriaxone   - continue on ceftriaxone 1g IV Q24h (started 3/4)-last dose today then fine to finish course with   Bactrim DS 1 tab PO BID with last day 3/13    Thank you for consulting us and involving us in the management of this most interesting and challenging case.  We will follow along in the care of this patient. Please call us at [phone] or text me directly on my cell# at [phone] with any concerns.

## 2023-03-06 NOTE — CASE MANAGEMENT PROGRESS NOTE - NSCMPROGRESSNOTE_GEN_ALL_CORE
Spoke to Angie from the independent living facility. If the patient needs PT the script can be emailed to [email or web address]. CM will continue to follow.

## 2023-03-07 ENCOUNTER — TRANSCRIPTION ENCOUNTER (OUTPATIENT)
Age: 89
End: 2023-03-07

## 2023-03-07 VITALS
RESPIRATION RATE: 18 BRPM | SYSTOLIC BLOOD PRESSURE: 131 MMHG | DIASTOLIC BLOOD PRESSURE: 69 MMHG | OXYGEN SATURATION: 93 % | HEART RATE: 65 BPM | TEMPERATURE: 99 F

## 2023-03-07 LAB
ANION GAP SERPL CALC-SCNC: 6 MMOL/L — SIGNIFICANT CHANGE UP (ref 5–17)
BUN SERPL-MCNC: 20 MG/DL — SIGNIFICANT CHANGE UP (ref 7–23)
CALCIUM SERPL-MCNC: 8.3 MG/DL — LOW (ref 8.5–10.1)
CHLORIDE SERPL-SCNC: 108 MMOL/L — SIGNIFICANT CHANGE UP (ref 96–108)
CO2 SERPL-SCNC: 26 MMOL/L — SIGNIFICANT CHANGE UP (ref 22–31)
CREAT SERPL-MCNC: 1 MG/DL — SIGNIFICANT CHANGE UP (ref 0.5–1.3)
EGFR: 52 ML/MIN/1.73M2 — LOW
GLUCOSE SERPL-MCNC: 91 MG/DL — SIGNIFICANT CHANGE UP (ref 70–99)
HCT VFR BLD CALC: 39.5 % — SIGNIFICANT CHANGE UP (ref 34.5–45)
HGB BLD-MCNC: 12.9 G/DL — SIGNIFICANT CHANGE UP (ref 11.5–15.5)
MCHC RBC-ENTMCNC: 31.5 PG — SIGNIFICANT CHANGE UP (ref 27–34)
MCHC RBC-ENTMCNC: 32.7 GM/DL — SIGNIFICANT CHANGE UP (ref 32–36)
MCV RBC AUTO: 96.6 FL — SIGNIFICANT CHANGE UP (ref 80–100)
NRBC # BLD: 0 /100 WBCS — SIGNIFICANT CHANGE UP (ref 0–0)
PLATELET # BLD AUTO: 231 K/UL — SIGNIFICANT CHANGE UP (ref 150–400)
POTASSIUM SERPL-MCNC: 3.8 MMOL/L — SIGNIFICANT CHANGE UP (ref 3.5–5.3)
POTASSIUM SERPL-SCNC: 3.8 MMOL/L — SIGNIFICANT CHANGE UP (ref 3.5–5.3)
RBC # BLD: 4.09 M/UL — SIGNIFICANT CHANGE UP (ref 3.8–5.2)
RBC # FLD: 13.4 % — SIGNIFICANT CHANGE UP (ref 10.3–14.5)
SODIUM SERPL-SCNC: 140 MMOL/L — SIGNIFICANT CHANGE UP (ref 135–145)
TSH SERPL-MCNC: 3.07 UIU/ML — SIGNIFICANT CHANGE UP (ref 0.36–3.74)
WBC # BLD: 7.53 K/UL — SIGNIFICANT CHANGE UP (ref 3.8–10.5)
WBC # FLD AUTO: 7.53 K/UL — SIGNIFICANT CHANGE UP (ref 3.8–10.5)

## 2023-03-07 PROCEDURE — 82550 ASSAY OF CK (CPK): CPT

## 2023-03-07 PROCEDURE — 84443 ASSAY THYROID STIM HORMONE: CPT

## 2023-03-07 PROCEDURE — 83735 ASSAY OF MAGNESIUM: CPT

## 2023-03-07 PROCEDURE — 81001 URINALYSIS AUTO W/SCOPE: CPT

## 2023-03-07 PROCEDURE — 36415 COLL VENOUS BLD VENIPUNCTURE: CPT

## 2023-03-07 PROCEDURE — 84439 ASSAY OF FREE THYROXINE: CPT

## 2023-03-07 PROCEDURE — 82553 CREATINE MB FRACTION: CPT

## 2023-03-07 PROCEDURE — 87077 CULTURE AEROBIC IDENTIFY: CPT

## 2023-03-07 PROCEDURE — 70450 CT HEAD/BRAIN W/O DYE: CPT | Mod: MA

## 2023-03-07 PROCEDURE — 96374 THER/PROPH/DIAG INJ IV PUSH: CPT

## 2023-03-07 PROCEDURE — 87086 URINE CULTURE/COLONY COUNT: CPT

## 2023-03-07 PROCEDURE — 93005 ELECTROCARDIOGRAM TRACING: CPT

## 2023-03-07 PROCEDURE — 80053 COMPREHEN METABOLIC PANEL: CPT

## 2023-03-07 PROCEDURE — 96375 TX/PRO/DX INJ NEW DRUG ADDON: CPT

## 2023-03-07 PROCEDURE — 83690 ASSAY OF LIPASE: CPT

## 2023-03-07 PROCEDURE — 83880 ASSAY OF NATRIURETIC PEPTIDE: CPT

## 2023-03-07 PROCEDURE — 97116 GAIT TRAINING THERAPY: CPT

## 2023-03-07 PROCEDURE — 87186 SC STD MICRODIL/AGAR DIL: CPT

## 2023-03-07 PROCEDURE — 85610 PROTHROMBIN TIME: CPT

## 2023-03-07 PROCEDURE — 99285 EMERGENCY DEPT VISIT HI MDM: CPT

## 2023-03-07 PROCEDURE — 99239 HOSP IP/OBS DSCHRG MGMT >30: CPT

## 2023-03-07 PROCEDURE — 87040 BLOOD CULTURE FOR BACTERIA: CPT

## 2023-03-07 PROCEDURE — 97162 PT EVAL MOD COMPLEX 30 MIN: CPT

## 2023-03-07 PROCEDURE — 85730 THROMBOPLASTIN TIME PARTIAL: CPT

## 2023-03-07 PROCEDURE — 85025 COMPLETE CBC W/AUTO DIFF WBC: CPT

## 2023-03-07 PROCEDURE — 71045 X-RAY EXAM CHEST 1 VIEW: CPT

## 2023-03-07 PROCEDURE — 85027 COMPLETE CBC AUTOMATED: CPT

## 2023-03-07 PROCEDURE — 84480 ASSAY TRIIODOTHYRONINE (T3): CPT

## 2023-03-07 PROCEDURE — 83605 ASSAY OF LACTIC ACID: CPT

## 2023-03-07 PROCEDURE — 84484 ASSAY OF TROPONIN QUANT: CPT

## 2023-03-07 PROCEDURE — 87637 SARSCOV2&INF A&B&RSV AMP PRB: CPT

## 2023-03-07 PROCEDURE — 80048 BASIC METABOLIC PNL TOTAL CA: CPT

## 2023-03-07 PROCEDURE — 74176 CT ABD & PELVIS W/O CONTRAST: CPT | Mod: MA

## 2023-03-07 RX ORDER — ACETAMINOPHEN 500 MG
2 TABLET ORAL
Qty: 0 | Refills: 0 | DISCHARGE
Start: 2023-03-07

## 2023-03-07 RX ORDER — SERTRALINE 25 MG/1
1 TABLET, FILM COATED ORAL
Qty: 30 | Refills: 0
Start: 2023-03-07 | End: 2023-04-05

## 2023-03-07 RX ORDER — POLYETHYLENE GLYCOL 3350 17 G/17G
17 POWDER, FOR SOLUTION ORAL
Qty: 1 | Refills: 0
Start: 2023-03-07

## 2023-03-07 RX ORDER — QUETIAPINE FUMARATE 200 MG/1
1 TABLET, FILM COATED ORAL
Qty: 0 | Refills: 0 | DISCHARGE

## 2023-03-07 RX ORDER — POLYETHYLENE GLYCOL 3350 17 G/17G
17 POWDER, FOR SOLUTION ORAL DAILY
Refills: 0 | Status: DISCONTINUED | OUTPATIENT
Start: 2023-03-07 | End: 2023-03-07

## 2023-03-07 RX ORDER — MIRTAZAPINE 45 MG/1
1 TABLET, ORALLY DISINTEGRATING ORAL
Qty: 30 | Refills: 0
Start: 2023-03-07 | End: 2023-04-05

## 2023-03-07 RX ORDER — LACTOBACILLUS ACIDOPHILUS 100MM CELL
1 CAPSULE ORAL
Qty: 14 | Refills: 0
Start: 2023-03-07 | End: 2023-03-13

## 2023-03-07 RX ORDER — SERTRALINE 25 MG/1
1 TABLET, FILM COATED ORAL
Qty: 0 | Refills: 0 | DISCHARGE

## 2023-03-07 RX ADMIN — PANTOPRAZOLE SODIUM 40 MILLIGRAM(S): 20 TABLET, DELAYED RELEASE ORAL at 06:19

## 2023-03-07 RX ADMIN — Medication 1 TABLET(S): at 06:18

## 2023-03-07 RX ADMIN — Medication 75 MICROGRAM(S): at 06:19

## 2023-03-07 RX ADMIN — Medication 20 MILLIGRAM(S): at 06:19

## 2023-03-07 RX ADMIN — POLYETHYLENE GLYCOL 3350 17 GRAM(S): 17 POWDER, FOR SOLUTION ORAL at 11:40

## 2023-03-07 RX ADMIN — SERTRALINE 150 MILLIGRAM(S): 25 TABLET, FILM COATED ORAL at 11:40

## 2023-03-07 RX ADMIN — Medication 2000 UNIT(S): at 11:41

## 2023-03-07 RX ADMIN — Medication 300 MILLIGRAM(S): at 06:19

## 2023-03-07 RX ADMIN — Medication 10 MILLIEQUIVALENT(S): at 11:40

## 2023-03-07 RX ADMIN — Medication 1 MILLIGRAM(S): at 11:41

## 2023-03-07 RX ADMIN — Medication 1 TABLET(S): at 06:19

## 2023-03-07 RX ADMIN — HEPARIN SODIUM 5000 UNIT(S): 5000 INJECTION INTRAVENOUS; SUBCUTANEOUS at 06:19

## 2023-03-07 NOTE — PROGRESS NOTE ADULT - PROBLEM SELECTOR PROBLEM 6
Encounter for deep vein thrombosis (DVT) prophylaxis

## 2023-03-07 NOTE — DISCHARGE NOTE PROVIDER - CARE PROVIDER_API CALL
Goldberg, Steven M (MD)  Cardiovascular Disease; Internal Medicine  [address] [place] [address]  Phone: [phone]  Fax: [phone]  Follow Up Time: 1-3 days

## 2023-03-07 NOTE — PATIENT CHOICE NOTE. - NSPTCHOICESTATE_GEN_ALL_CORE

## 2023-03-07 NOTE — PROGRESS NOTE ADULT - PROBLEM SELECTOR PLAN 3
slight elevation of TSH and normal T4, decreases T3, will keep same home dose synthroid

## 2023-03-07 NOTE — CASE MANAGEMENT PROGRESS NOTE - NSCMPROGRESSNOTE_GEN_ALL_CORE
Spoke to Clarke caceres DC time at 130pm he will pick-up. I left a message with Angie from Residency

## 2023-03-07 NOTE — PROGRESS NOTE ADULT - PROBLEM SELECTOR PLAN 2
likely metabolic encephalopathy due to UTI with underlying dementia   - c/w home sertraline, remeron  - psych consult appreciated   - d/c seroquel
likely metabolic encephalopathy due to UTI with underlying dementia   - c/w home sertraline, remeron  - psych consult, Dr. Cifuentes   - d/c seroantonettel
likely metabolic encephalopathy due to UTI with underlying dementia   - c/w home sertraline, remeron  - psych consult, Dr. Cifuentes  - d/c seroantonettel

## 2023-03-07 NOTE — PROGRESS NOTE ADULT - ASSESSMENT
/Patient is a 89 year old female with PMH of dementia, afib (not on AC), hypothyroidism, R total hip replacement, GERD, HTN, presents to the ED with nausea and vomiting x1 day with altered mental status, malaise and fatigue.     AMS with increase urinary frequency with suprapubic fullness/Klebsiella UTI/Constipation  - UA with no pyuria but noted with +nitrites and many bacteria   - CT with no acute pathology, noted constipation, cystic pancreatic lesions, small sludge in gb  - no positive prior cultures noted   - Bcx NGTD x2   - UCx with Klebsiella - >100,000 CFU/ml Klebsiella pneumoniae, relatively pan sensitive,    - noted with cephalexin allergy, but patient tolerating ceftriaxone   - sp ceftriaxone 1g IV Q24h (started 3/4)- fine to finish course with   Bactrim DS 1 tab PO BID with last day 3/13    Thank you for consulting us and involving us in the management of this most interesting and challenging case.  We will follow along in the care of this patient. Please call us at [phone] or text me directly on my cell# at [phone] with any concerns.

## 2023-03-07 NOTE — PROGRESS NOTE ADULT - REASON FOR ADMISSION
Nausea/Vomiting, weakness

## 2023-03-07 NOTE — CASE MANAGEMENT PROGRESS NOTE - NSCMPROGRESSNOTE_GEN_ALL_CORE
Spoke to Clarke MEYERS regarding the patient DC plan. The patient is medically stable for DC. The patient is confused and has a hx of dementia. I explained as well as the MD that the patient is confused at this time it is not safe for her to be alone. The family said they can provide 24 hour coverage along with the 10 hours of HHA. The patients Son Clarke will call me back to arrange transport or plan for pick-up. CM will continue to monitor.

## 2023-03-07 NOTE — DISCHARGE NOTE PROVIDER - HOSPITAL COURSE
89 yo F with PMH of dementia, afib (not on AC), hypothyroidism, R total hip replacement, GERD, HTN, presents to the ED with chills, nausea and vomiting x1 day, admitted for AMS and failure to thrive.     Problem/Plan - 1:  ·  Problem: Urinary tract infection.   ·  Plan: on Rocephin->bactrim oral on discharge  urine culture + K pneumoniae   leukocytosis normalized   PT re-eval- home PT   discussed with PCP Dr Goldberg on the phone      Problem/Plan - 2:  ·  Problem: AMS (altered mental status).   ·  Plan: likely metabolic encephalopathy due to UTI with underlying dementia   - c/w home sertraline, remeron  - psych consult appreciated   - d/c seroquel.     Problem/Plan - 3:  ·  Problem: Hypothyroidism.   ·  Plan: slight elevation of TSH and normal T4, decreases T3, will keep same home dose synthroid.     Problem/Plan - 4:  ·  Problem: HTN (hypertension).   ·  Plan: c/w home meds Diltiazem 300mg qd and lasix 20mg qd  - monitor routine hemodynamics.     Problem/Plan - 5:  ·  Problem: GERD (gastroesophageal reflux disease).   ·  Plan: c/w home pantoprazole.    constipation: miralax No

## 2023-03-07 NOTE — DISCHARGE NOTE NURSING/CASE MANAGEMENT/SOCIAL WORK - PATIENT PORTAL LINK FT
You can access the FollowMyHealth Patient Portal offered by [facility] by registering at the following website: [email or web address]. By joining [facility]’s FollowMyHealth portal, you will also be able to view your health information using other applications (apps) compatible with our system.

## 2023-03-07 NOTE — PROGRESS NOTE ADULT - TIME BILLING
direct patient care including but not limited to reviewing chart, medications ,laboratory data, imaging reports, discussion of plan of care with consultants on the case, coordination of care with multidisciplinary team involved in the case and discussion of plan with patient.  Patient and family agreeable to plan of care and verbalized understanding the anticipated hospital course and treatment plan.

## 2023-03-07 NOTE — PROGRESS NOTE ADULT - SUBJECTIVE AND OBJECTIVE BOX
[facility]  Kamran Meadows MD PhD, Sammi Elizabeth MD, Sandra Lind MD, Pardeep Saini MD, Matthieu Spicer MD  and providing coverage with Micky Burch MD  Providing Infectious Disease Consultations at [facility], [facility], [facility]'s    Office# [phone] to schedule follow up appointments  Answering Service for urgent calls or New Consults [phone]  Cell# to text for urgent issues Kamran Meadows [phone]     infectious diseases progress note:    TYRON FORTUNE is a 89y y. o. Female patient    Overnight and events of the last 24hrs reviewed    Allergies    Keflex (Other)    Intolerances        ANTIBIOTICS/RELEVANT:  antimicrobials  trimethoprim  160 mG/sulfamethoxazole 800 mG 1 Tablet(s) Oral two times a day    immunologic:    OTHER:  acetaminophen     Tablet .. 650 milliGRAM(s) Oral every 6 hours PRN  aluminum hydroxide/magnesium hydroxide/simethicone Suspension 30 milliLiter(s) Oral every 4 hours PRN  cholecalciferol 2000 Unit(s) Oral daily  diltiazem    milliGRAM(s) Oral daily  folic acid 1 milliGRAM(s) Oral daily  furosemide    Tablet 20 milliGRAM(s) Oral daily  heparin   Injectable 5000 Unit(s) SubCutaneous every 8 hours  lactobacillus acidophilus 1 Tablet(s) Oral every 12 hours  levothyroxine 75 MICROGram(s) Oral daily  melatonin 3 milliGRAM(s) Oral at bedtime PRN  mirtazapine 15 milliGRAM(s) Oral at bedtime  ondansetron Injectable 4 milliGRAM(s) IV Push every 4 hours PRN  pantoprazole    Tablet 40 milliGRAM(s) Oral before breakfast  polyethylene glycol 3350 17 Gram(s) Oral daily  potassium chloride    Tablet ER 10 milliEquivalent(s) Oral daily  sertraline 150 milliGRAM(s) Oral daily      Objective:  Vital Signs Last 24 Hrs  T(C): 36.2 (07 Mar 2023 05:40), Max: 36.7 (06 Mar 2023 11:35)  T(F): 97.2 (07 Mar 2023 05:40), Max: 98.1 (06 Mar 2023 11:35)  HR: 72 (07 Mar 2023 05:40) (69 - 97)  BP: 166/81 (07 Mar 2023 05:40) (100/61 - 166/81)  BP(mean): --  RR: 18 (07 Mar 2023 05:40) (18 - 18)  SpO2: 90% (07 Mar 2023 05:40) (90% - 98%)    Parameters below as of 07 Mar 2023 05:40  Patient On (Oxygen Delivery Method): room air        T(C): 36.2 (03-07-23 @ 05:40), Max: 37.1 (03-05-23 @ 19:21)  T(C): 36.2 (03-07-23 @ 05:40), Max: 37.1 (03-05-23 @ 19:21)  T(C): 36.2 (03-07-23 @ 05:40), Max: 37.1 (03-05-23 @ 19:21)    PHYSICAL EXAM:  HEENT: NC atraumatic  Neck: supple  Respiratory: no accessory muscle use, breathing comfortably  Cardiovascular: distant  Gastrointestinal: normal appearing, nondistended  Extremities: no clubbing, no cyanosis,        LABS:                          12.9   7.53  )-----------( 231      ( 07 Mar 2023 06:25 )             39.5       WBC  7.53 03-07 @ 06:25  8.25 03-06 @ 06:18  11.33 03-05 @ 06:10  8.03 03-04 @ 08:55      03-07    140  |  108  |  20  ----------------------------<  91  3.8   |  26  |  1.00    Ca    8.3<L>      07 Mar 2023 06:25        Creatinine, Serum: 1.00 mg/dL (03-07-23 @ 06:25)  Creatinine, Serum: 0.97 mg/dL (03-06-23 @ 06:18)  Creatinine, Serum: 0.92 mg/dL (03-05-23 @ 06:10)  Creatinine, Serum: 1.00 mg/dL (03-04-23 @ 08:55)                INFLAMMATORY MARKERS      MICROBIOLOGY:              RADIOLOGY & ADDITIONAL STUDIES:  
[facility]  STEFFANIE Spivey Y. Patel, S. Shah, G. Nayan  [phone]  ([phone] - weekdays after 5pm and weekends)    Name: TYRON FORTUNE  Age/Gender: 89y Female  MRN: [id number]    Interval History:  Patient seen and examined.  Feels better, no new complaints.   Notes reviewed  No concerning overnight events  Afebrile. Daughter at bedside  Allergies: Keflex (Other)      Objective:  Vitals:   T(F): 98.7 (23 @ 19:21), Max: 98.7 (23 @ 19:21)  HR: 65 (23 @ 19:21) (65 - 75)  BP: 115/67 (23 @ 19:21) (101/63 - 135/72)  RR: 18 (23 @ 19:21) (18 - 18)  SpO2: 91% (23 @ 19:21) (91% - 92%)  Physical Examination:  General: no acute distress  HEENT: NC/AT, anicteric, neck supple  Respiratory: clear to auscultation bilaterally  Cardiovascular: S1 and S2 present, normal rate   Gastrointestinal: soft, nontender, nondistended  Extremities: no edema, no cyanosis  Skin: no visible rash    Laboratory Studies:  CBC:                       13.6   11.33 )-----------(       ( 05 Mar 2023 06:10 )             40.6     WBC Trend:  11.33 23 @ 06:10  8.03 23 @ 08:55    CMP:     140  |  107  |  12  ----------------------------<  107<H>  3.7   |  30  |  0.92    Ca    8.3<L>      05 Mar 2023 06:10  Mg     2.1         TPro  7.0  /  Alb  3.1<L>  /  TBili  0.5  /  DBili  x   /  AST  12<L>  /  ALT  17  /  AlkPhos  53      Creatinine, Serum: 0.92 mg/dL (23 @ 06:10)  Creatinine, Serum: 1.00 mg/dL (23 @ 08:55)      LIVER FUNCTIONS - ( 05 Mar 2023 06:10 )  Alb: 3.1 g/dL / Pro: 7.0 g/dL / ALK PHOS: 53 U/L / ALT: 17 U/L / AST: 12 U/L / GGT: x           Urinalysis Basic - ( 04 Mar 2023 11:35 )  Color: Pale Yellow / Appearance: Slightly Turbid / S.005 / pH: x  Gluc: x / Ketone: Negative  / Bili: Negative / Urobili: Negative   Blood: x / Protein: 30 mg/dL / Nitrite: Positive   Leuk Esterase: Negative / RBC: 0-2 /HPF / WBC 0-2   Sq Epi: x / Non Sq Epi: Occasional / Bacteria: Many    Microbiology: reviewed   Culture - Urine (collected 23 @ 11:35)  Source: Catheterized Catheterized  Preliminary Report (23 @ 16:11):    >100,000 CFU/ml Klebsiella pneumoniae    Culture - Blood (collected 23 @ 09:00)  Source: .Blood Blood-Peripheral  Preliminary Report (23 @ 16:01):    No growth to date.    Culture - Blood (collected 23 @ 08:55)  Source: .Blood Blood-Peripheral  Preliminary Report (23 @ 16:01):    No growth to date.    SARS-CoV-2 Result: NotDetec (04 Mar 2023 08:55)    Radiology: reviewed     Medications:  acetaminophen     Tablet .. 650 milliGRAM(s) Oral every 6 hours PRN  aluminum hydroxide/magnesium hydroxide/simethicone Suspension 30 milliLiter(s) Oral every 4 hours PRN  cefTRIAXone   IVPB 1000 milliGRAM(s) IV Intermittent every 24 hours  cholecalciferol 2000 Unit(s) Oral daily  diltiazem    milliGRAM(s) Oral daily  folic acid 1 milliGRAM(s) Oral daily  furosemide    Tablet 20 milliGRAM(s) Oral daily  heparin   Injectable 5000 Unit(s) SubCutaneous every 8 hours  lactobacillus acidophilus 1 Tablet(s) Oral every 12 hours  levothyroxine 75 MICROGram(s) Oral daily  melatonin 3 milliGRAM(s) Oral at bedtime PRN  mirtazapine 15 milliGRAM(s) Oral at bedtime  ondansetron Injectable 4 milliGRAM(s) IV Push every 4 hours PRN  pantoprazole    Tablet 40 milliGRAM(s) Oral before breakfast  potassium chloride    Tablet ER 10 milliEquivalent(s) Oral daily  sertraline 150 milliGRAM(s) Oral daily  sodium chloride 0.9%. 1000 milliLiter(s) IV Continuous <Continuous>    Current Antimicrobials:  cefTRIAXone   IVPB 1000 milliGRAM(s) IV Intermittent every 24 hours    Prior/Completed Antimicrobials:  
[facility]  Kamran Meadows MD PhD, Sammi Elizabeth MD, Sandra Lind MD, Pardeep Saini MD, Matthieu Spicer MD  and providing coverage with Micky Burch MD  Providing Infectious Disease Consultations at [facility], [facility], [place], [place], [facility], [facility]'s    Office# [phone] to schedule follow up appointments  Answering Service for urgent calls or New Consults [phone]  Cell# to text for urgent issues Kamran Meadows [phone]     infectious diseases progress note:    TYRON FORTUNE is a 89y y. o. Female patient    Overnight and events of the last 24hrs reviewed    Allergies    Keflex (Other)    Intolerances        ANTIBIOTICS/RELEVANT:  antimicrobials  cefTRIAXone   IVPB 1000 milliGRAM(s) IV Intermittent every 24 hours    immunologic:    OTHER:  acetaminophen     Tablet .. 650 milliGRAM(s) Oral every 6 hours PRN  aluminum hydroxide/magnesium hydroxide/simethicone Suspension 30 milliLiter(s) Oral every 4 hours PRN  cholecalciferol 2000 Unit(s) Oral daily  diltiazem    milliGRAM(s) Oral daily  folic acid 1 milliGRAM(s) Oral daily  furosemide    Tablet 20 milliGRAM(s) Oral daily  heparin   Injectable 5000 Unit(s) SubCutaneous every 8 hours  lactobacillus acidophilus 1 Tablet(s) Oral every 12 hours  levothyroxine 75 MICROGram(s) Oral daily  melatonin 3 milliGRAM(s) Oral at bedtime PRN  mirtazapine 15 milliGRAM(s) Oral at bedtime  ondansetron Injectable 4 milliGRAM(s) IV Push every 4 hours PRN  pantoprazole    Tablet 40 milliGRAM(s) Oral before breakfast  potassium chloride    Tablet ER 10 milliEquivalent(s) Oral daily  sertraline 150 milliGRAM(s) Oral daily  sodium chloride 0.9%. 1000 milliLiter(s) IV Continuous <Continuous>      Objective:  Vital Signs Last 24 Hrs  T(C): 36.7 (06 Mar 2023 11:35), Max: 37.1 (05 Mar 2023 19:21)  T(F): 98.1 (06 Mar 2023 11:35), Max: 98.7 (05 Mar 2023 19:21)  HR: 97 (06 Mar 2023 11:35) (65 - 97)  BP: 113/63 (06 Mar 2023 11:35) (113/63 - 149/67)  BP(mean): --  RR: 18 (06 Mar 2023 11:35) (18 - 18)  SpO2: 98% (06 Mar 2023 11:35) (91% - 98%)    Parameters below as of 06 Mar 2023 11:35  Patient On (Oxygen Delivery Method): room air        T(C): 36.7 (03-06-23 @ 11:35), Max: 37.1 (03-05-23 @ 19:21)  T(C): 36.7 (03-06-23 @ 11:35), Max: 37.1 (03-05-23 @ 19:21)  T(C): 36.7 (03-06-23 @ 11:35), Max: 37.1 (03-05-23 @ 19:21)    PHYSICAL EXAM:  HEENT: NC atraumatic  Neck: supple  Respiratory: no accessory muscle use, breathing comfortably  Cardiovascular: distant  Gastrointestinal: normal appearing, nondistended  Extremities: no clubbing, no cyanosis,        LABS:                          13.1   8.25  )-----------( 204      ( 06 Mar 2023 06:18 )             39.7       WBC  8.25 03-06 @ 06:18  11.33 03-05 @ 06:10  8.03 03-04 @ 08:55      03-06    141  |  109<H>  |  15  ----------------------------<  88  3.6   |  27  |  0.97    Ca    8.4<L>      06 Mar 2023 06:18    TPro  7.0  /  Alb  3.1<L>  /  TBili  0.5  /  DBili  x   /  AST  12<L>  /  ALT  17  /  AlkPhos  53  03-05      Creatinine, Serum: 0.97 mg/dL (03-06-23 @ 06:18)  Creatinine, Serum: 0.92 mg/dL (03-05-23 @ 06:10)  Creatinine, Serum: 1.00 mg/dL (03-04-23 @ 08:55)      INFLAMMATORY MARKERS      MICROBIOLOGY:    Culture - Urine (03.04.23 @ 11:35)    -  Amikacin: S <=16    -  Amoxicillin/Clavulanic Acid: S <=8/4    -  Ampicillin: R <=8 These ampicillin results predict results for amoxicillin    -  Ampicillin/Sulbactam: S <=4/2 Enterobacter, Klebsiella aerogenes, Citrobacter, and Serratia may develop resistance during prolonged therapy (3-4 days)    -  Aztreonam: S <=4    -  Cefazolin: S <=2 For uncomplicated UTI with K. pneumoniae, E. coli, or P. mirablis: PARRIS <=16 is sensitive and PARRIS >=32 is resistant. This also predicts results for oral agents cefaclor, cefdinir, cefpodoxime, cefprozil, cefuroxime axetil, cephalexin and locarbef for uncomplicated UTI. Note that some isolates may be susceptible to these agents while testing resistant to cefazolin.    -  Cefepime: S <=2    -  Cefoxitin: S <=8    -  Ceftriaxone: S <=1 Enterobacter, Klebsiella aerogenes, Citrobacter, and Serratia may develop resistance during prolonged therapy    -  Cefuroxime: S <=4    -  Ciprofloxacin: I 0.5    -  Ertapenem: S <=0.5    -  Gentamicin: S <=2    -  Imipenem: S <=1    -  Levofloxacin: S <=0.5    -  Meropenem: S <=1    -  Nitrofurantoin: I 64 Should not be used to treat pyelonephritis    -  Piperacillin/Tazobactam: S <=8    -  Tobramycin: S <=2    -  Trimethoprim/Sulfamethoxazole: R >2/38    Specimen Source: Catheterized Catheterized    Culture Results:   >100,000 CFU/ml Klebsiella pneumoniae    Organism Identification: Klebsiella pneumoniae    Organism: Klebsiella pneumoniae    Method Type: PARRIS        RADIOLOGY & ADDITIONAL STUDIES:  
Patient is a 89y old  Female who presents with a chief complaint of Nausea/Vomiting, weakness (06 Mar 2023 13:33)      Subjective:  INTERVAL HPI/OVERNIGHT EVENTS: Patient seen and examined at bedside.  Patient has no complaints at this time.   MEDICATIONS  (STANDING):  cholecalciferol 2000 Unit(s) Oral daily  diltiazem    milliGRAM(s) Oral daily  folic acid 1 milliGRAM(s) Oral daily  furosemide    Tablet 20 milliGRAM(s) Oral daily  heparin   Injectable 5000 Unit(s) SubCutaneous every 8 hours  lactobacillus acidophilus 1 Tablet(s) Oral every 12 hours  levothyroxine 75 MICROGram(s) Oral daily  mirtazapine 15 milliGRAM(s) Oral at bedtime  pantoprazole    Tablet 40 milliGRAM(s) Oral before breakfast  potassium chloride    Tablet ER 10 milliEquivalent(s) Oral daily  sertraline 150 milliGRAM(s) Oral daily  sodium chloride 0.9%. 1000 milliLiter(s) (60 mL/Hr) IV Continuous <Continuous>    MEDICATIONS  (PRN):  acetaminophen     Tablet .. 650 milliGRAM(s) Oral every 6 hours PRN Temp greater or equal to 38C (100.4F), Mild Pain (1 - 3)  aluminum hydroxide/magnesium hydroxide/simethicone Suspension 30 milliLiter(s) Oral every 4 hours PRN Dyspepsia  melatonin 3 milliGRAM(s) Oral at bedtime PRN Insomnia  ondansetron Injectable 4 milliGRAM(s) IV Push every 4 hours PRN Nausea and/or Vomiting      Allergies    Keflex (Other)    Intolerances        REVIEW OF SYSTEMS:  CONSTITUTIONAL: No fever or chills  HEENT:  No headache, no sore throat  RESPIRATORY: No cough or shortness of breath  CARDIOVASCULAR: No chest pain or palpitations  GASTROINTESTINAL: No abd pain, nausea, vomiting, or diarrhea      Objective:  Vital Signs Last 24 Hrs  T(C): 36.7 (06 Mar 2023 11:35), Max: 37.1 (05 Mar 2023 19:21)  T(F): 98.1 (06 Mar 2023 11:35), Max: 98.7 (05 Mar 2023 19:21)  HR: 97 (06 Mar 2023 11:35) (65 - 97)  BP: 113/63 (06 Mar 2023 11:35) (113/63 - 149/67)  BP(mean): --  RR: 18 (06 Mar 2023 11:35) (18 - 18)  SpO2: 98% (06 Mar 2023 11:35) (91% - 98%)    Parameters below as of 06 Mar 2023 11:35  Patient On (Oxygen Delivery Method): room air        GENERAL: NAD, lying in bed comfortably  HEAD:  Normocephalic  EYES:  conjunctiva and sclera clear  ENT: Moist mucous membranes  NECK: Supple  CHEST/LUNG: Clear to auscultation bilaterally; No rales or rhonchi; no wheezing. Unlabored respirations  HEART: Regular rate and rhythm; S1S2+  ABDOMEN: Bowel sounds present; Soft, Nontender, Nondistended.   EXTREMITIES:  + distal Peripheral Pulses;  No cyanosis, or edema  NERVOUS SYSTEM:  Alert & Oriented X3;  No gross focal deficits   MSK: moves all extremities  SKIN: No rashes     LABS:                        13.1   8.25  )-----------( 204      ( 06 Mar 2023 06:18 )             39.7     06 Mar 2023 06:18    141    |  109    |  15     ----------------------------<  88     3.6     |  27     |  0.97     Ca    8.4        06 Mar 2023 06:18          CAPILLARY BLOOD GLUCOSE            Culture - Urine (collected 03-04-23 @ 11:35)  Source: Catheterized Catheterized  Final Report (03-06-23 @ 12:36):    >100,000 CFU/ml Klebsiella pneumoniae  Organism: Klebsiella pneumoniae (03-06-23 @ 12:36)  Organism: Klebsiella pneumoniae (03-06-23 @ 12:36)      -  Amikacin: S <=16      -  Amoxicillin/Clavulanic Acid: S <=8/4      -  Ampicillin: R <=8 These ampicillin results predict results for amoxicillin      -  Ampicillin/Sulbactam: S <=4/2 Enterobacter, Klebsiella aerogenes, Citrobacter, and Serratia may develop resistance during prolonged therapy (3-4 days)      -  Aztreonam: S <=4      -  Cefazolin: S <=2 For uncomplicated UTI with K. pneumoniae, E. coli, or P. mirablis: PARRIS <=16 is sensitive and PARRIS >=32 is resistant. This also predicts results for oral agents cefaclor, cefdinir, cefpodoxime, cefprozil, cefuroxime axetil, cephalexin and locarbef for uncomplicated UTI. Note that some isolates may be susceptible to these agents while testing resistant to cefazolin.      -  Cefepime: S <=2      -  Cefoxitin: S <=8      -  Ceftriaxone: S <=1 Enterobacter, Klebsiella aerogenes, Citrobacter, and Serratia may develop resistance during prolonged therapy      -  Cefuroxime: S <=4      -  Ciprofloxacin: I 0.5      -  Ertapenem: S <=0.5      -  Gentamicin: S <=2      -  Imipenem: S <=1      -  Levofloxacin: S <=0.5      -  Meropenem: S <=1      -  Nitrofurantoin: I 64 Should not be used to treat pyelonephritis      -  Piperacillin/Tazobactam: S <=8      -  Tobramycin: S <=2      -  Trimethoprim/Sulfamethoxazole: R >2/38      Method Type: PARRIS    Culture - Blood (collected 03-04-23 @ 09:00)  Source: .Blood Blood-Peripheral  Preliminary Report (03-05-23 @ 16:01):    No growth to date.    Culture - Blood (collected 03-04-23 @ 08:55)  Source: .Blood Blood-Peripheral  Preliminary Report (03-05-23 @ 16:01):    No growth to date.        RADIOLOGY & ADDITIONAL TESTS:    Personally reviewed.     Consultant(s) Notes Reviewed:  [x] YES  [ ] NO    Plan of care discussed with patient /family; all questions answered  
Patient is a 89y old  Female who presents with a chief complaint of Nausea/Vomiting, weakness (04 Mar 2023 17:03)      Subjective:  INTERVAL HPI/OVERNIGHT EVENTS: Patient seen and examined at bedside.  Patient has no complaints at this time but  poor historian, not able to tell why she is in the hospital  MEDICATIONS  (STANDING):  cefTRIAXone   IVPB 1000 milliGRAM(s) IV Intermittent every 24 hours  cholecalciferol 2000 Unit(s) Oral daily  diltiazem    milliGRAM(s) Oral daily  folic acid 1 milliGRAM(s) Oral daily  furosemide    Tablet 20 milliGRAM(s) Oral daily  heparin   Injectable 5000 Unit(s) SubCutaneous every 8 hours  lactobacillus acidophilus 1 Tablet(s) Oral every 12 hours  levothyroxine 75 MICROGram(s) Oral daily  mirtazapine 15 milliGRAM(s) Oral at bedtime  pantoprazole    Tablet 40 milliGRAM(s) Oral before breakfast  potassium chloride    Tablet ER 10 milliEquivalent(s) Oral daily  sertraline 150 milliGRAM(s) Oral daily  sodium chloride 0.9%. 1000 milliLiter(s) (60 mL/Hr) IV Continuous <Continuous>    MEDICATIONS  (PRN):  acetaminophen     Tablet .. 650 milliGRAM(s) Oral every 6 hours PRN Temp greater or equal to 38C (100.4F), Mild Pain (1 - 3)  aluminum hydroxide/magnesium hydroxide/simethicone Suspension 30 milliLiter(s) Oral every 4 hours PRN Dyspepsia  melatonin 3 milliGRAM(s) Oral at bedtime PRN Insomnia  ondansetron Injectable 4 milliGRAM(s) IV Push every 4 hours PRN Nausea and/or Vomiting  QUEtiapine 25 milliGRAM(s) Oral at bedtime PRN agitation      Allergies    Keflex (Other)    Intolerances        REVIEW OF SYSTEMS:  CONSTITUTIONAL: No fever or chills  HEENT:  No headache, no sore throat  RESPIRATORY: No cough or shortness of breath  CARDIOVASCULAR: No chest pain or palpitations  GASTROINTESTINAL: No abd pain, nausea, vomiting, or diarrhea      Objective:  Vital Signs Last 24 Hrs  T(C): 36.6 (05 Mar 2023 12:31), Max: 36.7 (04 Mar 2023 18:38)  T(F): 97.9 (05 Mar 2023 12:31), Max: 98 (04 Mar 2023 18:38)  HR: 75 (05 Mar 2023 12:31) (63 - 75)  BP: 101/63 (05 Mar 2023 12:31) (101/63 - 155/75)  BP(mean): --  RR: 18 (05 Mar 2023 12:31) (18 - 18)  SpO2: 92% (05 Mar 2023 12:31) (92% - 98%)    Parameters below as of 05 Mar 2023 12:31  Patient On (Oxygen Delivery Method): room air        GENERAL: NAD, sitting in bed   HEAD:  Normocephalic  EYES:  conjunctiva and sclera clear  ENT: Moist mucous membranes  NECK: Supple  CHEST/LUNG: Clear to auscultation bilaterally; No rales or rhonchi; no wheezing. Unlabored respirations  HEART: Regular rate and rhythm; S1S2+  ABDOMEN: Bowel sounds present; Soft, Nontender, Nondistended.   EXTREMITIES:  + distal Peripheral Pulses;  No cyanosis, or edema  NERVOUS SYSTEM:  Alert & Oriented X1;  No gross focal deficits , + memory loss   MSK: moves all extremities  SKIN: No rashes     LABS:                        13.6   11.33 )-----------( 203      ( 05 Mar 2023 06:10 )             40.6     05 Mar 2023 06:10    140    |  107    |  12     ----------------------------<  107    3.7     |  30     |  0.92     Ca    8.3        05 Mar 2023 06:10    TPro  7.0    /  Alb  3.1    /  TBili  0.5    /  DBili  x      /  AST  12     /  ALT  17     /  AlkPhos  53     05 Mar 2023 06:10    PT/INR - ( 04 Mar 2023 08:55 )   PT: 12.5 sec;   INR: 1.07 ratio         PTT - ( 04 Mar 2023 08:55 )  PTT:26.4 sec  Urinalysis Basic - ( 04 Mar 2023 11:35 )    Color: Pale Yellow / Appearance: Slightly Turbid / S.005 / pH: x  Gluc: x / Ketone: Negative  / Bili: Negative / Urobili: Negative   Blood: x / Protein: 30 mg/dL / Nitrite: Positive   Leuk Esterase: Negative / RBC: 0-2 /HPF / WBC 0-2   Sq Epi: x / Non Sq Epi: Occasional / Bacteria: Many      CAPILLARY BLOOD GLUCOSE            Culture - Urine (collected 23 @ 11:35)  Source: Catheterized Catheterized  Preliminary Report (23 @ 11:30):    >100,000 CFU/ml Gram Negative Rods    xray: < from: Xray Chest 1 View- PORTABLE-Urgent (Xray Chest 1 View- PORTABLE-Urgent .) (23 @ 12:39) >  Findings/  Impression:The heart is unremarkable. The lungs are clear. No acute   osseous abnormality.    Consultant(s) Notes Reviewed:  [x] YES  [ ] NO    Plan of care discussed with patient /family; all questions answered  
Patient is a 89y old  Female who presents with a chief complaint of Nausea/Vomiting, weakness (06 Mar 2023 15:32)      Subjective:  INTERVAL HPI/OVERNIGHT EVENTS: Patient seen and examined at bedside.  Patient has no complaints at this time.  MEDICATIONS  (STANDING):  cholecalciferol 2000 Unit(s) Oral daily  diltiazem    milliGRAM(s) Oral daily  folic acid 1 milliGRAM(s) Oral daily  furosemide    Tablet 20 milliGRAM(s) Oral daily  heparin   Injectable 5000 Unit(s) SubCutaneous every 8 hours  lactobacillus acidophilus 1 Tablet(s) Oral every 12 hours  levothyroxine 75 MICROGram(s) Oral daily  mirtazapine 15 milliGRAM(s) Oral at bedtime  pantoprazole    Tablet 40 milliGRAM(s) Oral before breakfast  potassium chloride    Tablet ER 10 milliEquivalent(s) Oral daily  sertraline 150 milliGRAM(s) Oral daily  trimethoprim  160 mG/sulfamethoxazole 800 mG 1 Tablet(s) Oral two times a day    MEDICATIONS  (PRN):  acetaminophen     Tablet .. 650 milliGRAM(s) Oral every 6 hours PRN Temp greater or equal to 38C (100.4F), Mild Pain (1 - 3)  aluminum hydroxide/magnesium hydroxide/simethicone Suspension 30 milliLiter(s) Oral every 4 hours PRN Dyspepsia  melatonin 3 milliGRAM(s) Oral at bedtime PRN Insomnia  ondansetron Injectable 4 milliGRAM(s) IV Push every 4 hours PRN Nausea and/or Vomiting      Allergies    Keflex (Other)    Intolerances        REVIEW OF SYSTEMS:  CONSTITUTIONAL: No fever or chills  HEENT:  No headache, no sore throat  RESPIRATORY: No cough or shortness of breath  CARDIOVASCULAR: No chest pain or palpitations  GASTROINTESTINAL: No abd pain, nausea, vomiting, or diarrhea      Objective:  Vital Signs Last 24 Hrs  T(C): 36.2 (07 Mar 2023 05:40), Max: 36.7 (06 Mar 2023 11:35)  T(F): 97.2 (07 Mar 2023 05:40), Max: 98.1 (06 Mar 2023 11:35)  HR: 72 (07 Mar 2023 05:40) (69 - 97)  BP: 166/81 (07 Mar 2023 05:40) (100/61 - 166/81)  BP(mean): --  RR: 18 (07 Mar 2023 05:40) (18 - 18)  SpO2: 90% (07 Mar 2023 05:40) (90% - 98%)    Parameters below as of 07 Mar 2023 05:40  Patient On (Oxygen Delivery Method): room air        GENERAL: NAD, lying in bed   HEAD:  Normocephalic  EYES:  conjunctiva and sclera clear  ENT: Moist mucous membranes  NECK: Supple  CHEST/LUNG: Clear to auscultation bilaterally; No rales or rhonchi; no wheezing. Unlabored respirations  HEART: Regular rate and rhythm; S1S2+  ABDOMEN: Bowel sounds present; Soft, Nontender, Nondistended.   EXTREMITIES:  + distal Peripheral Pulses;  No cyanosis, or edema  NERVOUS SYSTEM:  Alert & Oriented X3;  No gross focal deficits   MSK: moves all extremities, able to ambulate with PT   SKIN: No rashes   psy: +memory loss    LABS:                        12.9   7.53  )-----------( 231      ( 07 Mar 2023 06:25 )             39.5     07 Mar 2023 06:25    140    |  108    |  20     ----------------------------<  91     3.8     |  26     |  1.00     Ca    8.3        07 Mar 2023 06:25          CAPILLARY BLOOD GLUCOSE            Culture - Urine (collected 03-04-23 @ 11:35)  Source: Catheterized Catheterized  Final Report (03-06-23 @ 12:36):    >100,000 CFU/ml Klebsiella pneumoniae  Organism: Klebsiella pneumoniae (03-06-23 @ 12:36)  Organism: Klebsiella pneumoniae (03-06-23 @ 12:36)      -  Amikacin: S <=16      -  Amoxicillin/Clavulanic Acid: S <=8/4      -  Ampicillin: R <=8 These ampicillin results predict results for amoxicillin      -  Ampicillin/Sulbactam: S <=4/2 Enterobacter, Klebsiella aerogenes, Citrobacter, and Serratia may develop resistance during prolonged therapy (3-4 days)      -  Aztreonam: S <=4      -  Cefazolin: S <=2 For uncomplicated UTI with K. pneumoniae, E. coli, or P. mirablis: PARRIS <=16 is sensitive and PARRIS >=32 is resistant. This also predicts results for oral agents cefaclor, cefdinir, cefpodoxime, cefprozil, cefuroxime axetil, cephalexin and locarbef for uncomplicated UTI. Note that some isolates may be susceptible to these agents while testing resistant to cefazolin.      -  Cefepime: S <=2      -  Cefoxitin: S <=8      -  Ceftriaxone: S <=1 Enterobacter, Klebsiella aerogenes, Citrobacter, and Serratia may develop resistance during prolonged therapy      -  Cefuroxime: S <=4      -  Ciprofloxacin: I 0.5      -  Ertapenem: S <=0.5      -  Gentamicin: S <=2      -  Imipenem: S <=1      -  Levofloxacin: S <=0.5      -  Meropenem: S <=1      -  Nitrofurantoin: I 64 Should not be used to treat pyelonephritis      -  Piperacillin/Tazobactam: S <=8      -  Tobramycin: S <=2      -  Trimethoprim/Sulfamethoxazole: R >2/38      Method Type: PARRIS    Culture - Blood (collected 03-04-23 @ 09:00)  Source: .Blood Blood-Peripheral  Preliminary Report (03-05-23 @ 16:01):    No growth to date.    Culture - Blood (collected 03-04-23 @ 08:55)  Source: .Blood Blood-Peripheral  Preliminary Report (03-05-23 @ 16:01):    No growth to date.        RADIOLOGY & ADDITIONAL TESTS:    Personally reviewed.     Consultant(s) Notes Reviewed:  [x] YES  [ ] NO    Plan of care discussed with patient /family daughter on the phone ; all questions answered

## 2023-03-07 NOTE — DISCHARGE NOTE PROVIDER - NSDCMRMEDTOKEN_GEN_ALL_CORE_FT
acetaminophen 325 mg oral tablet: 2 tab(s) orally every 6 hours, As needed, Temp greater or equal to 38C (100.4F), Mild Pain (1 - 3)  cholecalciferol 50 mcg (2000 intl units) oral tablet: 1 tab(s) orally once a day  dilTIAZem 300 mg/24 hours oral capsule, extended release: 1 cap(s) orally once a day  Dulcolax Laxative 10 mg rectal suppository: 1 suppository(ies) rectally once a day, As Needed -for constipation   folic acid 1 mg oral tablet: 1 tab(s) orally once a day  furosemide 20 mg oral tablet: 1 tab(s) orally once a day  lactobacillus acidophilus oral capsule: 1 tab(s) orally 2 times a day  levothyroxine 75 mcg (0.075 mg) oral tablet: 1 tab(s) orally once a day  methotrexate 2.5 mg oral tablet: 1 tab(s) orally once a week  mirtazapine 15 mg oral tablet: 1 tab(s) orally once a day (at bedtime)  Myrbetriq 25 mg oral tablet, extended release: 1 tab(s) orally once a day  pantoprazole 40 mg oral delayed release tablet: 1 tab(s) orally once a day  polyethylene glycol 3350 oral powder for reconstitution: 17 gram(s) orally once a day  potassium chloride 10 mEq oral tablet, extended release: 1 tab(s) orally once a day  sertraline 150 mg oral capsule: 1 cap(s) orally once a day  sulfamethoxazole-trimethoprim 800 mg-160 mg oral tablet: 1 tab(s) orally 2 times a day

## 2023-03-07 NOTE — DISCHARGE NOTE PROVIDER - NSDCCPCAREPLAN_GEN_ALL_CORE_FT
PRINCIPAL DISCHARGE DIAGNOSIS  Diagnosis: Urinary tract infection  Assessment and Plan of Treatment: you were treated with IV antibiotic and transit to oral upon discharge, comeplete antibiotic course as instructed , keep hydration      SECONDARY DISCHARGE DIAGNOSES  Diagnosis: AMS (altered mental status)  Assessment and Plan of Treatment: liklely due to acute infection with underlying dementia    Diagnosis: HTN (hypertension)  Assessment and Plan of Treatment: c/w home regiment    Diagnosis: Hypothyroidism  Assessment and Plan of Treatment: c/w home medication    Diagnosis: Nausea & vomiting  Assessment and Plan of Treatment:     Diagnosis: Constipation  Assessment and Plan of Treatment: miralax, suppository prn, hold if diarrhea

## 2023-03-07 NOTE — PROGRESS NOTE ADULT - PROBLEM SELECTOR PLAN 1
on Rocephin  ID eval noted  urine culture + K pneumoniae sensitive to rocephin   leukocytosis normalized   d/c planning   PT re-eval
on Rocephin->bactrim oral on discharge  urine culture + K pneumoniae   leukocytosis normalized   PT re-eval- no skilled PT needs  discussed with PCP Dr Goldberg on the phone
on Rocephin  ID eval noted  urine culture + G- zoila   trend leukocytosis, monitor vital sign

## 2023-03-09 ENCOUNTER — EMERGENCY (EMERGENCY)
Facility: HOSPITAL | Age: 89
LOS: 1 days | Discharge: HOME | End: 2023-03-09
Attending: INTERNAL MEDICINE | Admitting: INTERNAL MEDICINE
Payer: MEDICARE

## 2023-03-09 VITALS
DIASTOLIC BLOOD PRESSURE: 89 MMHG | RESPIRATION RATE: 16 BRPM | HEIGHT: 62 IN | WEIGHT: 139.99 LBS | TEMPERATURE: 98 F | HEART RATE: 66 BPM | SYSTOLIC BLOOD PRESSURE: 166 MMHG | OXYGEN SATURATION: 99 %

## 2023-03-09 DIAGNOSIS — Z98.89 OTHER SPECIFIED POSTPROCEDURAL STATES: Chronic | ICD-10-CM

## 2023-03-09 DIAGNOSIS — R19.00 INTRA-ABDOMINAL AND PELVIC SWELLING, MASS AND LUMP, UNSPECIFIED SITE: Chronic | ICD-10-CM

## 2023-03-09 DIAGNOSIS — H26.493 OTHER SECONDARY CATARACT, BILATERAL: Chronic | ICD-10-CM

## 2023-03-09 DIAGNOSIS — Z90.721 ACQUIRED ABSENCE OF OVARIES, UNILATERAL: Chronic | ICD-10-CM

## 2023-03-09 LAB
CULTURE RESULTS: SIGNIFICANT CHANGE UP
CULTURE RESULTS: SIGNIFICANT CHANGE UP
SPECIMEN SOURCE: SIGNIFICANT CHANGE UP
SPECIMEN SOURCE: SIGNIFICANT CHANGE UP

## 2023-03-09 PROCEDURE — 99284 EMERGENCY DEPT VISIT MOD MDM: CPT

## 2023-03-09 NOTE — ED ADULT TRIAGE NOTE - CHIEF COMPLAINT QUOTE
Patient brought in by ambulance from an assisted living as advised her medical alert bracelet went off  and was brought here; patient refusing to be touched

## 2023-03-09 NOTE — ED PROVIDER NOTE - PATIENT PORTAL LINK FT
You can access the FollowMyHealth Patient Portal offered by [facility] by registering at the following website: [email or web address]. By joining [employer]’s FollowMyHealth portal, you will also be able to view your health information using other applications (apps) compatible with our system.

## 2023-03-09 NOTE — ED PROVIDER NOTE - NEURO NEGATIVE STATEMENT, MLM
Malnutrition Findings:   Adult Malnutrition type: Chronic illness  Adult Degree of Malnutrition: Other severe protein calorie malnutrition  Malnutrition Characteristics: Fat loss, Muscle loss                  360 Statement: Pt presents with severe protein calorie malnutrition as evidenced by orbital hollowing, temporal scooping and prominent clavicle bone protrusion  BMI Findings: Body mass index is 23 63 kg/m²  no loss of consciousness, no gait abnormality, no headache, no sensory deficits, and no weakness.

## 2023-03-09 NOTE — ED PROVIDER NOTE - CARE PLAN
1 Principal Discharge DX:	Dementia   Principal Discharge DX:	Dementia  Secondary Diagnosis:	Mild anxiety

## 2023-03-09 NOTE — ED PROVIDER NOTE - OBJECTIVE STATEMENT
89 y/o WF with PMH of dementia, a-fib, hypothyroidism, R total hip replacement, GERD, HTN, the patient was discharged from  on 2/7/23, she was treated  for  weakness, altered mental status,  poor po intake,  nausea, vomiting  and failure to thrive,  nausea and vomiting x1 day, general malaise, chills, and persistent nausea.  Pt has  dementia and  short term memory loss. The patient was BIBEMS , they report that they responded to the patients  life alert. The patients son called the ED and indicated that it  was a false alarm, and he was coming to the ED to  his mother to  return her  home.  no headache, no chest pain, no SOB, no palpitations, no n/v/d, no urinary symptoms, no bleeding. no neuro changes. 89 y/o WF with PMH of dementia, a-fib, hypothyroidism, R total hip replacement, GERD, HTN, the patient was discharged from  on 2/7/23, she was treated  for  weakness, altered mental status,  poor po intake,  nausea, vomiting  and failure to thrive,  nausea and vomiting x1 day, general malaise, chills, and persistent nausea.  Pt has  dementia and  short term memory loss. The patient was BIBEMS , they report that they responded to the patients  life alert. The patients son called the ED and indicated that it  was a false alarm, and he was coming to the ED to  his mother to  return her  home.  no headache, no chest pain, no SOB, no palpitations, no n/v/d, no urinary symptoms, no bleeding. no neuro changes.  During the recent admission, PATRICIA was consulted and found that there ia a  home health aide 10 hours x 7 days a week to assist pt with ADLs and a RW she uses to ambulate. 89 y/o WF with PMH of dementia, a-fib, hypothyroidism, R total hip replacement, GERD, HTN, the patient was discharged from  on 2/7/23, she was treated  for  weakness, altered mental status,  poor po intake,  nausea, vomiting  and failure to thrive,  nausea and vomiting x1 day, general malaise, chills, and persistent nausea.  Pt has  dementia and  short term memory loss. The patient was BIBEMS , they report that they responded to the patients  life alert. The patients son called the ED and indicated that it  was a false alarm, and he was coming to the ED to  his mother to  return her  home.  no headache, no chest pain, no SOB, no palpitations, no n/v/d, no urinary symptoms, no bleeding. no neuro changes.  During the recent admission, PATRICIA was consulted and found that there is a  home health aide 10 hours x 7 days a week to assist pt with ADLs and a RW she uses to ambulate.

## 2023-03-09 NOTE — ED PROVIDER NOTE - CARE PROVIDER_API CALL
Goldberg, Steven M (MD)  Cardiovascular Disease; Internal Medicine  [address] [place] [address]  Phone: [phone]  Fax: [phone]  Follow Up Time: 1-3 Days

## 2023-03-09 NOTE — ED ADULT NURSE NOTE - NS_SISCREENINGSR_GEN_ALL_ED
POSTOPERATIVE INSTRUCTIONS For Ureteroscopy    After the procedure you may experience the following symptoms.  All of these are normal and should resolve within 1 or 2 days after your stent is removed.  - Urinary frequency (urinating more often than usual)  - Urinary urgency (the sensation that you need to urinate right away)  - Painful urination (this can be pain in your bladder or in your back when you urinate)  - Blood in your urine (a stent can irritate the lining of your bladder causing it to bleed)  - Back/Flank pain, especially with urination    Medications:  Ibuprofen - take as needed for pain, as the anti-inflammatory effects can be helpful.  Tamsulosin (Flomax) - Please take once a day for 14 days to  help relax your ureter and decrease stent discomfort; Please take once a day to help relax your ureter and decrease stent discomfort.  Watch for the more common side effects, which include but are not limited to dizziness, fatigue, nasal congestion, and call if you're having problems with these.  Tylenol - Please take as needed for pain  Stool softener - Please purchase and take while taking narcotics, in order to prevent constipation.  Hold for loose stools      Activity:  You may resume normal activities as tolerated.  No lifting restrictions.  Assistive Devices:  N/A, unless using one at baseline.  Bathing:  No restrictions.  Dressing:  N/A  Incision:  N/A  Stent:  If your stent has a string hanging from urethra, please do not cut the string.  Diet:  No dietary restrictions.  Return to work:  You may return to work the day after your procedure, if you are no longer taking narcotics.  Discharge to:  Home.        Stent removal:   Patient to remove stent by pulling strings on Thursday 6/6/2019  On the day you are supposed to remove your stent, do the following:  - When you wake up in the morning, take 1-2 pain pills with food.  - One hour later sit on the toilet or in the bath tub.   - Take a deep breath in  and while exhaling, pull the string while you count to three, 1-2-3.  (In males, the strings will be taped to your skin after the procedure.  In females, the strings will be taped together hanging outside your urethra.)  - The stent is about 1 foot in length and typically blue in color with curls on both ends. Continue to pull until it is completely removed.   - Dispose of the stent in the garbage.      After stent removal:  • Often 1-6 hours after you remove the stent you may get worse back pain.  • Pain may last up to 24 hours, but should begin to improve starting around 12 hours after the stent was removed.  • You may also experience additional blood in your urine, pain with urination or back/side pain during this time.   • You should take the Ibuprofen or the pain medication you were prescribed to help you with the pain.     The patient was instructed to call Urology On call Physician at [phone] should they experience any of the following:       - Temperature >101.4.       - Nausea, vomiting, diarrhea or constipation.       - Increased pain not controlled with PO pain medications.       - Signs or symptoms of infections.       - Decreased urine output or inability to urinate.    If you have any problems or questions related to your anesthesia, call the [facility] and ask for the anesthesiologist on call.  Although you may feel well, the medications given you may affect you for several hours.    Safety:    -Call for severe nausea.    -Do NOT drive your car for 24 hours, or as long as you are taking pain medications.  -Do NOT take part in activities requiring coordination or judgment for 24 hours (using lawn mowers, power tools, gas stove, food processors, etc.).  -Do NOT make important decisions for 48 hours (signing contracts, fritz, etc.).  -Go directly home and have a responsible adult available to help you.  -Do NOT drink alcoholic beverages for 24 hours.  -Drink only clear liquids if you feel  sick to your stomach.  -Eat a regular meal as tolerated, if you don't feel sick.    Follow Up:  Follow up in 3-5 weeks to discuss stone analysis.     You will be contacted with the appointment date and time.  If you are not contacted within 48 hours, please call [phone].       Negative

## 2023-03-09 NOTE — ED ADULT NURSE NOTE - OBJECTIVE STATEMENT
Brought in by EMS reports they responded to medical life alert and found patient sleeping and was confused and transported to ED. Patient was refusing treatment to ED and states she was fine. Patient called her son. As per son Clarke patient was discharged a week ago here in [place] and states that she has worsening dementia. As per son he will come to pick her. Dr. Astorga made aware.

## 2023-03-09 NOTE — ED PROVIDER NOTE - NSFOLLOWUPINSTRUCTIONS_ED_ALL_ED_FT
acetaminophen 325 mg oral tablet: 2 tab(s) orally every 6 hours, As needed, Temp greater or equal to 38C (100.4F), Mild Pain (1 - 3)  cholecalciferol 50 mcg (2000 intl units) oral tablet: 1 tab(s) orally once a day  dilTIAZem 300 mg/24 hours oral capsule, extended release: 1 cap(s) orally once a day  Dulcolax Laxative 10 mg rectal suppository: 1 suppository(ies) rectally once a day, As Needed -for constipation   folic acid 1 mg oral tablet: 1 tab(s) orally once a day  furosemide 20 mg oral tablet: 1 tab(s) orally once a day  lactobacillus acidophilus oral capsule: 1 tab(s) orally 2 times a day  levothyroxine 75 mcg (0.075 mg) oral tablet: 1 tab(s) orally once a day  methotrexate 2.5 mg oral tablet: 1 tab(s) orally once a week  tab(s) orally once a day  dilTIAZem 300 mg/24 hours oral capsule, extended release: 1 cap(s) orally once a day  Dulcolax Laxative 10 mg rectal suppository: 1 suppository(ies) rectally once a day, As Needed -for constipation   folic acid 1 mg oral tablet: 1 tab(s) orally once a day  furosemide 20 mg oral tablet: 1 tab(s) orally once a day  lactobacillus acidophilus oral capsule: 1 tab(s) orally 2 times a day  levothyroxine 75 mcg (0.075 mg) oral tablet: 1 tab(s) orally once a day  methotrexate 2.5 mg oral tablet: 1 tab(s) orally once a week  mirtazapine 15 mg oral tablet: 1 tab(s) orally once a day (at bedtime)  Myrbetriq 25 mg oral tablet, extended release: 1 tab(s) orally once a day  pantoprazole 40 mg oral delayed release tablet: 1 tab(s) orally once a day  polyethylene glycol 3350 oral powder for reconstitution: 17 gram(s) orally once a day  potassium chloride 10 mEq oral tablet, extended release: 1 tab(s) orally once a day  sertraline 150 mg oral capsule: 1 cap(s) orally once a day  sulfamethoxazole-trimethoprim 800 mg-160 mg oral tablet: 1 tab(s) orally 2 times a day

## 2023-03-09 NOTE — ED PROVIDER NOTE - PROVIDER TOKENS
Hello - I had a chance to follow up with Sharmaine this morning, she has worsening headaches/dizziness and lightheadedness that she describes as severe, this doesn't appear to be cardiac in nature and with history of pseudotumor cerebi, I wanted to check with you to see if further neurological evaluation could be planned for her. Thank you PROVIDER:[TOKEN:[[id number]],FOLLOWUP:[1-3 Days]]

## 2023-03-09 NOTE — ED PROVIDER NOTE - PROGRESS NOTE DETAILS
patient is declining evaluation and is requesting to go home, the son was contacted and is picking up his mother

## 2023-03-11 ENCOUNTER — INPATIENT (INPATIENT)
Facility: HOSPITAL | Age: 89
LOS: 7 days | Discharge: HOME | DRG: 57 | End: 2023-03-19
Attending: STUDENT IN AN ORGANIZED HEALTH CARE EDUCATION/TRAINING PROGRAM | Admitting: STUDENT IN AN ORGANIZED HEALTH CARE EDUCATION/TRAINING PROGRAM
Payer: MEDICARE

## 2023-03-11 VITALS
WEIGHT: 165.35 LBS | OXYGEN SATURATION: 95 % | SYSTOLIC BLOOD PRESSURE: 128 MMHG | RESPIRATION RATE: 20 BRPM | HEART RATE: 71 BPM | HEIGHT: 62 IN | TEMPERATURE: 98 F | DIASTOLIC BLOOD PRESSURE: 73 MMHG

## 2023-03-11 DIAGNOSIS — Z90.721 ACQUIRED ABSENCE OF OVARIES, UNILATERAL: Chronic | ICD-10-CM

## 2023-03-11 DIAGNOSIS — R19.00 INTRA-ABDOMINAL AND PELVIC SWELLING, MASS AND LUMP, UNSPECIFIED SITE: Chronic | ICD-10-CM

## 2023-03-11 DIAGNOSIS — H26.493 OTHER SECONDARY CATARACT, BILATERAL: Chronic | ICD-10-CM

## 2023-03-11 DIAGNOSIS — Z98.89 OTHER SPECIFIED POSTPROCEDURAL STATES: Chronic | ICD-10-CM

## 2023-03-11 LAB
ALBUMIN SERPL ELPH-MCNC: 3.6 G/DL — SIGNIFICANT CHANGE UP (ref 3.3–5)
ALP SERPL-CCNC: 57 U/L — SIGNIFICANT CHANGE UP (ref 40–120)
ALT FLD-CCNC: 27 U/L — SIGNIFICANT CHANGE UP (ref 12–78)
ANION GAP SERPL CALC-SCNC: 4 MMOL/L — LOW (ref 5–17)
AST SERPL-CCNC: 27 U/L — SIGNIFICANT CHANGE UP (ref 15–37)
BILIRUB SERPL-MCNC: 0.2 MG/DL — SIGNIFICANT CHANGE UP (ref 0.2–1.2)
BUN SERPL-MCNC: 24 MG/DL — HIGH (ref 7–23)
CALCIUM SERPL-MCNC: 9.9 MG/DL — SIGNIFICANT CHANGE UP (ref 8.5–10.1)
CHLORIDE SERPL-SCNC: 101 MMOL/L — SIGNIFICANT CHANGE UP (ref 96–108)
CO2 SERPL-SCNC: 27 MMOL/L — SIGNIFICANT CHANGE UP (ref 22–31)
CREAT SERPL-MCNC: 1.4 MG/DL — HIGH (ref 0.5–1.3)
EGFR: 35 ML/MIN/1.73M2 — LOW
GLUCOSE SERPL-MCNC: 107 MG/DL — HIGH (ref 70–99)
HCT VFR BLD CALC: 37.4 % — SIGNIFICANT CHANGE UP (ref 34.5–45)
HGB BLD-MCNC: 12.6 G/DL — SIGNIFICANT CHANGE UP (ref 11.5–15.5)
MCHC RBC-ENTMCNC: 31.7 PG — SIGNIFICANT CHANGE UP (ref 27–34)
MCHC RBC-ENTMCNC: 33.7 GM/DL — SIGNIFICANT CHANGE UP (ref 32–36)
MCV RBC AUTO: 94.2 FL — SIGNIFICANT CHANGE UP (ref 80–100)
NRBC # BLD: 0 /100 WBCS — SIGNIFICANT CHANGE UP (ref 0–0)
PLATELET # BLD AUTO: 226 K/UL — SIGNIFICANT CHANGE UP (ref 150–400)
POTASSIUM SERPL-MCNC: 4.6 MMOL/L — SIGNIFICANT CHANGE UP (ref 3.5–5.3)
POTASSIUM SERPL-SCNC: 4.6 MMOL/L — SIGNIFICANT CHANGE UP (ref 3.5–5.3)
PROT SERPL-MCNC: 7.5 G/DL — SIGNIFICANT CHANGE UP (ref 6–8.3)
RBC # BLD: 3.97 M/UL — SIGNIFICANT CHANGE UP (ref 3.8–5.2)
RBC # FLD: 13.3 % — SIGNIFICANT CHANGE UP (ref 10.3–14.5)
SODIUM SERPL-SCNC: 132 MMOL/L — LOW (ref 135–145)
WBC # BLD: 6.91 K/UL — SIGNIFICANT CHANGE UP (ref 3.8–10.5)
WBC # FLD AUTO: 6.91 K/UL — SIGNIFICANT CHANGE UP (ref 3.8–10.5)

## 2023-03-11 PROCEDURE — 99285 EMERGENCY DEPT VISIT HI MDM: CPT

## 2023-03-11 RX ORDER — QUETIAPINE FUMARATE 200 MG/1
25 TABLET, FILM COATED ORAL ONCE
Refills: 0 | Status: COMPLETED | OUTPATIENT
Start: 2023-03-11 | End: 2023-03-11

## 2023-03-11 RX ADMIN — QUETIAPINE FUMARATE 25 MILLIGRAM(S): 200 TABLET, FILM COATED ORAL at 23:53

## 2023-03-11 NOTE — ED PROVIDER NOTE - OBJECTIVE STATEMENT
h/x of dementia, uncooperative at triage 89 y/o female h/x of dementia, multiple visits after recent discharge for UTI, the patients son Clarke, reports  that his mom requires a higher level of care.  receives ten hours per day at the  assisted living, dementia is advancing, she will need higher level of care. no headache, no chest pain, no SOB, no palpitations, no n/v/d, no urinary symptoms, no bleeding. no neuro changes.

## 2023-03-11 NOTE — ED ADULT TRIAGE NOTE - CHIEF COMPLAINT QUOTE
Patient BIBA from [facility] facility complaining of AMS that started earlier this evening. Patient has h/x of dementia, uncooperative at triage, denies pain at this time [facility] complaining of [facility]MS / worsening dementia that started earlier this evening. Patient has h/x of dementia, uncooperative at triage, denies pain at this time

## 2023-03-11 NOTE — ED PROVIDER NOTE - CLINICAL SUMMARY MEDICAL DECISION MAKING FREE TEXT BOX
advancing dementia  who will require a higher level of care , Social service consult and  admission for placement

## 2023-03-11 NOTE — ED ADULT NURSE NOTE - CHIEF COMPLAINT QUOTE
Patient BIBA from retirement facility complaining of AMS / worsening dementia that started earlier this evening. Patient has h/x of dementia, uncooperative at triage, denies pain at this time

## 2023-03-11 NOTE — ED ADULT NURSE NOTE - OBJECTIVE STATEMENT
received today with increased dementia pt awake non compliant pt evaluated and blood work drawned and sent to lab medicated with seroquel po as ordered son at bedside and pt for possible admission

## 2023-03-12 DIAGNOSIS — R62.7 ADULT FAILURE TO THRIVE: ICD-10-CM

## 2023-03-12 DIAGNOSIS — Z29.9 ENCOUNTER FOR PROPHYLACTIC MEASURES, UNSPECIFIED: ICD-10-CM

## 2023-03-12 DIAGNOSIS — E03.9 HYPOTHYROIDISM, UNSPECIFIED: ICD-10-CM

## 2023-03-12 DIAGNOSIS — N39.0 URINARY TRACT INFECTION, SITE NOT SPECIFIED: ICD-10-CM

## 2023-03-12 DIAGNOSIS — F03.90 UNSPECIFIED DEMENTIA, UNSPECIFIED SEVERITY, WITHOUT BEHAVIORAL DISTURBANCE, PSYCHOTIC DISTURBANCE, MOOD DISTURBANCE, AND ANXIETY: ICD-10-CM

## 2023-03-12 DIAGNOSIS — N17.9 ACUTE KIDNEY FAILURE, UNSPECIFIED: ICD-10-CM

## 2023-03-12 DIAGNOSIS — I10 ESSENTIAL (PRIMARY) HYPERTENSION: ICD-10-CM

## 2023-03-12 DIAGNOSIS — K21.9 GASTRO-ESOPHAGEAL REFLUX DISEASE WITHOUT ESOPHAGITIS: ICD-10-CM

## 2023-03-12 LAB
APPEARANCE UR: CLEAR — SIGNIFICANT CHANGE UP
BILIRUB UR-MCNC: NEGATIVE — SIGNIFICANT CHANGE UP
COLOR SPEC: YELLOW — SIGNIFICANT CHANGE UP
DIFF PNL FLD: NEGATIVE — SIGNIFICANT CHANGE UP
GLUCOSE UR QL: NEGATIVE — SIGNIFICANT CHANGE UP
KETONES UR-MCNC: NEGATIVE — SIGNIFICANT CHANGE UP
LEUKOCYTE ESTERASE UR-ACNC: ABNORMAL
NITRITE UR-MCNC: NEGATIVE — SIGNIFICANT CHANGE UP
PH UR: 6 — SIGNIFICANT CHANGE UP (ref 5–8)
PROT UR-MCNC: 15
SARS-COV-2 RNA SPEC QL NAA+PROBE: SIGNIFICANT CHANGE UP
SP GR SPEC: 1.01 — SIGNIFICANT CHANGE UP (ref 1.01–1.02)
UROBILINOGEN FLD QL: NEGATIVE — SIGNIFICANT CHANGE UP

## 2023-03-12 PROCEDURE — 99223 1ST HOSP IP/OBS HIGH 75: CPT | Mod: GC

## 2023-03-12 RX ORDER — SODIUM CHLORIDE 9 MG/ML
1000 INJECTION INTRAMUSCULAR; INTRAVENOUS; SUBCUTANEOUS
Refills: 0 | Status: DISCONTINUED | OUTPATIENT
Start: 2023-03-12 | End: 2023-03-19

## 2023-03-12 RX ORDER — ACETAMINOPHEN 500 MG
650 TABLET ORAL EVERY 6 HOURS
Refills: 0 | Status: DISCONTINUED | OUTPATIENT
Start: 2023-03-12 | End: 2023-03-19

## 2023-03-12 RX ORDER — LANOLIN ALCOHOL/MO/W.PET/CERES
3 CREAM (GRAM) TOPICAL AT BEDTIME
Refills: 0 | Status: DISCONTINUED | OUTPATIENT
Start: 2023-03-12 | End: 2023-03-19

## 2023-03-12 RX ORDER — HEPARIN SODIUM 5000 [USP'U]/ML
5000 INJECTION INTRAVENOUS; SUBCUTANEOUS EVERY 12 HOURS
Refills: 0 | Status: DISCONTINUED | OUTPATIENT
Start: 2023-03-12 | End: 2023-03-19

## 2023-03-12 RX ORDER — HALOPERIDOL DECANOATE 100 MG/ML
0.5 INJECTION INTRAMUSCULAR ONCE
Refills: 0 | Status: COMPLETED | OUTPATIENT
Start: 2023-03-12 | End: 2023-03-14

## 2023-03-12 RX ORDER — FUROSEMIDE 40 MG
20 TABLET ORAL DAILY
Refills: 0 | Status: DISCONTINUED | OUTPATIENT
Start: 2023-03-12 | End: 2023-03-12

## 2023-03-12 RX ORDER — PANTOPRAZOLE SODIUM 20 MG/1
40 TABLET, DELAYED RELEASE ORAL
Refills: 0 | Status: DISCONTINUED | OUTPATIENT
Start: 2023-03-12 | End: 2023-03-19

## 2023-03-12 RX ORDER — QUETIAPINE FUMARATE 200 MG/1
25 TABLET, FILM COATED ORAL AT BEDTIME
Refills: 0 | Status: DISCONTINUED | OUTPATIENT
Start: 2023-03-13 | End: 2023-03-14

## 2023-03-12 RX ORDER — QUETIAPINE FUMARATE 200 MG/1
12.5 TABLET, FILM COATED ORAL DAILY
Refills: 0 | Status: DISCONTINUED | OUTPATIENT
Start: 2023-03-12 | End: 2023-03-19

## 2023-03-12 RX ORDER — QUETIAPINE FUMARATE 200 MG/1
12.5 TABLET, FILM COATED ORAL ONCE
Refills: 0 | Status: DISCONTINUED | OUTPATIENT
Start: 2023-03-12 | End: 2023-03-14

## 2023-03-12 RX ORDER — MIRTAZAPINE 45 MG/1
15 TABLET, ORALLY DISINTEGRATING ORAL AT BEDTIME
Refills: 0 | Status: DISCONTINUED | OUTPATIENT
Start: 2023-03-12 | End: 2023-03-19

## 2023-03-12 RX ORDER — DILTIAZEM HCL 120 MG
300 CAPSULE, EXT RELEASE 24 HR ORAL DAILY
Refills: 0 | Status: DISCONTINUED | OUTPATIENT
Start: 2023-03-12 | End: 2023-03-19

## 2023-03-12 RX ORDER — HALOPERIDOL DECANOATE 100 MG/ML
0.5 INJECTION INTRAMUSCULAR ONCE
Refills: 0 | Status: DISCONTINUED | OUTPATIENT
Start: 2023-03-12 | End: 2023-03-12

## 2023-03-12 RX ORDER — OLANZAPINE 15 MG/1
2.5 TABLET, FILM COATED ORAL ONCE
Refills: 0 | Status: COMPLETED | OUTPATIENT
Start: 2023-03-12 | End: 2023-03-12

## 2023-03-12 RX ORDER — OLANZAPINE 15 MG/1
2.5 TABLET, FILM COATED ORAL ONCE
Refills: 0 | Status: COMPLETED | OUTPATIENT
Start: 2023-03-12 | End: 2023-03-14

## 2023-03-12 RX ORDER — SERTRALINE 25 MG/1
100 TABLET, FILM COATED ORAL DAILY
Refills: 0 | Status: DISCONTINUED | OUTPATIENT
Start: 2023-03-12 | End: 2023-03-12

## 2023-03-12 RX ORDER — LEVOTHYROXINE SODIUM 125 MCG
75 TABLET ORAL DAILY
Refills: 0 | Status: DISCONTINUED | OUTPATIENT
Start: 2023-03-12 | End: 2023-03-19

## 2023-03-12 RX ORDER — QUETIAPINE FUMARATE 200 MG/1
25 TABLET, FILM COATED ORAL DAILY
Refills: 0 | Status: DISCONTINUED | OUTPATIENT
Start: 2023-03-12 | End: 2023-03-12

## 2023-03-12 RX ORDER — SERTRALINE 25 MG/1
150 TABLET, FILM COATED ORAL DAILY
Refills: 0 | Status: DISCONTINUED | OUTPATIENT
Start: 2023-03-12 | End: 2023-03-19

## 2023-03-12 RX ORDER — CHOLECALCIFEROL (VITAMIN D3) 125 MCG
1 CAPSULE ORAL
Qty: 0 | Refills: 0 | DISCHARGE

## 2023-03-12 RX ADMIN — OLANZAPINE 2.5 MILLIGRAM(S): 15 TABLET, FILM COATED ORAL at 11:16

## 2023-03-12 RX ADMIN — Medication 1 TABLET(S): at 08:31

## 2023-03-12 RX ADMIN — Medication 0.5 MILLIGRAM(S): at 07:56

## 2023-03-12 RX ADMIN — SERTRALINE 150 MILLIGRAM(S): 25 TABLET, FILM COATED ORAL at 12:49

## 2023-03-12 RX ADMIN — Medication 300 MILLIGRAM(S): at 08:30

## 2023-03-12 RX ADMIN — QUETIAPINE FUMARATE 25 MILLIGRAM(S): 200 TABLET, FILM COATED ORAL at 12:49

## 2023-03-12 RX ADMIN — HEPARIN SODIUM 5000 UNIT(S): 5000 INJECTION INTRAVENOUS; SUBCUTANEOUS at 21:11

## 2023-03-12 RX ADMIN — Medication 650 MILLIGRAM(S): at 15:36

## 2023-03-12 RX ADMIN — Medication 1 TABLET(S): at 21:11

## 2023-03-12 RX ADMIN — Medication 650 MILLIGRAM(S): at 14:36

## 2023-03-12 RX ADMIN — MIRTAZAPINE 15 MILLIGRAM(S): 45 TABLET, ORALLY DISINTEGRATING ORAL at 21:11

## 2023-03-12 NOTE — H&P ADULT - ATTENDING COMMENTS
89 yo F with PMH dementia, Afib not on AC, hypothyroidism, R total hip replacement, GERD, HTN presents to the ED with AMS.    Agree with above. Edited where appropriate

## 2023-03-12 NOTE — H&P ADULT - NSHPSOCIALHISTORY_GEN_ALL_CORE
Living Situation: lives at senior living facility - [facility]  ADLs/Mobility: ambulates with walker   Tobacco Use: denies  Alcohol Use: denies  Drug Use: denies

## 2023-03-12 NOTE — ED ADULT NURSE REASSESSMENT NOTE - NS ED NURSE REASSESS COMMENT FT1
pt admittedto floor vital signs stable pt wit dementia awake reoriented and  op distress report to be given to Rn for transfer to floor

## 2023-03-12 NOTE — PATIENT PROFILE ADULT - FALL HARM RISK - RISK INTERVENTIONS
Assistance OOB with selected safe patient handling equipment/Assistance with ambulation/Communicate Fall Risk and Risk Factors to all staff, patient, and family/Discuss with provider need for PT consult/Monitor for mental status changes/Monitor gait and stability/Provide patient with walking aids - walker, cane, crutches/Reinforce activity limits and safety measures with patient and family/Reorient to person, place and time as needed/Use of alarms - bed, chair and/or voice tab/Visual Cue: Yellow wristband/Bed in lowest position, wheels locked, appropriate side rails in place/Call bell, personal items and telephone in reach/Instruct patient to call for assistance before getting out of bed or chair/Non-slip footwear when patient is out of bed/[place] to call system/Physically safe environment - no spills, clutter or unnecessary equipment/Purposeful Proactive Rounding/Room/bathroom lighting operational, light cord in reach

## 2023-03-12 NOTE — H&P ADULT - PROBLEM SELECTOR PLAN 3
JIMENA likely 2/2 pre-renal azotemia in the setting of dehydration, poor PO intake  - Baseline creatinine 1.0  - Creatinine Currently 1.4  - IVF NS @60 cc/hr  - Monitor BMP  - Avoid nephrotoxic medications as able JIMENA likely 2/2 pre-renal azotemia in the setting of dehydration, poor PO intake  - Baseline creatinine 1.0  - Creatinine currently 1.4  - IVF NS @60 cc/hr  - Monitor BMP  - Avoid nephrotoxic medications as able JIMENA likely 2/2 pre-renal azotemia in the setting of dehydration, poor PO intake  - Baseline creatinine 1.0  - Creatinine currently 1.4  - HOLD lasix  - IVF NS @60 cc/hr  - Monitor BMP  - Avoid nephrotoxic medications as able Patient was previously admitted for PLV from 3/4 through 3/7, found to have UTI, positive for Klebsiella on Ucx  - Received 3d ceftriaxone, was discharged on TMP-SMX PO  - Continue PO TMP-SMX, 3 doses left  - F/u UA Patient was previously admitted for PLV from 3/4 through 3/7, found to have UTI, positive for Klebsiella on Ucx  - Received 3d ceftriaxone, was discharged on TMP-SMX PO through 3/13  - Continue PO TMP-SMX, last day 3/13  - F/u UA

## 2023-03-12 NOTE — ED ADULT NURSE REASSESSMENT NOTE - NS ED NURSE REASSESS COMMENT FT1
pt removed IV and as per MD Dunn pt to be transferred to floor pt stated she can have water and does not want fluids

## 2023-03-12 NOTE — H&P ADULT - PROBLEM SELECTOR PLAN 1
Patient presents with AMS, increased agitation since 3/10/23 secondary to progressive dementia vs. UTI  - Senior living facility unable to care for patient  - No current signs/symptoms of infection  - Received in ED seroquel 25mg x1  - F/u UA  - Continue PO TMP-SMX, 3 doses left  - Trend WBC, monitor for fevers  - Psych Dr. Cifuentes consulted, f/u recs  - Seroquel 25mg qhs PRN for agitation Patient presents with AMS, increased agitation since 3/10/23 secondary to progressive dementia vs. UTI  - Senior living facility unable to care for patient  - No current signs/symptoms of infection  - Received in ED seroquel 25mg x1  - F/u UA  - Continue PO TMP-SMX, 3 doses left  - Trend WBC, monitor for fevers  - Seroquel 25mg qhs PRN for agitation  - Psych Dr. Cifuentes consulted, f/u recs Patient presents with AMS, increased agitation since 3/10/23 secondary to progressive dementia vs. UTI  - Senior living facility unable to care for patient  - No current signs/symptoms of infection  - Received in ED seroquel 25mg x1  - F/u UA    - Trend WBC, monitor for fevers  - Seroquel 25mg qhs PRN for agitation  - Psych Dr. Cifuentes consulted, f/u recs Patient presents with AMS, increased agitation since 3/10/23 secondary to progressive dementia vs. UTI  - Senior living facility unable to care for patient  - No current signs/symptoms of infection  - Received in ED seroquel 25mg x1  - F/u UA  - Trend WBC, monitor for fevers  - Seroquel 25mg qd  - Psych Dr. Cifuentes consulted, f/u recs Patient presents with AMS, increased agitation since 3/10/23 secondary to progressive dementia vs less likely encephalopathy due to recent uti   - Senior living facility and son having difficulty caring for patient even with the help of HHA  - No current signs/symptoms of infection  - Received in ED seroquel 25mg x1  - F/u UA  - Seroquel 25mg qd  - Psych Dr. Cifuentes consulted, f/u recs

## 2023-03-12 NOTE — H&P ADULT - ASSESSMENT
89 yo F with PMH dementia, Afib not on AC, hypothyroidism, R total hip replacement, GERD, HTN presents to the ED with AMS 2/2 progressive dementia.  89 yo F with PMH dementia, Afib not on AC, hypothyroidism, R total hip replacement, GERD, HTN presents to the ED with AMS 2/2 progressive dementia vs. ?UTI. 89 yo F with PMH dementia, Afib not on AC, hypothyroidism, R total hip replacement, GERD, HTN presents to the ED with AMS.

## 2023-03-12 NOTE — H&P ADULT - PROBLEM SELECTOR PLAN 4
Chronic  Previous TSH 3.07 wnl as of 3/7  - Continue home levothyroxine JIMENA likely 2/2 pre-renal azotemia in the setting of dehydration, poor PO intake  - Baseline creatinine 1.0  - Creatinine currently 1.4  - HOLD lasix  - IVF NS @60 cc/hr  - Monitor BMP  - Avoid nephrotoxic medications as able JIMENA likely 2/2 pre-renal azotemia in the setting of dehydration, poor PO intake  - Baseline creatinine 1.0  - Creatinine currently 1.4  - HOLD lasix  - IVF NS @60 cc/hr for 24h  - Monitor BMP  - Avoid nephrotoxic medications as able

## 2023-03-12 NOTE — H&P ADULT - PROBLEM SELECTOR PLAN 5
Chronic, 128/73 on admission  - Continue home medications diltiazem and lasix with hold parameters  - Monitor routine hemodynamics Chronic, 128/73 on admission  - Continue home medications diltiazem   - HOLD lasix   - Monitor routine hemodynamics Chronic  Previous TSH 3.07 wnl as of 3/7  - Continue home levothyroxine

## 2023-03-12 NOTE — H&P ADULT - PROBLEM SELECTOR PLAN 6
Chronic  - Continue home medications: pantoprazole Chronic, 128/73 on admission  - Continue home medications diltiazem   - HOLD lasix   - Monitor routine hemodynamics

## 2023-03-12 NOTE — H&P ADULT - PROBLEM SELECTOR PLAN 2
Patient with poor PO intake, BUN 24, Cr 1.4  - Continue home remeron Patient with poor PO intake, BUN 24, Cr 1.4  - Continue home remeron, sertraline Patient with poor PO intake, BUN 24, Cr 1.4  - Continue home remeron, sertraline  - encourage po intake   - gentle ivf

## 2023-03-12 NOTE — H&P ADULT - CONVERSATION DETAILS
Patient's son says he is unsure of any advanced directives of the patient. Patient remains full code.

## 2023-03-12 NOTE — H&P ADULT - NSHPPHYSICALEXAM_GEN_ALL_CORE
T(C): 36.5 (03-11-23 @ 22:31), Max: 36.5 (03-11-23 @ 22:31)  HR: 71 (03-11-23 @ 22:31) (71 - 71)  BP: 128/73 (03-11-23 @ 22:31) (128/73 - 128/73)  RR: 20 (03-11-23 @ 22:31) (20 - 20)  SpO2: 95% (03-11-23 @ 22:31) (95% - 95%)  Wt(kg): --    Physical Exam:   GENERAL: combative, agitated  HEENT: head NC/AT; conjunctiva & sclera clear; hearing grossly intact, dry mucous membranes  NECK: supple  RESPIRATORY: CTA B/L, no wheezing, rales, rhonchi or rubs  CARDIOVASCULAR: S1&S2, tachycardic 2/2 agitation  ABDOMEN: soft, non-tender, non-distended, no guarding, rebound or rigidity  MUSCULOSKELETAL:  no clubbing, cyanosis of all 4 extremities. + bilateral LE nonpitting edema  VASCULAR: Radial pulses 2+ bilaterally, no varicose veins   SKIN: warm and dry, ecchymosis LUE cubital fossa 2/2 previous IV  NEUROLOGIC: AA&O X1 to person, no focal deficits, no sensory loss, motor Strength 5/5 in UE & LE B/L  Psych: Normal mood and affect, normal behavior T(C): 36.5 (03-11-23 @ 22:31), Max: 36.5 (03-11-23 @ 22:31)  HR: 71 (03-11-23 @ 22:31) (71 - 71)  BP: 128/73 (03-11-23 @ 22:31) (128/73 - 128/73)  RR: 20 (03-11-23 @ 22:31) (20 - 20)  SpO2: 95% (03-11-23 @ 22:31) (95% - 95%)  Wt(kg): --  Physical Exam:   GENERAL: combative, agitated  HEENT: head NC/AT; conjunctiva & sclera clear; hearing grossly intact, dry mucous membranes  NECK: supple  RESPIRATORY: CTA B/L, no wheezing, rales, rhonchi or rubs  CARDIOVASCULAR: S1&S2, tachycardic 2/2 agitation  ABDOMEN: soft, non-tender, non-distended, no guarding, rebound or rigidity  MUSCULOSKELETAL:  no clubbing, cyanosis of all 4 extremities. + bilateral LE nonpitting edema  VASCULAR: Radial pulses 2+ bilaterally, no varicose veins   SKIN: warm and dry, ecchymosis LUE cubital fossa 2/2 previous IV  NEUROLOGIC: AA&O X1 to person, no focal deficits, no sensory loss, motor Strength 5/5 in UE & LE B/L  Psych: Normal mood and affect, normal behavior

## 2023-03-12 NOTE — H&P ADULT - HISTORY OF PRESENT ILLNESS
89 yo F with PMH dementia, Afib not on AC, hypothyroidism, R total hip replacement, GERD, HTN presents to the ED with AMS. Patient combative and uncooperative at time of admission. Son Clarke present at bedside to provide history, Son reports patient was discharged on 3/7 Tuesday and was mostly cooperative, at her mental baseline until about Friday 3/10. Son reports that she has intermittent episodes of agitation and confusion. Today, patient thought she was in [place], called her children and grandchildren every 10 minutes. Patient has been verbally abusive to her HHA. [facility] facility reported that patient was yelling and agitated in the library. Patient reports that patient expresses thoughts of hurting herself but "only does this to make others feel guilty".    Patient was recently hospitalized at [facility] from 3/4 through 3/7/2023 for AMS, found to have UTI. Patient has 3 more tablets left of PO TMP-SMX. Last dose taken was this AM.     ED Course:   Vitals: BP: 128/73, HR: 71, Temp: 97.7, RR: 20, SpO2: 95% on RA  Labs:  Na 132, AG 4, BUN 24, Cr 1.4, Gluc 107, eGFR 35  UA: pending  EKG: pending  Received in the ED quetiapine 25mg x1   89 yo F with PMH dementia, Afib not on AC, hypothyroidism, R total hip replacement, GERD, HTN presents to the ED with AMS. Patient combative and uncooperative at time of admission. Son Clarke present at bedside to provide history, Son reports patient was discharged on 3/7 Tuesday and was mostly cooperative, at her mental baseline until about Friday 3/10. Son reports that she has intermittent episodes of agitation and confusion. Today, patient thought she was in [place], called her children and grandchildren every 10 minutes. Patient has been verbally abusive to her HHA. [facility] reported that patient was yelling and agitated in the library. Son reports that patient expresses thoughts intermittently of hurting herself but "only does this to make others feel guilty".    Patient was recently hospitalized at [facility] from 3/4 through 3/7/2023 for AMS, found to have UTI. Patient has 3 more tablets left of PO TMP-SMX. Last dose taken was this AM.     ED Course:   Vitals: BP: 128/73, HR: 71, Temp: 97.7, RR: 20, SpO2: 95% on RA  Labs:  Na 132, AG 4, BUN 24, Cr 1.4, Gluc 107, eGFR 35  UA: pending  EKG: pending  Received in the ED quetiapine 25mg x1   89 yo F with PMH dementia, Afib not on AC, hypothyroidism, R total hip replacement, GERD, HTN presents to the ED with AMS. Patient combative and uncooperative at time of admission. Son Clarke present at bedside to provide history, Son reports patient was discharged on 3/7 Tuesday and was mostly cooperative, at her mental baseline until about Friday 3/10. Son reports that she has intermittent episodes of agitation and confusion. Today, patient thought she was in [place], called her children and grandchildren every 10 minutes. Patient has been verbally abusive to her HHA. [facility] reported that patient was yelling and agitated in the library.     Patient was recently hospitalized at [facility] from 3/4 through 3/7/2023 for AMS, found to have UTI. Patient has 3 more tablets left of PO TMP-SMX. Last dose taken was this AM.     ED Course:   Vitals: BP: 128/73, HR: 71, Temp: 97.7, RR: 20, SpO2: 95% on RA  Labs:  Na 132, AG 4, BUN 24, Cr 1.4, Gluc 107, eGFR 35  UA: pending  Received in the ED quetiapine 25mg x1

## 2023-03-13 LAB
ALBUMIN SERPL ELPH-MCNC: 3.2 G/DL — LOW (ref 3.3–5)
ALP SERPL-CCNC: 53 U/L — SIGNIFICANT CHANGE UP (ref 40–120)
ALT FLD-CCNC: 32 U/L — SIGNIFICANT CHANGE UP (ref 12–78)
ANION GAP SERPL CALC-SCNC: 3 MMOL/L — LOW (ref 5–17)
AST SERPL-CCNC: 25 U/L — SIGNIFICANT CHANGE UP (ref 15–37)
BASOPHILS # BLD AUTO: 0.05 K/UL — SIGNIFICANT CHANGE UP (ref 0–0.2)
BASOPHILS NFR BLD AUTO: 0.8 % — SIGNIFICANT CHANGE UP (ref 0–2)
BILIRUB SERPL-MCNC: 0.3 MG/DL — SIGNIFICANT CHANGE UP (ref 0.2–1.2)
BUN SERPL-MCNC: 20 MG/DL — SIGNIFICANT CHANGE UP (ref 7–23)
CALCIUM SERPL-MCNC: 9.1 MG/DL — SIGNIFICANT CHANGE UP (ref 8.5–10.1)
CHLORIDE SERPL-SCNC: 105 MMOL/L — SIGNIFICANT CHANGE UP (ref 96–108)
CO2 SERPL-SCNC: 27 MMOL/L — SIGNIFICANT CHANGE UP (ref 22–31)
CREAT SERPL-MCNC: 1.2 MG/DL — SIGNIFICANT CHANGE UP (ref 0.5–1.3)
EGFR: 42 ML/MIN/1.73M2 — LOW
EOSINOPHIL # BLD AUTO: 0.61 K/UL — HIGH (ref 0–0.5)
EOSINOPHIL NFR BLD AUTO: 9.6 % — HIGH (ref 0–6)
GLUCOSE SERPL-MCNC: 88 MG/DL — SIGNIFICANT CHANGE UP (ref 70–99)
HCT VFR BLD CALC: 37.3 % — SIGNIFICANT CHANGE UP (ref 34.5–45)
HGB BLD-MCNC: 12.3 G/DL — SIGNIFICANT CHANGE UP (ref 11.5–15.5)
IMM GRANULOCYTES NFR BLD AUTO: 0.6 % — SIGNIFICANT CHANGE UP (ref 0–0.9)
LYMPHOCYTES # BLD AUTO: 1.36 K/UL — SIGNIFICANT CHANGE UP (ref 1–3.3)
LYMPHOCYTES # BLD AUTO: 21.4 % — SIGNIFICANT CHANGE UP (ref 13–44)
MCHC RBC-ENTMCNC: 31.5 PG — SIGNIFICANT CHANGE UP (ref 27–34)
MCHC RBC-ENTMCNC: 33 GM/DL — SIGNIFICANT CHANGE UP (ref 32–36)
MCV RBC AUTO: 95.6 FL — SIGNIFICANT CHANGE UP (ref 80–100)
MONOCYTES # BLD AUTO: 0.52 K/UL — SIGNIFICANT CHANGE UP (ref 0–0.9)
MONOCYTES NFR BLD AUTO: 8.2 % — SIGNIFICANT CHANGE UP (ref 2–14)
NEUTROPHILS # BLD AUTO: 3.77 K/UL — SIGNIFICANT CHANGE UP (ref 1.8–7.4)
NEUTROPHILS NFR BLD AUTO: 59.4 % — SIGNIFICANT CHANGE UP (ref 43–77)
NRBC # BLD: 0 /100 WBCS — SIGNIFICANT CHANGE UP (ref 0–0)
PLATELET # BLD AUTO: 216 K/UL — SIGNIFICANT CHANGE UP (ref 150–400)
POTASSIUM SERPL-MCNC: 4.3 MMOL/L — SIGNIFICANT CHANGE UP (ref 3.5–5.3)
POTASSIUM SERPL-SCNC: 4.3 MMOL/L — SIGNIFICANT CHANGE UP (ref 3.5–5.3)
PROT SERPL-MCNC: 6.9 G/DL — SIGNIFICANT CHANGE UP (ref 6–8.3)
RBC # BLD: 3.9 M/UL — SIGNIFICANT CHANGE UP (ref 3.8–5.2)
RBC # FLD: 13.5 % — SIGNIFICANT CHANGE UP (ref 10.3–14.5)
SODIUM SERPL-SCNC: 135 MMOL/L — SIGNIFICANT CHANGE UP (ref 135–145)
WBC # BLD: 6.35 K/UL — SIGNIFICANT CHANGE UP (ref 3.8–10.5)
WBC # FLD AUTO: 6.35 K/UL — SIGNIFICANT CHANGE UP (ref 3.8–10.5)

## 2023-03-13 PROCEDURE — 99231 SBSQ HOSP IP/OBS SF/LOW 25: CPT

## 2023-03-13 PROCEDURE — 99233 SBSQ HOSP IP/OBS HIGH 50: CPT

## 2023-03-13 PROCEDURE — 70450 CT HEAD/BRAIN W/O DYE: CPT | Mod: 26

## 2023-03-13 RX ORDER — ONDANSETRON 8 MG/1
4 TABLET, FILM COATED ORAL EVERY 12 HOURS
Refills: 0 | Status: DISCONTINUED | OUTPATIENT
Start: 2023-03-13 | End: 2023-03-13

## 2023-03-13 RX ORDER — ONDANSETRON 8 MG/1
4 TABLET, FILM COATED ORAL EVERY 6 HOURS
Refills: 0 | Status: DISCONTINUED | OUTPATIENT
Start: 2023-03-13 | End: 2023-03-19

## 2023-03-13 RX ADMIN — ONDANSETRON 4 MILLIGRAM(S): 8 TABLET, FILM COATED ORAL at 12:41

## 2023-03-13 RX ADMIN — PANTOPRAZOLE SODIUM 40 MILLIGRAM(S): 20 TABLET, DELAYED RELEASE ORAL at 05:43

## 2023-03-13 RX ADMIN — SERTRALINE 150 MILLIGRAM(S): 25 TABLET, FILM COATED ORAL at 12:38

## 2023-03-13 RX ADMIN — HEPARIN SODIUM 5000 UNIT(S): 5000 INJECTION INTRAVENOUS; SUBCUTANEOUS at 18:38

## 2023-03-13 RX ADMIN — HEPARIN SODIUM 5000 UNIT(S): 5000 INJECTION INTRAVENOUS; SUBCUTANEOUS at 05:43

## 2023-03-13 RX ADMIN — Medication 300 MILLIGRAM(S): at 05:43

## 2023-03-13 RX ADMIN — MIRTAZAPINE 15 MILLIGRAM(S): 45 TABLET, ORALLY DISINTEGRATING ORAL at 21:36

## 2023-03-13 RX ADMIN — Medication 75 MICROGRAM(S): at 05:43

## 2023-03-13 RX ADMIN — QUETIAPINE FUMARATE 25 MILLIGRAM(S): 200 TABLET, FILM COATED ORAL at 21:36

## 2023-03-13 RX ADMIN — Medication 1 TABLET(S): at 05:43

## 2023-03-13 NOTE — CONSULT NOTE ADULT - SUBJECTIVE AND OBJECTIVE BOX
ams dementia with agitation   psych follow up medications as needed  necrologic wise stable  spoke to daughter in detail

## 2023-03-13 NOTE — SWALLOW BEDSIDE ASSESSMENT ADULT - ADDITIONAL RECOMMENDATIONS
This dept to f/u as schedule permits to ensure diet tolerance and trial swallow therapy. MD advised to further reconsult this service should concern re: diet management and/or change in status arise.

## 2023-03-13 NOTE — CARE COORDINATION ASSESSMENT. - OTHER PERTINENT DISCHARGE PLANNING INFORMATION:
Met with patient at bedside.  Patient with h/o dementia.  Spoke with son Clarke at bedside to discuss the role of case management with verbalized understanding.   Needs unclear at present.  Patient recently discharged less than one week ago after receiving treatment for UTI.  Patient returned to [facility] with 24/7 private HHA support.  Patient son noted increase in confusion and agitation. Clarke expressed uncertainty on discharge course when medically ready.  WHether it is returnin to PTA status and 24/7 HHA or whether patient will require a higher level of care.  Clarke states he will discuss with family and advise case management of plans.  Clarke is aware that CM and SW are available to assist.  Spoke with Angie; director at [facility].  She expressed no concerns at patient returning when medically appropriate.  SHe will need D/C summary emailed to [email or web address] when available.  To follow

## 2023-03-13 NOTE — SOCIAL WORK PROGRESS NOTE - NSSWPROGRESSNOTE_GEN_ALL_CORE
SW consulted, plan to return to Independent living with 24 hr private aides. SW remains available if plan changes.

## 2023-03-13 NOTE — SWALLOW BEDSIDE ASSESSMENT ADULT - SWALLOW EVAL: DIAGNOSIS
functional oral management given regular/solids, puree and thin liquid textures marked by adequate bolus collection, transfer and transport. mild pharyngeal dysphagia given above consistencies marked by delayed pharyngeal swallow trigger and reduced hyolaryngeal excursion. no overt s/s of penetration/aspiration noted.

## 2023-03-13 NOTE — CARE COORDINATION ASSESSMENT. - NSCAREPROVIDERS_GEN_ALL_CORE_FT
CARE PROVIDERS:  Accepting Physician: Maribel Sales  Administration: Evie Fernandez  Administration: Denae Griffiths  Admitting: Maribel Sales  Attending: Maribel Sales  Case Management: David Sood  Consultant: Gamaliel Cifuentes  Consultant: Mark Chen  Covering Team: Matheus Franklin  Covering Team: Renae Best  ED Attending: Andrés Astorga  ED Nurse: Clari Stanley  Nurse: Linda Montes  Nurse: Jen Lange  Ordered: Doctor, Unknown  Ordered: Cedric Javier  Outpatient Provider: Mark Chen  Override: Katherine Orozco  Override: Karla Turner  PCA/Nursing Assistant: Kennedi Goodson  PCA/Nursing Assistant: Jennie Shin  Physical Therapy: Shannan Adams  Primary Team: Yo Lockwood  Primary Team: Marcial Best  Registered Dietitian: Chey Solis  Research: Mark Chen  Respiratory Therapy: Justice Dunne  : Jina Mckenna

## 2023-03-13 NOTE — PROGRESS NOTE ADULT - SUBJECTIVE AND OBJECTIVE BOX
Patient is a 89y old  Female who presents with a chief complaint of AMS 2/2 progressive dementia (12 Mar 2023 01:10)      INTERVAL HPI/OVERNIGHT EVENTS: Seen and examined at bedside. Events noted.     MEDICATIONS  (STANDING):  diltiazem    milliGRAM(s) Oral daily  heparin   Injectable 5000 Unit(s) SubCutaneous every 12 hours  levothyroxine 75 MICROGram(s) Oral daily  mirtazapine 15 milliGRAM(s) Oral at bedtime  pantoprazole    Tablet 40 milliGRAM(s) Oral before breakfast  QUEtiapine 25 milliGRAM(s) Oral at bedtime  sertraline 150 milliGRAM(s) Oral daily  sodium chloride 0.9%. 1000 milliLiter(s) (60 mL/Hr) IV Continuous <Continuous>    MEDICATIONS  (PRN):  acetaminophen     Tablet .. 650 milliGRAM(s) Oral every 6 hours PRN Temp greater or equal to 38C (100.4F), Mild Pain (1 - 3)  haloperidol    Injectable 0.5 milliGRAM(s) IntraMuscular once PRN Agitation  melatonin 3 milliGRAM(s) Oral at bedtime PRN Insomnia  OLANZapine Injectable 2.5 milliGRAM(s) IntraMuscular once PRN agitation  QUEtiapine 12.5 milliGRAM(s) Oral daily PRN for agitation in am  QUEtiapine 12.5 milliGRAM(s) Oral once PRN Agitation      Allergies    Keflex (Other)    Intolerances        REVIEW OF SYSTEMS:  Unable to obtain, patient is confused   Vital Signs Last 24 Hrs  T(C): 36.6 (13 Mar 2023 05:18), Max: 36.7 (12 Mar 2023 15:20)  T(F): 97.9 (13 Mar 2023 05:18), Max: 98.1 (12 Mar 2023 20:24)  HR: 73 (13 Mar 2023 05:18) (64 - 77)  BP: 176/74 (13 Mar 2023 05:18) (98/58 - 176/74)  BP(mean): --  RR: 18 (13 Mar 2023 05:18) (16 - 18)  SpO2: 93% (13 Mar 2023 05:18) (91% - 94%)    Parameters below as of 13 Mar 2023 05:18  Patient On (Oxygen Delivery Method): nasal cannula  O2 Flow (L/min): 2      PHYSICAL EXAM:  GENERAL: NAD  HEAD:  Atraumatic, Normocephalic  EYES: EOMI, PERRLA, conjunctiva and sclera clear  ENMT: No tonsillar erythema, exudates, or enlargement  NECK: Supple, No JVD, Normal thyroid  NERVOUS SYSTEM:  confused at baseline due to dementia   CHEST/LUNG: Clear to auscultation bilaterally; No rales, rhonchi, wheezing, or rubs  HEART: S1S2+, Regular rate and rhythm  ABDOMEN: Soft, Nontender, Nondistended; Bowel sounds present  EXTREMITIES:  2+ Peripheral Pulses, No clubbing, cyanosis  LYMPH: No lymphadenopathy noted  SKIN: No rashes or lesions    LABS:                        12.3   6.35  )-----------( 216      ( 13 Mar 2023 07:22 )             37.3     13 Mar 2023 07:22    x      |  x      |  20     ----------------------------<  88     x       |  27     |  1.20     Ca    9.1        13 Mar 2023 07:22    TPro  x      /  Alb  3.2    /  TBili  x      /  DBili  x      /  AST  25     /  ALT  32     /  AlkPhos  x      13 Mar 2023 07:22      CAPILLARY BLOOD GLUCOSE        BLOOD CULTURE    RADIOLOGY & ADDITIONAL TESTS:    Imaging Personally Reviewed:  [ ] YES     Consultant(s) Notes Reviewed:      Care Discussed with Consultants/Other Providers:

## 2023-03-13 NOTE — PHYSICAL THERAPY INITIAL EVALUATION ADULT - ADDITIONAL COMMENTS
Pt resides at an independent living facility and was ambulatory w/ RW and can get assist as needed for ADLs as per son.

## 2023-03-13 NOTE — PROGRESS NOTE ADULT - ASSESSMENT
89 yo F with PMH dementia, Afib not on AC, hypothyroidism, R total hip replacement, GERD, HTN admitted for AMS/agitation

## 2023-03-13 NOTE — PHYSICAL THERAPY INITIAL EVALUATION ADULT - PERTINENT HX OF CURRENT PROBLEM, REHAB EVAL
89 yo F adm 3/12 with PMH dementia, Afib not on AC, hypothyroidism, R total hip replacement, GERD, HTN presents to the ED with AMS secondary to progressive dementia.  Pt recently d/c from this hospital 3/7 after being treated for UTI. CT head: negative.

## 2023-03-13 NOTE — SWALLOW BEDSIDE ASSESSMENT ADULT - SWALLOW EVAL: VELAR ELEVATION
Message  Called and spoke to patient's daughter about scheduling a consultation appointment with Dr. Fernandez. She was confused about why Dr. Fernandez would be involved as her mom had a stent placed and was scheduled for a procedure to remove the stent with Dr. Walton. I explained that the referral was made to consult about removing gall bladder because often the attacks will happen more than once. She said she wanted to wait until after the procedure with Isabelle and I told her that is absolutely fine and to call back if her mom wanted to consult with Dr. Fernandez.      Signatures   Electronically signed by : Cary Newman R.N.; Oct  4 2017  8:46AM CST (Author)    
intact

## 2023-03-13 NOTE — SWALLOW BEDSIDE ASSESSMENT ADULT - COMMENTS
Pt was awake, cooperative and positioned chairside for a clinical assessment of swallow function this PM. Per charting, pt is a "89 yo F with PMH dementia, Afib not on AC, hypothyroidism, R total hip replacement, GERD, HTN admitted for AMS/agitation "    Recent CXR "The heart is unremarkable. The lungs are clear. No acute osseous abnormality."  Recent head CT "NO EVIDENCE OF ACUTE INTRACRANIAL HEMORRHAGE OR HYDROCEPHALUS. ATROPHY WITH WHITE MATTER ISCHEMIC CHANGES."    At the time of today's assessment, pt denies dysphagia prior to admission.

## 2023-03-14 LAB
ANION GAP SERPL CALC-SCNC: 7 MMOL/L — SIGNIFICANT CHANGE UP (ref 5–17)
BUN SERPL-MCNC: 18 MG/DL — SIGNIFICANT CHANGE UP (ref 7–23)
CALCIUM SERPL-MCNC: 8.7 MG/DL — SIGNIFICANT CHANGE UP (ref 8.5–10.1)
CHLORIDE SERPL-SCNC: 102 MMOL/L — SIGNIFICANT CHANGE UP (ref 96–108)
CO2 SERPL-SCNC: 23 MMOL/L — SIGNIFICANT CHANGE UP (ref 22–31)
CREAT SERPL-MCNC: 1.2 MG/DL — SIGNIFICANT CHANGE UP (ref 0.5–1.3)
EGFR: 42 ML/MIN/1.73M2 — LOW
FOLATE SERPL-MCNC: 14 NG/ML — SIGNIFICANT CHANGE UP
GLUCOSE SERPL-MCNC: 112 MG/DL — HIGH (ref 70–99)
HCT VFR BLD CALC: 38 % — SIGNIFICANT CHANGE UP (ref 34.5–45)
HGB BLD-MCNC: 13.1 G/DL — SIGNIFICANT CHANGE UP (ref 11.5–15.5)
MCHC RBC-ENTMCNC: 31.9 PG — SIGNIFICANT CHANGE UP (ref 27–34)
MCHC RBC-ENTMCNC: 34.5 GM/DL — SIGNIFICANT CHANGE UP (ref 32–36)
MCV RBC AUTO: 92.5 FL — SIGNIFICANT CHANGE UP (ref 80–100)
NRBC # BLD: 0 /100 WBCS — SIGNIFICANT CHANGE UP (ref 0–0)
PLATELET # BLD AUTO: 233 K/UL — SIGNIFICANT CHANGE UP (ref 150–400)
POTASSIUM SERPL-MCNC: 4.4 MMOL/L — SIGNIFICANT CHANGE UP (ref 3.5–5.3)
POTASSIUM SERPL-SCNC: 4.4 MMOL/L — SIGNIFICANT CHANGE UP (ref 3.5–5.3)
RBC # BLD: 4.11 M/UL — SIGNIFICANT CHANGE UP (ref 3.8–5.2)
RBC # FLD: 13.2 % — SIGNIFICANT CHANGE UP (ref 10.3–14.5)
SODIUM SERPL-SCNC: 132 MMOL/L — LOW (ref 135–145)
VIT B12 SERPL-MCNC: 560 PG/ML — SIGNIFICANT CHANGE UP (ref 232–1245)
WBC # BLD: 5.34 K/UL — SIGNIFICANT CHANGE UP (ref 3.8–10.5)
WBC # FLD AUTO: 5.34 K/UL — SIGNIFICANT CHANGE UP (ref 3.8–10.5)

## 2023-03-14 PROCEDURE — 99231 SBSQ HOSP IP/OBS SF/LOW 25: CPT

## 2023-03-14 PROCEDURE — 99233 SBSQ HOSP IP/OBS HIGH 50: CPT

## 2023-03-14 RX ORDER — OLANZAPINE 15 MG/1
5 TABLET, FILM COATED ORAL EVERY 12 HOURS
Refills: 0 | Status: DISCONTINUED | OUTPATIENT
Start: 2023-03-14 | End: 2023-03-19

## 2023-03-14 RX ORDER — HALOPERIDOL DECANOATE 100 MG/ML
0.5 INJECTION INTRAMUSCULAR ONCE
Refills: 0 | Status: COMPLETED | OUTPATIENT
Start: 2023-03-14 | End: 2023-03-14

## 2023-03-14 RX ORDER — QUETIAPINE FUMARATE 200 MG/1
50 TABLET, FILM COATED ORAL AT BEDTIME
Refills: 0 | Status: DISCONTINUED | OUTPATIENT
Start: 2023-03-14 | End: 2023-03-18

## 2023-03-14 RX ADMIN — Medication 3 MILLIGRAM(S): at 22:36

## 2023-03-14 RX ADMIN — MIRTAZAPINE 15 MILLIGRAM(S): 45 TABLET, ORALLY DISINTEGRATING ORAL at 22:36

## 2023-03-14 RX ADMIN — OLANZAPINE 2.5 MILLIGRAM(S): 15 TABLET, FILM COATED ORAL at 06:05

## 2023-03-14 RX ADMIN — OLANZAPINE 5 MILLIGRAM(S): 15 TABLET, FILM COATED ORAL at 11:50

## 2023-03-14 RX ADMIN — HALOPERIDOL DECANOATE 0.5 MILLIGRAM(S): 100 INJECTION INTRAMUSCULAR at 17:08

## 2023-03-14 RX ADMIN — HEPARIN SODIUM 5000 UNIT(S): 5000 INJECTION INTRAVENOUS; SUBCUTANEOUS at 17:48

## 2023-03-14 RX ADMIN — HALOPERIDOL DECANOATE 0.5 MILLIGRAM(S): 100 INJECTION INTRAMUSCULAR at 06:55

## 2023-03-14 RX ADMIN — QUETIAPINE FUMARATE 50 MILLIGRAM(S): 200 TABLET, FILM COATED ORAL at 22:36

## 2023-03-14 NOTE — PROGRESS NOTE ADULT - SUBJECTIVE AND OBJECTIVE BOX
Patient is a 89y old  Female who presents with a chief complaint of AMS 2/2 progressive dementia (14 Mar 2023 07:12)       INTERVAL HPI/OVERNIGHT EVENTS: Seen and examined at bedside. s/p code gray overnight for agitation per the resident team. Confused but alert     MEDICATIONS  (STANDING):  diltiazem    milliGRAM(s) Oral daily  haloperidol    Injectable 0.5 milliGRAM(s) IntraMuscular once  heparin   Injectable 5000 Unit(s) SubCutaneous every 12 hours  levothyroxine 75 MICROGram(s) Oral daily  mirtazapine 15 milliGRAM(s) Oral at bedtime  pantoprazole    Tablet 40 milliGRAM(s) Oral before breakfast  QUEtiapine 25 milliGRAM(s) Oral at bedtime  sertraline 150 milliGRAM(s) Oral daily  sodium chloride 0.9%. 1000 milliLiter(s) (60 mL/Hr) IV Continuous <Continuous>    MEDICATIONS  (PRN):  acetaminophen     Tablet .. 650 milliGRAM(s) Oral every 6 hours PRN Temp greater or equal to 38C (100.4F), Mild Pain (1 - 3)  melatonin 3 milliGRAM(s) Oral at bedtime PRN Insomnia  ondansetron    Tablet 4 milliGRAM(s) Oral every 6 hours PRN Nausea and/or Vomiting  QUEtiapine 12.5 milliGRAM(s) Oral daily PRN for agitation in am  QUEtiapine 12.5 milliGRAM(s) Oral once PRN Agitation      Allergies    Keflex (Other)    Intolerances        REVIEW OF SYSTEMS:  Unable to obtain, confused   Vital Signs Last 24 Hrs  T(C): 36.7 (14 Mar 2023 05:18), Max: 37.4 (13 Mar 2023 20:42)  T(F): 98 (14 Mar 2023 05:18), Max: 99.3 (13 Mar 2023 20:42)  HR: 71 (14 Mar 2023 05:18) (63 - 75)  BP: 120/72 (14 Mar 2023 05:18) (116/70 - 142/74)  BP(mean): --  RR: 17 (14 Mar 2023 05:18) (17 - 18)  SpO2: 92% (14 Mar 2023 05:18) (63% - 92%)    Parameters below as of 14 Mar 2023 05:18  Patient On (Oxygen Delivery Method): nasal cannula        PHYSICAL EXAM:  GENERAL: NAD, confused  HEAD:  Atraumatic, Normocephalic  EYES: EOMI, PERRLA, conjunctiva and sclera clear  ENMT: No tonsillar erythema, exudates, or enlargement  NECK: Supple, No JVD, Normal thyroid  NERVOUS SYSTEM:  confused at baseline due to dementia   CHEST/LUNG: Clear to auscultation bilaterally; No rales, rhonchi, wheezing, or rubs  HEART: S1S2+, Regular rate and rhythm  ABDOMEN: Soft, Nontender, Nondistended; Bowel sounds present  EXTREMITIES:  2+ Peripheral Pulses, No clubbing, cyanosis  LYMPH: No lymphadenopathy noted  SKIN: No rashes or lesions      LABS:      Ca    9.1        13 Mar 2023 07:22        CAPILLARY BLOOD GLUCOSE        BLOOD CULTURE    RADIOLOGY & ADDITIONAL TESTS:    Imaging Personally Reviewed:  [ ] YES     Consultant(s) Notes Reviewed:      Care Discussed with Consultants/Other Providers:

## 2023-03-14 NOTE — PROGRESS NOTE ADULT - PROBLEM SELECTOR PLAN 2
Patient with poor PO intake, BUN 24, Cr 1.4  - Continue home remeron, sertraline  - encourage po intake   - Aspiration precautions
Patient with poor PO intake, BUN 24, Cr 1.4  - Continue home remeron, sertraline  - encourage po intake   - Aspiration precautions

## 2023-03-14 NOTE — PROGRESS NOTE ADULT - PROBLEM SELECTOR PROBLEM 3
Patient called reporting that her legs have been giving out more recently  Continues to endorse good pain relief, she has good strength, but at times her legs will buckle and she will feel weak  A counseled the patient secondary to her severe central canal stenosis at L4-L5, it is recommended that she seek surgical consultation for decompression  I counseled her not to operate heavy machinery or drive given her current condition  I will see her back in clinic to evaluate her for orthosis/cane/walker  In the meantime we will ensure that she has appointment for surgical consultation with Dr Pura Le today 
UTI (urinary tract infection)
UTI (urinary tract infection)

## 2023-03-14 NOTE — PROGRESS NOTE ADULT - PROBLEM SELECTOR PLAN 4
JIMENA likely 2/2 pre-renal azotemia in the setting of dehydration, poor PO intake  - Baseline creatinine 1.0  - Creatinine improved   - HOLD lasix  - S/p IV fluids   - Monitor BMP  - Avoid nephrotoxic medications as able
JIMENA likely 2/2 pre-renal azotemia in the setting of dehydration, poor PO intake  - Baseline creatinine 1.0  - Creatinine currently 1.4  - HOLD lasix  - S/p IV fluids   - Monitor BMP  - Avoid nephrotoxic medications as able

## 2023-03-14 NOTE — BH CONSULTATION LIAISON PROGRESS NOTE - NSBHCONSULTRECOMMENDOTHER_PSY_A_CORE FT
Spoke to patient's daughter and son. Education and support provided.
Spoke to patient's daughter and son. Education and support provided.

## 2023-03-14 NOTE — BH CONSULTATION LIAISON PROGRESS NOTE - NSBHATTESTBILLING_PSY_A_CORE
22241-Todsiolmhq OBS or IP - low complexity OR 25-34 mins
62858-Gzfojmpryb OBS or IP - low complexity OR 25-34 mins

## 2023-03-14 NOTE — PROGRESS NOTE ADULT - ASSESSMENT
89 yo F with PMH dementia, Afib not on AC, hypothyroidism, R total hip replacement, GERD, HTN admitted for AMS/agitation

## 2023-03-14 NOTE — BH CONSULTATION LIAISON PROGRESS NOTE - NSBHMSEKNOWHOW_PSY_ALL_CORE
Patient instructed on the following:   - Wear thumb spica brace while at work and at home w/ household management tasks such as cleaning, cooking, and yard work for additional 4 weeks   - Apply ice pack to R thumb 1-2 x daily, 10 minutes per application for 2-3 weeks   - Perform gentle thumb extension stretches and self-massage thenar region to decrease overall thumb stiffness and improve AROM- 1-2 x daily  
Current Events
Current Events

## 2023-03-14 NOTE — CHART NOTE - NSCHARTNOTEFT_GEN_A_CORE
Care discussed with son and daughter, they feel seroquel has helped patient sleep at facility, and without it has been combative, but they are concerned about the sedating effects.  Patient also becomes combative during the day, and needs an agent for am as well, but there is concern about sedation.  They want to speak to Dr. Cifuentes, and possible transfer to [facility].  And want to speak to Social Work about placement options.    Cedric Javier, Attending Physician
Informed by RN that patient was refusing CT Head scan. Patient already received multiple anxiolytics throughout the day, but still remains uncooperative. Given side effects of further sedation, will hold off CT scan tonight and may re-attempt tomorrow. Night Attending made aware.
RN reported patient agitated and received Zyprexa IM 3 hours ago. I spoke to  Psych, ok to give Haldol 0.5mg IM X 1. Constant observation also ordered

## 2023-03-14 NOTE — BH CONSULTATION LIAISON PROGRESS NOTE - NSBHCHARTREVIEWLAB_PSY_A_CORE FT
12.3   6.35  )-----------( 216      ( 13 Mar 2023 07:22 )             37.3   03-13    135  |  105  |  20  ----------------------------<  88  4.3   |  27  |  1.20    Ca    9.1      13 Mar 2023 07:22    TPro  6.9  /  Alb  3.2<L>  /  TBili  0.3  /  DBili  x   /  AST  25  /  ALT  32  /  AlkPhos  53  03-13  
                      13.1   5.34  )-----------( 233      ( 14 Mar 2023 08:19 )             38.0   03-14    132<L>  |  102  |  18  ----------------------------<  112<H>  4.4   |  23  |  1.20    Ca    8.7      14 Mar 2023 08:19    TPro  6.9  /  Alb  3.2<L>  /  TBili  0.3  /  DBili  x   /  AST  25  /  ALT  32  /  AlkPhos  53  03-13

## 2023-03-14 NOTE — PROGRESS NOTE ADULT - PROBLEM SELECTOR PLAN 7
Chronic  - Continue home medications: pantoprazole
Chronic  - Continue home medications: pantoprazole

## 2023-03-14 NOTE — PROGRESS NOTE ADULT - PROBLEM SELECTOR PLAN 3
Completed course of antibiotics
Patient was previously admitted for PLV from 3/4 through 3/7, found to have UTI, positive for Klebsiella on Ucx  - Received 3d ceftriaxone, was discharged on TMP-SMX PO through 3/13  - Continue PO TMP-SMX, last day 3/13  - F/u UA

## 2023-03-14 NOTE — BH CONSULTATION LIAISON PROGRESS NOTE - NSBHCHARTREVIEWVS_PSY_A_CORE FT
Vital Signs Last 24 Hrs  T(C): 36.6 (13 Mar 2023 05:18), Max: 36.7 (12 Mar 2023 15:20)  T(F): 97.9 (13 Mar 2023 05:18), Max: 98.1 (12 Mar 2023 20:24)  HR: 73 (13 Mar 2023 05:18) (64 - 77)  BP: 176/74 (13 Mar 2023 05:18) (98/58 - 176/74)  BP(mean): --  RR: 18 (13 Mar 2023 05:18) (16 - 18)  SpO2: 93% (13 Mar 2023 05:18) (91% - 94%)    Parameters below as of 13 Mar 2023 05:18  Patient On (Oxygen Delivery Method): nasal cannula  O2 Flow (L/min): 2  
Vital Signs Last 24 Hrs  T(C): 36.7 (14 Mar 2023 05:18), Max: 37.4 (13 Mar 2023 20:42)  T(F): 98 (14 Mar 2023 05:18), Max: 99.3 (13 Mar 2023 20:42)  HR: 71 (14 Mar 2023 05:18) (63 - 75)  BP: 120/72 (14 Mar 2023 05:18) (116/70 - 142/74)  BP(mean): --  RR: 17 (14 Mar 2023 05:18) (17 - 18)  SpO2: 92% (14 Mar 2023 05:18) (63% - 92%)    Parameters below as of 14 Mar 2023 05:18  Patient On (Oxygen Delivery Method): nasal cannula

## 2023-03-14 NOTE — PROGRESS NOTE ADULT - PROBLEM SELECTOR PLAN 1
Patient presents with AMS, increased agitation  3/10/23 secondary to progressive dementia vs less likely encephalopathy due to recent uti   - Senior living facility and son having difficulty caring for patient even with the help of HHA  - No current signs/symptoms of infection  - Received in ED seroquel 25mg x1  - Seroquel 25mg qd  - Psych Dr. Cifuentes consulted. Requested follow up for today   -Zyprexa PRN agitation ordered.  -Fall precautions
Patient presents with AMS, increased agitation since 3/10/23 secondary to progressive dementia vs less likely encephalopathy due to recent uti   - Senior living facility and son having difficulty caring for patient even with the help of HHA  - No current signs/symptoms of infection  - Received in ED seroquel 25mg x1  - Seroquel 25mg qd  - Psych Dr. Cifuentes consulted  -Zyprexa PRN agitation ordered.  -Fall precautions

## 2023-03-14 NOTE — BH CONSULTATION LIAISON PROGRESS NOTE - [id number]_PSY_A_CORE
Patient seen, evaluated and chart reviewed. Patient was reportedly very agitated this morning, exhibiting combative behavior, throwing phone at the staff. Patient was given Haldol PRN and currently is calm and cooperative, although pleasantly confused. Patient had no obvious cause of delirium. Currently lucid, no evidence of psychosis, jose raul or depression.
Patient seen, evaluated and chart reviewed. Patient was recently readmitted to the hospital recently as she became confused and agitated again. Patient had no obvious cause of delirium. Currently lucid, no evidence of psychosis, jose raul or depression.

## 2023-03-14 NOTE — BH CONSULTATION LIAISON PROGRESS NOTE - CURRENT MEDICATION
MEDICATIONS  (STANDING):  diltiazem    milliGRAM(s) Oral daily  heparin   Injectable 5000 Unit(s) SubCutaneous every 12 hours  levothyroxine 75 MICROGram(s) Oral daily  mirtazapine 15 milliGRAM(s) Oral at bedtime  pantoprazole    Tablet 40 milliGRAM(s) Oral before breakfast  QUEtiapine 25 milliGRAM(s) Oral at bedtime  sertraline 150 milliGRAM(s) Oral daily  sodium chloride 0.9%. 1000 milliLiter(s) (60 mL/Hr) IV Continuous <Continuous>    MEDICATIONS  (PRN):  acetaminophen     Tablet .. 650 milliGRAM(s) Oral every 6 hours PRN Temp greater or equal to 38C (100.4F), Mild Pain (1 - 3)  haloperidol    Injectable 0.5 milliGRAM(s) IntraMuscular once PRN Agitation  melatonin 3 milliGRAM(s) Oral at bedtime PRN Insomnia  OLANZapine Injectable 2.5 milliGRAM(s) IntraMuscular once PRN agitation  ondansetron    Tablet 4 milliGRAM(s) Oral every 6 hours PRN Nausea and/or Vomiting  QUEtiapine 12.5 milliGRAM(s) Oral daily PRN for agitation in am  QUEtiapine 12.5 milliGRAM(s) Oral once PRN Agitation  
MEDICATIONS  (STANDING):  diltiazem    milliGRAM(s) Oral daily  heparin   Injectable 5000 Unit(s) SubCutaneous every 12 hours  levothyroxine 75 MICROGram(s) Oral daily  mirtazapine 15 milliGRAM(s) Oral at bedtime  pantoprazole    Tablet 40 milliGRAM(s) Oral before breakfast  QUEtiapine 50 milliGRAM(s) Oral at bedtime  sertraline 150 milliGRAM(s) Oral daily  sodium chloride 0.9%. 1000 milliLiter(s) (60 mL/Hr) IV Continuous <Continuous>    MEDICATIONS  (PRN):  acetaminophen     Tablet .. 650 milliGRAM(s) Oral every 6 hours PRN Temp greater or equal to 38C (100.4F), Mild Pain (1 - 3)  melatonin 3 milliGRAM(s) Oral at bedtime PRN Insomnia  ondansetron    Tablet 4 milliGRAM(s) Oral every 6 hours PRN Nausea and/or Vomiting  QUEtiapine 12.5 milliGRAM(s) Oral daily PRN for agitation in am  QUEtiapine 12.5 milliGRAM(s) Oral once PRN Agitation

## 2023-03-14 NOTE — PROGRESS NOTE ADULT - PROBLEM SELECTOR PLAN 6
Chronic, 128/73 on admission  - Continue home medications diltiazem   - HOLD lasix   - Monitor routine hemodynamics
Chronic, 128/73 on admission  - Continue home medications diltiazem   - HOLD lasix   - Monitor routine hemodynamics

## 2023-03-14 NOTE — PROGRESS NOTE ADULT - PROBLEM SELECTOR PLAN 5
Chronic  Previous TSH 3.07 wnl as of 3/7  - Continue home levothyroxine
Chronic  Previous TSH 3.07 wnl as of 3/7  - Continue home levothyroxine

## 2023-03-15 LAB
ANION GAP SERPL CALC-SCNC: 7 MMOL/L — SIGNIFICANT CHANGE UP (ref 5–17)
BUN SERPL-MCNC: 16 MG/DL — SIGNIFICANT CHANGE UP (ref 7–23)
CALCIUM SERPL-MCNC: 9 MG/DL — SIGNIFICANT CHANGE UP (ref 8.5–10.1)
CHLORIDE SERPL-SCNC: 101 MMOL/L — SIGNIFICANT CHANGE UP (ref 96–108)
CO2 SERPL-SCNC: 26 MMOL/L — SIGNIFICANT CHANGE UP (ref 22–31)
CREAT SERPL-MCNC: 1 MG/DL — SIGNIFICANT CHANGE UP (ref 0.5–1.3)
EGFR: 52 ML/MIN/1.73M2 — LOW
GLUCOSE SERPL-MCNC: 115 MG/DL — HIGH (ref 70–99)
HCT VFR BLD CALC: 40.9 % — SIGNIFICANT CHANGE UP (ref 34.5–45)
HGB BLD-MCNC: 13.4 G/DL — SIGNIFICANT CHANGE UP (ref 11.5–15.5)
MCHC RBC-ENTMCNC: 31 PG — SIGNIFICANT CHANGE UP (ref 27–34)
MCHC RBC-ENTMCNC: 32.8 GM/DL — SIGNIFICANT CHANGE UP (ref 32–36)
MCV RBC AUTO: 94.7 FL — SIGNIFICANT CHANGE UP (ref 80–100)
NRBC # BLD: 0 /100 WBCS — SIGNIFICANT CHANGE UP (ref 0–0)
PLATELET # BLD AUTO: 258 K/UL — SIGNIFICANT CHANGE UP (ref 150–400)
POTASSIUM SERPL-MCNC: 4.3 MMOL/L — SIGNIFICANT CHANGE UP (ref 3.5–5.3)
POTASSIUM SERPL-SCNC: 4.3 MMOL/L — SIGNIFICANT CHANGE UP (ref 3.5–5.3)
RBC # BLD: 4.32 M/UL — SIGNIFICANT CHANGE UP (ref 3.8–5.2)
RBC # FLD: 13.2 % — SIGNIFICANT CHANGE UP (ref 10.3–14.5)
SODIUM SERPL-SCNC: 134 MMOL/L — LOW (ref 135–145)
WBC # BLD: 7.09 K/UL — SIGNIFICANT CHANGE UP (ref 3.8–10.5)
WBC # FLD AUTO: 7.09 K/UL — SIGNIFICANT CHANGE UP (ref 3.8–10.5)

## 2023-03-15 PROCEDURE — 99232 SBSQ HOSP IP/OBS MODERATE 35: CPT

## 2023-03-15 RX ADMIN — SERTRALINE 150 MILLIGRAM(S): 25 TABLET, FILM COATED ORAL at 18:59

## 2023-03-15 RX ADMIN — OLANZAPINE 5 MILLIGRAM(S): 15 TABLET, FILM COATED ORAL at 00:59

## 2023-03-15 RX ADMIN — PANTOPRAZOLE SODIUM 40 MILLIGRAM(S): 20 TABLET, DELAYED RELEASE ORAL at 05:22

## 2023-03-15 RX ADMIN — QUETIAPINE FUMARATE 50 MILLIGRAM(S): 200 TABLET, FILM COATED ORAL at 22:13

## 2023-03-15 RX ADMIN — Medication 300 MILLIGRAM(S): at 05:23

## 2023-03-15 RX ADMIN — HEPARIN SODIUM 5000 UNIT(S): 5000 INJECTION INTRAVENOUS; SUBCUTANEOUS at 18:58

## 2023-03-15 RX ADMIN — MIRTAZAPINE 15 MILLIGRAM(S): 45 TABLET, ORALLY DISINTEGRATING ORAL at 22:13

## 2023-03-15 RX ADMIN — HEPARIN SODIUM 5000 UNIT(S): 5000 INJECTION INTRAVENOUS; SUBCUTANEOUS at 05:22

## 2023-03-15 RX ADMIN — QUETIAPINE FUMARATE 12.5 MILLIGRAM(S): 200 TABLET, FILM COATED ORAL at 05:22

## 2023-03-15 RX ADMIN — Medication 75 MICROGRAM(S): at 05:23

## 2023-03-15 NOTE — PROGRESS NOTE ADULT - SUBJECTIVE AND OBJECTIVE BOX
CC/F/U for:     HPI:  89 yo F with PMH dementia, Afib not on AC, hypothyroidism, R total hip replacement, GERD, HTN presents to the ED with AMS. Patient combative and uncooperative at time of admission. Son Clarke present at bedside to provide history, Son reports patient was discharged on 3/7 Tuesday and was mostly cooperative, at her mental baseline until about Friday 3/10. Son reports that she has intermittent episodes of agitation and confusion. Today, patient thought she was in [place], called her children and grandchildren every 10 minutes. Patient has been verbally abusive to her HHA. [facility] reported that patient was yelling and agitated in the library.     Patient was recently hospitalized at [facility] from 3/4 through 3/7/2023 for AMS, found to have UTI. Patient has 3 more tablets left of PO TMP-SMX. Last dose taken was this AM.     ED Course:   Vitals: BP: 128/73, HR: 71, Temp: 97.7, RR: 20, SpO2: 95% on RA  Labs:  Na 132, AG 4, BUN 24, Cr 1.4, Gluc 107, eGFR 35  UA: pending  Received in the ED quetiapine 25mg x1   (12 Mar 2023 01:10)        INTERVAL HPI/OVERNIGHT EVENTS:  Pt seen and examined at bedside.     Allergies/Intolerance: Keflex (Other)      MEDICATIONS  (STANDING):  diltiazem    milliGRAM(s) Oral daily  heparin   Injectable 5000 Unit(s) SubCutaneous every 12 hours  levothyroxine 75 MICROGram(s) Oral daily  mirtazapine 15 milliGRAM(s) Oral at bedtime  pantoprazole    Tablet 40 milliGRAM(s) Oral before breakfast  QUEtiapine 50 milliGRAM(s) Oral at bedtime  sertraline 150 milliGRAM(s) Oral daily  sodium chloride 0.9%. 1000 milliLiter(s) (60 mL/Hr) IV Continuous <Continuous>    MEDICATIONS  (PRN):  acetaminophen     Tablet .. 650 milliGRAM(s) Oral every 6 hours PRN Temp greater or equal to 38C (100.4F), Mild Pain (1 - 3)  melatonin 3 milliGRAM(s) Oral at bedtime PRN Insomnia  OLANZapine Injectable 5 milliGRAM(s) IntraMuscular every 12 hours PRN agitation and combativeness  ondansetron    Tablet 4 milliGRAM(s) Oral every 6 hours PRN Nausea and/or Vomiting  QUEtiapine 12.5 milliGRAM(s) Oral daily PRN for agitation in am        ROS: as above; all other systems reviewed and wnl      PHYSICAL EXAMINATION:  Vital Signs Last 24 Hrs  T(C): 36.5 (15 Mar 2023 04:39), Max: 36.6 (14 Mar 2023 19:03)  T(F): 97.7 (15 Mar 2023 04:39), Max: 97.8 (14 Mar 2023 19:03)  HR: 71 (15 Mar 2023 04:39) (71 - 85)  BP: 126/73 (15 Mar 2023 04:39) (110/76 - 126/73)  BP(mean): --  RR: 18 (15 Mar 2023 04:39) (18 - 18)  SpO2: 92% (15 Mar 2023 04:39) (92% - 96%)    Parameters below as of 15 Mar 2023 04:39  Patient On (Oxygen Delivery Method): room air      CAPILLARY BLOOD GLUCOSE          GENERAL: NAD  HEENT: mucous membranes dry  RESP: respirations unlabored  HEART: HR s  ABDOMEN: soft, ND, NT   EXTREMITIES:  no edema b/l LEs, no calf tenderness  NEURO: awake, alert, interactive; moves all extremities      LABS:                        13.4   7.09  )-----------( 258      ( 15 Mar 2023 08:43 )             40.9     03-15    134<L>  |  101  |  16  ----------------------------<  115<H>  4.3   |  26  |  1.00    Ca    9.0      15 Mar 2023 08:43              RADIOLOGY & ADDITIONAL TESTS:      ASSESSMENT AND PLAN:  89y Female CC/F/U for: AMS/agitation, dementia    HPI:  89 yo F with PMH dementia, Afib not on AC, hypothyroidism, R total hip replacement, GERD, HTN presents to the ED with AMS. Patient combative and uncooperative at time of admission. Son Clarke present at bedside to provide history, Son reports patient was discharged on 3/7 Tuesday and was mostly cooperative, at her mental baseline until about Friday 3/10. Son reports that she has intermittent episodes of agitation and confusion. Today, patient thought she was in [place], called her children and grandchildren every 10 minutes. Patient has been verbally abusive to her HHA. [facility] reported that patient was yelling and agitated in the library.     Patient was recently hospitalized at [facility] from 3/4 through 3/7/2023 for AMS, found to have UTI. Patient has 3 more tablets left of PO TMP-SMX. Last dose taken was this AM.     ED Course:   Vitals: BP: 128/73, HR: 71, Temp: 97.7, RR: 20, SpO2: 95% on RA  Labs:  Na 132, AG 4, BUN 24, Cr 1.4, Gluc 107, eGFR 35  UA: pending  Received in the ED quetiapine 25mg x1   (12 Mar 2023 01:10)        INTERVAL HPI/OVERNIGHT EVENTS:  Pt seen and examined at bedside - multiple IM doses antipsychotics for behavioral decompensation yesterday.     Allergies/Intolerance: Keflex (Other)      MEDICATIONS  (STANDING):  diltiazem    milliGRAM(s) Oral daily  heparin   Injectable 5000 Unit(s) SubCutaneous every 12 hours  levothyroxine 75 MICROGram(s) Oral daily  mirtazapine 15 milliGRAM(s) Oral at bedtime  pantoprazole    Tablet 40 milliGRAM(s) Oral before breakfast  QUEtiapine 50 milliGRAM(s) Oral at bedtime  sertraline 150 milliGRAM(s) Oral daily  sodium chloride 0.9%. 1000 milliLiter(s) (60 mL/Hr) IV Continuous <Continuous>    MEDICATIONS  (PRN):  acetaminophen     Tablet .. 650 milliGRAM(s) Oral every 6 hours PRN Temp greater or equal to 38C (100.4F), Mild Pain (1 - 3)  melatonin 3 milliGRAM(s) Oral at bedtime PRN Insomnia  OLANZapine Injectable 5 milliGRAM(s) IntraMuscular every 12 hours PRN agitation and combativeness  ondansetron    Tablet 4 milliGRAM(s) Oral every 6 hours PRN Nausea and/or Vomiting  QUEtiapine 12.5 milliGRAM(s) Oral daily PRN for agitation in am        ROS: uto      PHYSICAL EXAMINATION:  Vital Signs Last 24 Hrs  T(C): 36.5 (15 Mar 2023 04:39), Max: 36.6 (14 Mar 2023 19:03)  T(F): 97.7 (15 Mar 2023 04:39), Max: 97.8 (14 Mar 2023 19:03)  HR: 71 (15 Mar 2023 04:39) (71 - 85)  BP: 126/73 (15 Mar 2023 04:39) (110/76 - 126/73)  BP(mean): --  RR: 18 (15 Mar 2023 04:39) (18 - 18)  SpO2: 92% (15 Mar 2023 04:39) (92% - 96%)    Parameters below as of 15 Mar 2023 04:39  Patient On (Oxygen Delivery Method): room air      GENERAL: elderly female, in NAD  HEENT: mucous membranes dry  RESP: respirations unlabored  HEART: HR 70s  ABDOMEN: soft, ND  EXTREMITIES:  no edema b/l LEs  NEURO: awake, alert, interactive; moves all extremities      LABS:                        13.4   7.09  )-----------( 258      ( 15 Mar 2023 08:43 )             40.9     03-15    134<L>  |  101  |  16  ----------------------------<  115<H>  4.3   |  26  |  1.00    Ca    9.0      15 Mar 2023 08:43

## 2023-03-15 NOTE — PROGRESS NOTE ADULT - ASSESSMENT
89F, dementia, HTN, hypothy, Afib not on a/c; recent admit for UTI/behavioral - adm w/persistent behavioral disturbance with aggression/agitation  -CTH neg  -UA neg  -no e/o acute infection  -c/f progressing dementia - s/p IM zyprexa, haldol - eval by Neuro and Psych - continue prn and  standing seroquel, standing remeron  -hypothy - continue synthroid  -HTN - continue diltiazem per home meds  -dvt prophy    Lengthy discussion w/daughter Neela - all diagnoses/evaluations, mgmt plans discussed in detail; all questions answered

## 2023-03-15 NOTE — PROGRESS NOTE ADULT - SUBJECTIVE AND OBJECTIVE BOX
Neurology follow up note    TYRON [id number]      Interval History:    Patient feels ok no new complaints.    Allergies    Keflex (Other)    Intolerances        MEDICATIONS    acetaminophen     Tablet .. 650 milliGRAM(s) Oral every 6 hours PRN  diltiazem    milliGRAM(s) Oral daily  heparin   Injectable 5000 Unit(s) SubCutaneous every 12 hours  levothyroxine 75 MICROGram(s) Oral daily  melatonin 3 milliGRAM(s) Oral at bedtime PRN  mirtazapine 15 milliGRAM(s) Oral at bedtime  OLANZapine Injectable 5 milliGRAM(s) IntraMuscular every 12 hours PRN  ondansetron    Tablet 4 milliGRAM(s) Oral every 6 hours PRN  pantoprazole    Tablet 40 milliGRAM(s) Oral before breakfast  QUEtiapine 12.5 milliGRAM(s) Oral daily PRN  QUEtiapine 50 milliGRAM(s) Oral at bedtime  sertraline 150 milliGRAM(s) Oral daily  sodium chloride 0.9%. 1000 milliLiter(s) IV Continuous <Continuous>              Vital Signs Last 24 Hrs  T(C): 36.5 (15 Mar 2023 04:39), Max: 36.6 (14 Mar 2023 19:03)  T(F): 97.7 (15 Mar 2023 04:39), Max: 97.8 (14 Mar 2023 19:03)  HR: 71 (15 Mar 2023 04:39) (71 - 85)  BP: 126/73 (15 Mar 2023 04:39) (110/76 - 126/73)  BP(mean): --  RR: 18 (15 Mar 2023 04:39) (18 - 18)  SpO2: 92% (15 Mar 2023 04:39) (92% - 96%)    Parameters below as of 15 Mar 2023 04:39  Patient On (Oxygen Delivery Method): room air        REVIEW OF SYSTEMS:  Constitutional:  The patient denies fever, chills, or night sweats.  Head:  No headache.  Eyes:  No double vision or blurry vision.  Ears:  No ringing in the ears.  Neck:  No neck pain.  Respiratory:  No shortness of breath.  Cardiovascular:  No chest pain.  Abdomen:  No nausea, vomiting, or abdominal pain.  Extremities/Neurological:  No numbness or tingling.  Musculoskeletal:  No joint pain.    PHYSICAL EXAMINATION:  HEENT:  Head:  Normocephalic, atraumatic.  Eyes:  No scleral icterus.  Ears:  Hearing bilaterally appeared intact.  NECK:  Supple.  RESPIRATORY:  Good air entry bilaterally.  CARDIOVASCULAR:  S1 and S2 heard.  ABDOMEN:  Soft and nontender.  EXTREMITIES:  No clubbing or cyanosis was noted.      NEUROLOGIC:  The patient is awake and alert.  Location was hospital.    Was able to name simple objects.  Does suffer from underlying memory issues, dementia.  Extraocular movements were intact.  Speech was fluent.  Smile symmetric.  Motor:  Bilateral upper 4/5, bilateral lower 3+/5.  Was able to follow complex commands.                  LABS:  CBC Full  -  ( 14 Mar 2023 08:19 )  WBC Count : 5.34 K/uL  RBC Count : 4.11 M/uL  Hemoglobin : 13.1 g/dL  Hematocrit : 38.0 %  Platelet Count - Automated : 233 K/uL  Mean Cell Volume : 92.5 fl  Mean Cell Hemoglobin : 31.9 pg  Mean Cell Hemoglobin Concentration : 34.5 gm/dL  Auto Neutrophil # : x  Auto Lymphocyte # : x  Auto Monocyte # : x  Auto Eosinophil # : x  Auto Basophil # : x  Auto Neutrophil % : x  Auto Lymphocyte % : x  Auto Monocyte % : x  Auto Eosinophil % : x  Auto Basophil % : x      03-14    132<L>  |  102  |  18  ----------------------------<  112<H>  4.4   |  23  |  1.20    Ca    8.7      14 Mar 2023 08:19      Hemoglobin A1C:       Vitamin B12 Vitamin B12, Serum: 560 pg/mL (03-14 @ 08:19)          RADIOLOGY      ANALYSIS AND PLAN:  This is a 89-year-old with episode of altered mental status.  For altered mental status most likely secondary to dementia becoming more prominent in the hospital setting, suspect less likely this is a primary central nervous system event.  For dementia with agitation, would recommend Psychiatry followup with adjustment of medication as needed.  For history of dementia   For history of hypothyroidism, continue the patient on Synthroid.    Spoke with daughter, Poly, at [phone] 3/14.  Attempted to contact son, Clarke, at [phone],     Greater than 40 minutes of time was spent with the patient, plan of care, reviewing data, with greater than 50% of the visit was spent counseling and/or coordinating care with multidisciplinary healthcare team         Neurology follow up note    TYRON [id number]      Interval History:    Patient events noted was agitated had poor sleep last night     Allergies    Keflex (Other)    Intolerances        MEDICATIONS    acetaminophen     Tablet .. 650 milliGRAM(s) Oral every 6 hours PRN  diltiazem    milliGRAM(s) Oral daily  heparin   Injectable 5000 Unit(s) SubCutaneous every 12 hours  levothyroxine 75 MICROGram(s) Oral daily  melatonin 3 milliGRAM(s) Oral at bedtime PRN  mirtazapine 15 milliGRAM(s) Oral at bedtime  OLANZapine Injectable 5 milliGRAM(s) IntraMuscular every 12 hours PRN  ondansetron    Tablet 4 milliGRAM(s) Oral every 6 hours PRN  pantoprazole    Tablet 40 milliGRAM(s) Oral before breakfast  QUEtiapine 12.5 milliGRAM(s) Oral daily PRN  QUEtiapine 50 milliGRAM(s) Oral at bedtime  sertraline 150 milliGRAM(s) Oral daily  sodium chloride 0.9%. 1000 milliLiter(s) IV Continuous <Continuous>              Vital Signs Last 24 Hrs  T(C): 36.5 (15 Mar 2023 04:39), Max: 36.6 (14 Mar 2023 19:03)  T(F): 97.7 (15 Mar 2023 04:39), Max: 97.8 (14 Mar 2023 19:03)  HR: 71 (15 Mar 2023 04:39) (71 - 85)  BP: 126/73 (15 Mar 2023 04:39) (110/76 - 126/73)  BP(mean): --  RR: 18 (15 Mar 2023 04:39) (18 - 18)  SpO2: 92% (15 Mar 2023 04:39) (92% - 96%)    Parameters below as of 15 Mar 2023 04:39  Patient On (Oxygen Delivery Method): room air        REVIEW OF SYSTEMS:  Constitutional:  The patient denies fever, chills, or night sweats.  Head:  No headache.  Eyes:  No double vision or blurry vision.  Ears:  No ringing in the ears.  Neck:  No neck pain.  Respiratory:  No shortness of breath.  Cardiovascular:  No chest pain.  Abdomen:  No nausea, vomiting, or abdominal pain.  Extremities/Neurological:  No numbness or tingling.  Musculoskeletal:  No joint pain.    PHYSICAL EXAMINATION:  HEENT:  Head:  Normocephalic, atraumatic.  Eyes:  No scleral icterus.  Ears:  Hearing bilaterally appeared intact.  NECK:  Supple.  RESPIRATORY:  Good air entry bilaterally.  CARDIOVASCULAR:  S1 and S2 heard.  ABDOMEN:  Soft and nontender.  EXTREMITIES:  No clubbing or cyanosis was noted.      NEUROLOGIC:  The patient is sleepy.  Does suffer from underlying memory issues, dementia.  Extraocular movements were intact.  Speech was fluent.  Smile symmetric.  Motor:  Bilateral upper 4/5, bilateral lower 3+/5.  Was able to follow complex commands.                  LABS:  CBC Full  -  ( 14 Mar 2023 08:19 )  WBC Count : 5.34 K/uL  RBC Count : 4.11 M/uL  Hemoglobin : 13.1 g/dL  Hematocrit : 38.0 %  Platelet Count - Automated : 233 K/uL  Mean Cell Volume : 92.5 fl  Mean Cell Hemoglobin : 31.9 pg  Mean Cell Hemoglobin Concentration : 34.5 gm/dL  Auto Neutrophil # : x  Auto Lymphocyte # : x  Auto Monocyte # : x  Auto Eosinophil # : x  Auto Basophil # : x  Auto Neutrophil % : x  Auto Lymphocyte % : x  Auto Monocyte % : x  Auto Eosinophil % : x  Auto Basophil % : x      03-14    132<L>  |  102  |  18  ----------------------------<  112<H>  4.4   |  23  |  1.20    Ca    8.7      14 Mar 2023 08:19      Hemoglobin A1C:       Vitamin B12 Vitamin B12, Serum: 560 pg/mL (03-14 @ 08:19)          RADIOLOGY      ANALYSIS AND PLAN:  This is a 89-year-old with episode of altered mental status.  For altered mental status most likely secondary to dementia becoming more prominent in the hospital setting, suspect less likely this is a primary central nervous system event.  For dementia with agitation, would recommend Psychiatry followup with adjustment of medication as needed.  For history of dementia   For history of hypothyroidism, continue the patient on Synthroid.    Spoke with daughter, Poly, at [phone] 3/14.  Attempted to contact son, Clarke, at [phone],     Greater than 40 minutes of time was spent with the patient, plan of care, reviewing data, with greater than 50% of the visit was spent counseling and/or coordinating care with multidisciplinary healthcare team

## 2023-03-16 LAB
ANION GAP SERPL CALC-SCNC: 3 MMOL/L — LOW (ref 5–17)
BUN SERPL-MCNC: 21 MG/DL — SIGNIFICANT CHANGE UP (ref 7–23)
CALCIUM SERPL-MCNC: 8.8 MG/DL — SIGNIFICANT CHANGE UP (ref 8.5–10.1)
CHLORIDE SERPL-SCNC: 106 MMOL/L — SIGNIFICANT CHANGE UP (ref 96–108)
CO2 SERPL-SCNC: 25 MMOL/L — SIGNIFICANT CHANGE UP (ref 22–31)
CREAT SERPL-MCNC: 1.1 MG/DL — SIGNIFICANT CHANGE UP (ref 0.5–1.3)
EGFR: 46 ML/MIN/1.73M2 — LOW
GLUCOSE SERPL-MCNC: 89 MG/DL — SIGNIFICANT CHANGE UP (ref 70–99)
POTASSIUM SERPL-MCNC: 4.5 MMOL/L — SIGNIFICANT CHANGE UP (ref 3.5–5.3)
POTASSIUM SERPL-SCNC: 4.5 MMOL/L — SIGNIFICANT CHANGE UP (ref 3.5–5.3)
SODIUM SERPL-SCNC: 134 MMOL/L — LOW (ref 135–145)

## 2023-03-16 PROCEDURE — 99232 SBSQ HOSP IP/OBS MODERATE 35: CPT

## 2023-03-16 RX ADMIN — HEPARIN SODIUM 5000 UNIT(S): 5000 INJECTION INTRAVENOUS; SUBCUTANEOUS at 05:03

## 2023-03-16 RX ADMIN — MIRTAZAPINE 15 MILLIGRAM(S): 45 TABLET, ORALLY DISINTEGRATING ORAL at 22:22

## 2023-03-16 RX ADMIN — Medication 300 MILLIGRAM(S): at 05:03

## 2023-03-16 RX ADMIN — SERTRALINE 150 MILLIGRAM(S): 25 TABLET, FILM COATED ORAL at 12:32

## 2023-03-16 RX ADMIN — PANTOPRAZOLE SODIUM 40 MILLIGRAM(S): 20 TABLET, DELAYED RELEASE ORAL at 05:02

## 2023-03-16 RX ADMIN — Medication 75 MICROGRAM(S): at 05:02

## 2023-03-16 NOTE — PROGRESS NOTE ADULT - SUBJECTIVE AND OBJECTIVE BOX
Neurology follow up note    TYRON [id number]      Interval History:    Patient feels ok no new complaints.    Allergies    Keflex (Other)    Intolerances        MEDICATIONS    acetaminophen     Tablet .. 650 milliGRAM(s) Oral every 6 hours PRN  diltiazem    milliGRAM(s) Oral daily  heparin   Injectable 5000 Unit(s) SubCutaneous every 12 hours  levothyroxine 75 MICROGram(s) Oral daily  melatonin 3 milliGRAM(s) Oral at bedtime PRN  mirtazapine 15 milliGRAM(s) Oral at bedtime  OLANZapine Injectable 5 milliGRAM(s) IntraMuscular every 12 hours PRN  ondansetron    Tablet 4 milliGRAM(s) Oral every 6 hours PRN  pantoprazole    Tablet 40 milliGRAM(s) Oral before breakfast  QUEtiapine 12.5 milliGRAM(s) Oral daily PRN  QUEtiapine 50 milliGRAM(s) Oral at bedtime  sertraline 150 milliGRAM(s) Oral daily  sodium chloride 0.9%. 1000 milliLiter(s) IV Continuous <Continuous>              Vital Signs Last 24 Hrs  T(C): 36.3 (16 Mar 2023 05:08), Max: 36.6 (15 Mar 2023 21:48)  T(F): 97.3 (16 Mar 2023 05:08), Max: 97.8 (15 Mar 2023 21:48)  HR: 65 (16 Mar 2023 05:08) (65 - 67)  BP: 111/66 (16 Mar 2023 05:08) (111/66 - 134/78)  BP(mean): --  RR: 18 (16 Mar 2023 05:08) (18 - 18)  SpO2: 92% (16 Mar 2023 05:08) (92% - 92%)    Parameters below as of 16 Mar 2023 05:08  Patient On (Oxygen Delivery Method): room air    REVIEW OF SYSTEMS:  Constitutional:  The patient denies fever, chills, or night sweats.  Head:  No headache.  Eyes:  No double vision or blurry vision.  Ears:  No ringing in the ears.  Neck:  No neck pain.  Respiratory:  No shortness of breath.  Cardiovascular:  No chest pain.  Abdomen:  No nausea, vomiting, or abdominal pain.  Extremities/Neurological:  No numbness or tingling.  Musculoskeletal:  No joint pain.    PHYSICAL EXAMINATION:  HEENT:  Head:  Normocephalic, atraumatic.  Eyes:  No scleral icterus.  Ears:  Hearing bilaterally appeared intact.  NECK:  Supple.  RESPIRATORY:  Good air entry bilaterally.  CARDIOVASCULAR:  S1 and S2 heard.  ABDOMEN:  Soft and nontender.  EXTREMITIES:  No clubbing or cyanosis was noted.      NEUROLOGIC:  The patient is sleepy.  Does suffer from underlying memory issues, dementia.  Extraocular movements were intact.  Speech was fluent.  Smile symmetric.  Motor:  Bilateral upper 4/5, bilateral lower 3+/5.  Was able to follow complex commands.                      LABS:  CBC Full  -  ( 15 Mar 2023 08:43 )  WBC Count : 7.09 K/uL  RBC Count : 4.32 M/uL  Hemoglobin : 13.4 g/dL  Hematocrit : 40.9 %  Platelet Count - Automated : 258 K/uL  Mean Cell Volume : 94.7 fl  Mean Cell Hemoglobin : 31.0 pg  Mean Cell Hemoglobin Concentration : 32.8 gm/dL  Auto Neutrophil # : x  Auto Lymphocyte # : x  Auto Monocyte # : x  Auto Eosinophil # : x  Auto Basophil # : x  Auto Neutrophil % : x  Auto Lymphocyte % : x  Auto Monocyte % : x  Auto Eosinophil % : x  Auto Basophil % : x      03-15    134<L>  |  101  |  16  ----------------------------<  115<H>  4.3   |  26  |  1.00    Ca    9.0      15 Mar 2023 08:43      Hemoglobin A1C:       Vitamin B12         RADIOLOGY      ANALYSIS AND PLAN:  This is a 89-year-old with episode of altered mental status.  For altered mental status most likely secondary to dementia becoming more prominent in the hospital setting, suspect less likely this is a primary central nervous system event.  For dementia with agitation, would recommend Psychiatry followup with adjustment of medication as needed.  For history of dementia   For history of hypothyroidism, continue the patient on Synthroid.    Spoke with daughter, Poly, at [phone] 3/14.  Attempted to contact son, Clarke, at [phone],     Greater than 40 minutes of time was spent with the patient, plan of care, reviewing data, with greater than 50% of the visit was spent counseling and/or coordinating care with multidisciplinary healthcare team     Neurology follow up note    TYRON [id number]      Interval History:    Patient feels ok no new complaints.    Allergies    Keflex (Other)    Intolerances        MEDICATIONS    acetaminophen     Tablet .. 650 milliGRAM(s) Oral every 6 hours PRN  diltiazem    milliGRAM(s) Oral daily  heparin   Injectable 5000 Unit(s) SubCutaneous every 12 hours  levothyroxine 75 MICROGram(s) Oral daily  melatonin 3 milliGRAM(s) Oral at bedtime PRN  mirtazapine 15 milliGRAM(s) Oral at bedtime  OLANZapine Injectable 5 milliGRAM(s) IntraMuscular every 12 hours PRN  ondansetron    Tablet 4 milliGRAM(s) Oral every 6 hours PRN  pantoprazole    Tablet 40 milliGRAM(s) Oral before breakfast  QUEtiapine 12.5 milliGRAM(s) Oral daily PRN  QUEtiapine 50 milliGRAM(s) Oral at bedtime  sertraline 150 milliGRAM(s) Oral daily  sodium chloride 0.9%. 1000 milliLiter(s) IV Continuous <Continuous>              Vital Signs Last 24 Hrs  T(C): 36.3 (16 Mar 2023 05:08), Max: 36.6 (15 Mar 2023 21:48)  T(F): 97.3 (16 Mar 2023 05:08), Max: 97.8 (15 Mar 2023 21:48)  HR: 65 (16 Mar 2023 05:08) (65 - 67)  BP: 111/66 (16 Mar 2023 05:08) (111/66 - 134/78)  BP(mean): --  RR: 18 (16 Mar 2023 05:08) (18 - 18)  SpO2: 92% (16 Mar 2023 05:08) (92% - 92%)    Parameters below as of 16 Mar 2023 05:08  Patient On (Oxygen Delivery Method): room air    REVIEW OF SYSTEMS:  Constitutional:  The patient denies fever, chills, or night sweats.  Head:  No headache.  Eyes:  No double vision or blurry vision.  Ears:  No ringing in the ears.  Neck:  No neck pain.  Respiratory:  No shortness of breath.  Cardiovascular:  No chest pain.  Abdomen:  No nausea, vomiting, or abdominal pain.  Extremities/Neurological:  No numbness or tingling.  Musculoskeletal:  No joint pain.    PHYSICAL EXAMINATION:  HEENT:  Head:  Normocephalic, atraumatic.  Eyes:  No scleral icterus.  Ears:  Hearing bilaterally appeared intact.  NECK:  Supple.  RESPIRATORY:  Good air entry bilaterally.  CARDIOVASCULAR:  S1 and S2 heard.  ABDOMEN:  Soft and nontender.  EXTREMITIES:  No clubbing or cyanosis was noted.      NEUROLOGIC:  The patient is sleepy.  Does suffer from underlying memory issues, dementia.  Extraocular movements were intact.  Speech was fluent.  Smile symmetric.  Motor:  Bilateral upper 4/5, bilateral lower 3+/5.  Was able to follow complex commands.                      LABS:  CBC Full  -  ( 15 Mar 2023 08:43 )  WBC Count : 7.09 K/uL  RBC Count : 4.32 M/uL  Hemoglobin : 13.4 g/dL  Hematocrit : 40.9 %  Platelet Count - Automated : 258 K/uL  Mean Cell Volume : 94.7 fl  Mean Cell Hemoglobin : 31.0 pg  Mean Cell Hemoglobin Concentration : 32.8 gm/dL  Auto Neutrophil # : x  Auto Lymphocyte # : x  Auto Monocyte # : x  Auto Eosinophil # : x  Auto Basophil # : x  Auto Neutrophil % : x  Auto Lymphocyte % : x  Auto Monocyte % : x  Auto Eosinophil % : x  Auto Basophil % : x      03-15    134<L>  |  101  |  16  ----------------------------<  115<H>  4.3   |  26  |  1.00    Ca    9.0      15 Mar 2023 08:43      Hemoglobin A1C:       Vitamin B12         RADIOLOGY      ANALYSIS AND PLAN:  This is a 89-year-old with episode of altered mental status.  For altered mental status most likely secondary to dementia becoming more prominent in the hospital setting, suspect less likely this is a primary central nervous system event.  For dementia with agitation, would recommend Psychiatry followup with adjustment of medication as needed.  For history of dementia   For history of hypothyroidism, continue the patient on Synthroid.  spoke to staff did well last 3/15 3/16    Spoke with daughter, Poly, at [phone] 3/17 and brother  Clarke, at [phone].    Greater than 40 minutes of time was spent with the patient, plan of care, reviewing data, with greater than 50% of the visit was spent counseling and/or coordinating care with multidisciplinary healthcare team

## 2023-03-16 NOTE — CASE MANAGEMENT PROGRESS NOTE - NSCMPROGRESSNOTE_GEN_ALL_CORE
Discussed patient in interdiscipinary rounds.  Patient continues with current medication regiment.  Will continue to follow from a case management perspective.

## 2023-03-16 NOTE — PROGRESS NOTE ADULT - ASSESSMENT
89F, dementia, HTN, hypothy, Afib not on a/c; recent admit for UTI/behavioral - adm w/persistent behavioral disturbance with aggression/agitation  -CTH neg  -UA neg  -no e/o acute infection  -c/f progressing dementia - s/p IM zyprexa, IM haldol - eval by Neuro and Psych - continue prn and standing seroquel, standing remeron  -hypothy - continue synthroid  -HTN - continue diltiazem per home meds  -dvt prophy      3/15 -Lengthy discussion w/daughter Neela- all diagnoses/evaluations/mgmt plans discussed in detail; all questions answered comprehensively

## 2023-03-16 NOTE — PROGRESS NOTE ADULT - SUBJECTIVE AND OBJECTIVE BOX
CC/F/U for: AMS/agitation, dementia    HPI:  89 yo F with PMH dementia, Afib not on AC, hypothyroidism, R total hip replacement, GERD, HTN presents to the ED with AMS. Patient combative and uncooperative at time of admission. Son Clarke present at bedside to provide history, Son reports patient was discharged on 3/7 Tuesday and was mostly cooperative, at her mental baseline until about Friday 3/10. Son reports that she has intermittent episodes of agitation and confusion. Today, patient thought she was in [place], called her children and grandchildren every 10 minutes. Patient has been verbally abusive to her HHA. [facility] reported that patient was yelling and agitated in the library.     Patient was recently hospitalized at [facility] from 3/4 through 3/7/2023 for AMS, found to have UTI. Patient has 3 more tablets left of PO [id number]-[id number]. Last dose taken was this AM.     ED Course:   Vitals: BP: 128/73, HR: 71, Temp: 97.7, RR: 20, SpO2: 95% on RA  Labs:  Na 132, AG 4, BUN 24, Cr 1.4, Gluc 107, eGFR 35  UA: pending  Received in the ED quetiapine 25mg x1   (12 Mar 2023 01:10)      INTERVAL HPI/OVERNIGHT EVENTS:  Pt seen and examined at bedside - remains w/improved behavioral control on seroquel.   Lengthy discussion w/daughter Neela yesterday - all diagnoses/evaluations/mgmt plans discussed in detail; all questions answered comprehensively    Allergies/Intolerance: Keflex (Other)      MEDICATIONS  (STANDING):  diltiazem    milliGRAM(s) Oral daily  heparin   Injectable 5000 Unit(s) SubCutaneous every 12 hours  levothyroxine 75 MICROGram(s) Oral daily  mirtazapine 15 milliGRAM(s) Oral at bedtime  pantoprazole    Tablet 40 milliGRAM(s) Oral before breakfast  QUEtiapine 50 milliGRAM(s) Oral at bedtime  sertraline 150 milliGRAM(s) Oral daily  sodium chloride 0.9%. 1000 milliLiter(s) (60 mL/Hr) IV Continuous <Continuous>    MEDICATIONS  (PRN):  acetaminophen     Tablet .. 650 milliGRAM(s) Oral every 6 hours PRN Temp greater or equal to 38C (100.4F), Mild Pain (1 - 3)  melatonin 3 milliGRAM(s) Oral at bedtime PRN Insomnia  OLANZapine Injectable 5 milliGRAM(s) IntraMuscular every 12 hours PRN agitation and combativeness  ondansetron    Tablet 4 milliGRAM(s) Oral every 6 hours PRN Nausea and/or Vomiting  QUEtiapine 12.5 milliGRAM(s) Oral daily PRN for agitation in am        ROS: uto      PHYSICAL EXAMINATION:  Vital Signs Last 24 Hrs  T(C): 36.6 (16 Mar 2023 11:46), Max: 36.6 (15 Mar 2023 21:48)  T(F): 97.9 (16 Mar 2023 11:46), Max: 97.9 (16 Mar 2023 11:46)  HR: 60 (16 Mar 2023 11:46) (60 - 67)  BP: 127/65 (16 Mar 2023 11:46) (111/66 - 134/78)  BP(mean): --  RR: 19 (16 Mar 2023 11:46) (18 - 19)  SpO2: 91% (16 Mar 2023 11:46) (91% - 92%)    Parameters below as of 16 Mar 2023 11:46  Patient On (Oxygen Delivery Method): room air    GENERAL: elderly female, in NAD  HEENT: mucous membranes dry  RESP: respirations unlabored  HEART: HR 60s  ABDOMEN: soft, ND  EXTREMITIES:  no edema b/l LEs  NEURO: awake, alert, interactive; moves all extremities      LABS:                        13.4   7.09  )-----------( 258      ( 15 Mar 2023 08:43 )             40.9     03-16    134<L>  |  106  |  21  ----------------------------<  89  4.5   |  25  |  1.10    Ca    8.8      16 Mar 2023 07:30

## 2023-03-17 LAB
ANION GAP SERPL CALC-SCNC: 5 MMOL/L — SIGNIFICANT CHANGE UP (ref 5–17)
BUN SERPL-MCNC: 19 MG/DL — SIGNIFICANT CHANGE UP (ref 7–23)
CALCIUM SERPL-MCNC: 8.7 MG/DL — SIGNIFICANT CHANGE UP (ref 8.5–10.1)
CHLORIDE SERPL-SCNC: 105 MMOL/L — SIGNIFICANT CHANGE UP (ref 96–108)
CO2 SERPL-SCNC: 24 MMOL/L — SIGNIFICANT CHANGE UP (ref 22–31)
CREAT SERPL-MCNC: 0.93 MG/DL — SIGNIFICANT CHANGE UP (ref 0.5–1.3)
EGFR: 57 ML/MIN/1.73M2 — LOW
GLUCOSE SERPL-MCNC: 109 MG/DL — HIGH (ref 70–99)
POTASSIUM SERPL-MCNC: 4.1 MMOL/L — SIGNIFICANT CHANGE UP (ref 3.5–5.3)
POTASSIUM SERPL-SCNC: 4.1 MMOL/L — SIGNIFICANT CHANGE UP (ref 3.5–5.3)
SODIUM SERPL-SCNC: 134 MMOL/L — LOW (ref 135–145)

## 2023-03-17 PROCEDURE — 99232 SBSQ HOSP IP/OBS MODERATE 35: CPT

## 2023-03-17 RX ADMIN — HEPARIN SODIUM 5000 UNIT(S): 5000 INJECTION INTRAVENOUS; SUBCUTANEOUS at 18:57

## 2023-03-17 RX ADMIN — QUETIAPINE FUMARATE 50 MILLIGRAM(S): 200 TABLET, FILM COATED ORAL at 22:46

## 2023-03-17 RX ADMIN — MIRTAZAPINE 15 MILLIGRAM(S): 45 TABLET, ORALLY DISINTEGRATING ORAL at 22:45

## 2023-03-17 RX ADMIN — Medication 300 MILLIGRAM(S): at 05:26

## 2023-03-17 RX ADMIN — SERTRALINE 150 MILLIGRAM(S): 25 TABLET, FILM COATED ORAL at 15:35

## 2023-03-17 RX ADMIN — Medication 75 MICROGRAM(S): at 05:27

## 2023-03-17 NOTE — PROGRESS NOTE ADULT - SUBJECTIVE AND OBJECTIVE BOX
Patient is a 89y old  Female who presents with a chief complaint of AMS 2/2 progressive dementia (17 Mar 2023 07:14)      Subjective:  INTERVAL HPI/OVERNIGHT EVENTS: Patient seen and examined at bedside. No overnight events occurred. Patient calm this am, oriented x 1. on one to one observation.     MEDICATIONS  (STANDING):  diltiazem    milliGRAM(s) Oral daily  heparin   Injectable 5000 Unit(s) SubCutaneous every 12 hours  levothyroxine 75 MICROGram(s) Oral daily  mirtazapine 15 milliGRAM(s) Oral at bedtime  pantoprazole    Tablet 40 milliGRAM(s) Oral before breakfast  QUEtiapine 50 milliGRAM(s) Oral at bedtime  sertraline 150 milliGRAM(s) Oral daily  sodium chloride 0.9%. 1000 milliLiter(s) (60 mL/Hr) IV Continuous <Continuous>    MEDICATIONS  (PRN):  acetaminophen     Tablet .. 650 milliGRAM(s) Oral every 6 hours PRN Temp greater or equal to 38C (100.4F), Mild Pain (1 - 3)  melatonin 3 milliGRAM(s) Oral at bedtime PRN Insomnia  OLANZapine Injectable 5 milliGRAM(s) IntraMuscular every 12 hours PRN agitation and combativeness  ondansetron    Tablet 4 milliGRAM(s) Oral every 6 hours PRN Nausea and/or Vomiting  QUEtiapine 12.5 milliGRAM(s) Oral daily PRN for agitation in am      Allergies    Keflex (Other)    Intolerances        REVIEW OF SYSTEMS:  limited 2/2 dementia. denies all complaints.     Objective:  Vital Signs Last 24 Hrs  T(C): 36.3 (17 Mar 2023 04:32), Max: 36.7 (16 Mar 2023 13:51)  T(F): 97.3 (17 Mar 2023 04:32), Max: 98 (16 Mar 2023 13:51)  HR: 59 (17 Mar 2023 04:32) (59 - 67)  BP: 132/68 (17 Mar 2023 04:32) (118/76 - 132/68)  BP(mean): --  RR: 18 (17 Mar 2023 04:32) (18 - 19)  SpO2: 91% (17 Mar 2023 04:32) (91% - 92%)    Parameters below as of 17 Mar 2023 04:32  Patient On (Oxygen Delivery Method): room air    GENERAL: NAD, tired appearing, opens eyes to name  HEAD:  Atraumatic, Normocephalic  EYES: EOMI, conjunctiva and sclera clear  ENT: Moist mucous membranes  NECK: Supple, No JVD  CHEST/LUNG: Clear to auscultation bilaterally; No rales, rhonchi, wheezing, or rubs. Unlabored respirations  HEART: Regular rate and rhythm; No murmurs, rubs, or gallops  ABDOMEN: Bowel sounds present; Soft, mild ttp diffusely, Nondistended.  EXTREMITIES:  2+ Peripheral Pulses, brisk capillary refill. No clubbing, cyanosis, or edema  NERVOUS SYSTEM:  Alert & Oriented x1, speech slow  MSK: moving all extremities   SKIN: warm, dry    LABS:      17 Mar 2023 07:47    134    |  105    |  19     ----------------------------<  109    4.1     |  24     |  0.93     Ca    8.7        17 Mar 2023 07:47          CAPILLARY BLOOD GLUCOSE              RADIOLOGY & ADDITIONAL TESTS:    Personally reviewed.     Consultant(s) Notes Reviewed:  [x] YES  [ ] NO     [Mother] : mother [Breast milk] : breast milk [Hours between feeds ___] : Child is fed every [unfilled] hours [Fruits] : fruits [Vegetables] : vegetables [Expressed Breast milk ___oz/feed] : [unfilled] oz of expressed breast milk per feed [Formula ___ oz/feed] : [unfilled] oz of formula per feed [Vitamins ___] : Patient takes [unfilled] vitamins daily [Normal] : Normal [___ voids per day] : [unfilled] voids per day [Frequency of stools: ___] : Frequency of stools: [unfilled]  stools [per day] : per day. [Yellow] : yellow [Seedy] : seedy [In Bassinet/Crib] : sleeps in bassinet/crib [On back] : sleeps on back [Sleeps 12-16 hours per 24 hours (including naps)] : sleeps 12-16 hours per 24 hours (including naps) [Pacifier use] : Pacifier use [Tummy time] : tummy time [No] : No cigarette smoke exposure [Water heater temperature set at <120 degrees F] : Water heater temperature set at <120 degrees F [Rear facing car seat in back seat] : Rear facing car seat in back seat [Carbon Monoxide Detectors] : Carbon monoxide detectors at home [Smoke Detectors] : Smoke detectors at home. [Co-sleeping] : no co-sleeping [Loose bedding, pillow, toys, and/or bumpers in crib] : no loose bedding, pillow, toys, and/or bumpers in crib [Screen time only for video chatting] : screen time not just for video chatting [Exposure to electronic nicotine delivery system] : No exposure to electronic nicotine delivery system [de-identified] : Six month old female presents for well check visit. Has been doing well since the last visit.  [de-identified] : Directly breastfeeding. Taking EHM 5-6 oz/feeding. Will supplement with formula once or twice a week. Has tried variety of fruits and veggies for solid foods.  [de-identified] : Has had some difficulty sleeping through the night. Working on sleep training at this time.

## 2023-03-17 NOTE — PROGRESS NOTE ADULT - ASSESSMENT
89F, dementia, HTN, hypothy, Afib not on a/c; recent admit for UTI/behavioral - adm w/persistent behavioral disturbance with aggression/agitation    #Dementia with behavioural disturbance  - pt less agitated today 3/17  -CTH neg for acute pathology  -no S&S acute infection. no leukocytosis. afebrile  - mild ttp abd- pt is voiding and UA grossly neg. no n/v/d. if persists will evaluate further  -agitation c/w progressing dementia   - continue zyprexa, seroquel and zoloft   - continue constant observation  -Neuro and Psych consulted  - pt has 24 hour aide at home- likely dispo plan is home with 24 hour care    #hypothyroidism   - continue synthroid    #HTN   - continue diltiazem per home meds    #Dvt prophy  - hep SQ

## 2023-03-17 NOTE — PROGRESS NOTE ADULT - SUBJECTIVE AND OBJECTIVE BOX
Neurology follow up note    TYRON [id number]      Interval History:    Patient feels ok no new complaints.    Allergies    Keflex (Other)    Intolerances        MEDICATIONS    acetaminophen     Tablet .. 650 milliGRAM(s) Oral every 6 hours PRN  diltiazem    milliGRAM(s) Oral daily  heparin   Injectable 5000 Unit(s) SubCutaneous every 12 hours  levothyroxine 75 MICROGram(s) Oral daily  melatonin 3 milliGRAM(s) Oral at bedtime PRN  mirtazapine 15 milliGRAM(s) Oral at bedtime  OLANZapine Injectable 5 milliGRAM(s) IntraMuscular every 12 hours PRN  ondansetron    Tablet 4 milliGRAM(s) Oral every 6 hours PRN  pantoprazole    Tablet 40 milliGRAM(s) Oral before breakfast  QUEtiapine 12.5 milliGRAM(s) Oral daily PRN  QUEtiapine 50 milliGRAM(s) Oral at bedtime  sertraline 150 milliGRAM(s) Oral daily  sodium chloride 0.9%. 1000 milliLiter(s) IV Continuous <Continuous>              Vital Signs Last 24 Hrs  T(C): 36.3 (17 Mar 2023 04:32), Max: 36.7 (16 Mar 2023 13:51)  T(F): 97.3 (17 Mar 2023 04:32), Max: 98 (16 Mar 2023 13:51)  HR: 59 (17 Mar 2023 04:32) (59 - 67)  BP: 132/68 (17 Mar 2023 04:32) (118/76 - 132/68)  BP(mean): --  RR: 18 (17 Mar 2023 04:32) (18 - 19)  SpO2: 91% (17 Mar 2023 04:32) (91% - 92%)    Parameters below as of 17 Mar 2023 04:32  Patient On (Oxygen Delivery Method): room air        REVIEW OF SYSTEMS:  Constitutional:  The patient denies fever, chills, or night sweats.  Head:  No headache.  Eyes:  No double vision or blurry vision.  Ears:  No ringing in the ears.  Neck:  No neck pain.  Respiratory:  No shortness of breath.  Cardiovascular:  No chest pain.  Abdomen:  No nausea, vomiting, or abdominal pain.  Extremities/Neurological:  No numbness or tingling.  Musculoskeletal:  No joint pain.    PHYSICAL EXAMINATION:  HEENT:  Head:  Normocephalic, atraumatic.  Eyes:  No scleral icterus.  Ears:  Hearing bilaterally appeared intact.  NECK:  Supple.  RESPIRATORY:  Good air entry bilaterally.  CARDIOVASCULAR:  S1 and S2 heard.  ABDOMEN:  Soft and nontender.  EXTREMITIES:  No clubbing or cyanosis was noted.      NEUROLOGIC:  The patient is sleepy.  Does suffer from underlying memory issues, dementia.  Extraocular movements were intact.  Speech was fluent.  Smile symmetric.  Motor:  Bilateral upper 4/5, bilateral lower 3+/5.  Was able to follow complex commands.                LABS:  CBC Full  -  ( 15 Mar 2023 08:43 )  WBC Count : 7.09 K/uL  RBC Count : 4.32 M/uL  Hemoglobin : 13.4 g/dL  Hematocrit : 40.9 %  Platelet Count - Automated : 258 K/uL  Mean Cell Volume : 94.7 fl  Mean Cell Hemoglobin : 31.0 pg  Mean Cell Hemoglobin Concentration : 32.8 gm/dL  Auto Neutrophil # : x  Auto Lymphocyte # : x  Auto Monocyte # : x  Auto Eosinophil # : x  Auto Basophil # : x  Auto Neutrophil % : x  Auto Lymphocyte % : x  Auto Monocyte % : x  Auto Eosinophil % : x  Auto Basophil % : x      03-16    134<L>  |  106  |  21  ----------------------------<  89  4.5   |  25  |  1.10    Ca    8.8      16 Mar 2023 07:30      Hemoglobin A1C:       Vitamin B12         RADIOLOGY    ANALYSIS AND PLAN:  This is a 89-year-old with episode of altered mental status.  For altered mental status most likely secondary to dementia becoming more prominent in the hospital setting, suspect less likely this is a primary central nervous system event.  For dementia with agitation, would recommend Psychiatry followup with adjustment of medication as needed.  For history of dementia   For history of hypothyroidism, continue the patient on Synthroid.  spoke to staff did well last 3/15 3/16    Spoke with daughter, Poly, at [phone] 3/17 and brother  Clarke, at [phone].    Greater than 40 minutes of time was spent with the patient, plan of care, reviewing data, with greater than 50% of the visit was spent counseling and/or coordinating care with multidisciplinary healthcare team     Neurology follow up note    TYRON [id number]      Interval History:    Patient feels ok no new complaints.    Allergies    Keflex (Other)    Intolerances        MEDICATIONS    acetaminophen     Tablet .. 650 milliGRAM(s) Oral every 6 hours PRN  diltiazem    milliGRAM(s) Oral daily  heparin   Injectable 5000 Unit(s) SubCutaneous every 12 hours  levothyroxine 75 MICROGram(s) Oral daily  melatonin 3 milliGRAM(s) Oral at bedtime PRN  mirtazapine 15 milliGRAM(s) Oral at bedtime  OLANZapine Injectable 5 milliGRAM(s) IntraMuscular every 12 hours PRN  ondansetron    Tablet 4 milliGRAM(s) Oral every 6 hours PRN  pantoprazole    Tablet 40 milliGRAM(s) Oral before breakfast  QUEtiapine 12.5 milliGRAM(s) Oral daily PRN  QUEtiapine 50 milliGRAM(s) Oral at bedtime  sertraline 150 milliGRAM(s) Oral daily  sodium chloride 0.9%. 1000 milliLiter(s) IV Continuous <Continuous>              Vital Signs Last 24 Hrs  T(C): 36.3 (17 Mar 2023 04:32), Max: 36.7 (16 Mar 2023 13:51)  T(F): 97.3 (17 Mar 2023 04:32), Max: 98 (16 Mar 2023 13:51)  HR: 59 (17 Mar 2023 04:32) (59 - 67)  BP: 132/68 (17 Mar 2023 04:32) (118/76 - 132/68)  BP(mean): --  RR: 18 (17 Mar 2023 04:32) (18 - 19)  SpO2: 91% (17 Mar 2023 04:32) (91% - 92%)    Parameters below as of 17 Mar 2023 04:32  Patient On (Oxygen Delivery Method): room air        REVIEW OF SYSTEMS:  Constitutional:  The patient denies fever, chills, or night sweats.  Head:  No headache.  Eyes:  No double vision or blurry vision.  Ears:  No ringing in the ears.  Neck:  No neck pain.  Respiratory:  No shortness of breath.  Cardiovascular:  No chest pain.  Abdomen:  No nausea, vomiting, or abdominal pain.  Extremities/Neurological:  No numbness or tingling.  Musculoskeletal:  No joint pain.    PHYSICAL EXAMINATION:  HEENT:  Head:  Normocephalic, atraumatic.  Eyes:  No scleral icterus.  Ears:  Hearing bilaterally appeared intact.  NECK:  Supple.  RESPIRATORY:  Good air entry bilaterally.  CARDIOVASCULAR:  S1 and S2 heard.  ABDOMEN:  Soft and nontender.  EXTREMITIES:  No clubbing or cyanosis was noted.      NEUROLOGIC:  The patient is sleepy.  Does suffer from underlying memory issues, dementia.  Extraocular movements were intact.  Speech was fluent.  Smile symmetric.  Motor:  Bilateral upper 4/5, bilateral lower 3+/5.  Was able to follow complex commands.                LABS:  CBC Full  -  ( 15 Mar 2023 08:43 )  WBC Count : 7.09 K/uL  RBC Count : 4.32 M/uL  Hemoglobin : 13.4 g/dL  Hematocrit : 40.9 %  Platelet Count - Automated : 258 K/uL  Mean Cell Volume : 94.7 fl  Mean Cell Hemoglobin : 31.0 pg  Mean Cell Hemoglobin Concentration : 32.8 gm/dL  Auto Neutrophil # : x  Auto Lymphocyte # : x  Auto Monocyte # : x  Auto Eosinophil # : x  Auto Basophil # : x  Auto Neutrophil % : x  Auto Lymphocyte % : x  Auto Monocyte % : x  Auto Eosinophil % : x  Auto Basophil % : x      03-16    134<L>  |  106  |  21  ----------------------------<  89  4.5   |  25  |  1.10    Ca    8.8      16 Mar 2023 07:30      Hemoglobin A1C:       Vitamin B12         RADIOLOGY    ANALYSIS AND PLAN:  This is a 89-year-old with episode of altered mental status.  For altered mental status most likely secondary to dementia becoming more prominent in the hospital setting, suspect less likely this is a primary central nervous system event.  For dementia with agitation, would recommend Psychiatry followup with adjustment of medication as needed.  For history of dementia   For history of hypothyroidism, continue the patient on Synthroid.  spoke to staff did well last 3/16-3/17    Spoke with daughter, Poly, at [phone] 3/17 and brother  Clarke, at [phone].    Greater than 34 minutes of time was spent with the patient, plan of care, reviewing data, with greater than 50% of the visit was spent counseling and/or coordinating care with multidisciplinary healthcare team

## 2023-03-18 LAB
ANION GAP SERPL CALC-SCNC: 4 MMOL/L — LOW (ref 5–17)
BUN SERPL-MCNC: 19 MG/DL — SIGNIFICANT CHANGE UP (ref 7–23)
CALCIUM SERPL-MCNC: 8.6 MG/DL — SIGNIFICANT CHANGE UP (ref 8.5–10.1)
CHLORIDE SERPL-SCNC: 106 MMOL/L — SIGNIFICANT CHANGE UP (ref 96–108)
CO2 SERPL-SCNC: 27 MMOL/L — SIGNIFICANT CHANGE UP (ref 22–31)
CREAT SERPL-MCNC: 1 MG/DL — SIGNIFICANT CHANGE UP (ref 0.5–1.3)
EGFR: 52 ML/MIN/1.73M2 — LOW
GLUCOSE SERPL-MCNC: 93 MG/DL — SIGNIFICANT CHANGE UP (ref 70–99)
HCT VFR BLD CALC: 37.8 % — SIGNIFICANT CHANGE UP (ref 34.5–45)
HGB BLD-MCNC: 12.2 G/DL — SIGNIFICANT CHANGE UP (ref 11.5–15.5)
MCHC RBC-ENTMCNC: 30.9 PG — SIGNIFICANT CHANGE UP (ref 27–34)
MCHC RBC-ENTMCNC: 32.3 GM/DL — SIGNIFICANT CHANGE UP (ref 32–36)
MCV RBC AUTO: 95.7 FL — SIGNIFICANT CHANGE UP (ref 80–100)
NRBC # BLD: 0 /100 WBCS — SIGNIFICANT CHANGE UP (ref 0–0)
PLATELET # BLD AUTO: 212 K/UL — SIGNIFICANT CHANGE UP (ref 150–400)
POTASSIUM SERPL-MCNC: 4.6 MMOL/L — SIGNIFICANT CHANGE UP (ref 3.5–5.3)
POTASSIUM SERPL-SCNC: 4.6 MMOL/L — SIGNIFICANT CHANGE UP (ref 3.5–5.3)
RBC # BLD: 3.95 M/UL — SIGNIFICANT CHANGE UP (ref 3.8–5.2)
RBC # FLD: 13.3 % — SIGNIFICANT CHANGE UP (ref 10.3–14.5)
SODIUM SERPL-SCNC: 137 MMOL/L — SIGNIFICANT CHANGE UP (ref 135–145)
WBC # BLD: 5.72 K/UL — SIGNIFICANT CHANGE UP (ref 3.8–10.5)
WBC # FLD AUTO: 5.72 K/UL — SIGNIFICANT CHANGE UP (ref 3.8–10.5)

## 2023-03-18 PROCEDURE — 99233 SBSQ HOSP IP/OBS HIGH 50: CPT

## 2023-03-18 RX ORDER — OLANZAPINE 15 MG/1
5 TABLET, FILM COATED ORAL DAILY
Refills: 0 | Status: DISCONTINUED | OUTPATIENT
Start: 2023-03-18 | End: 2023-03-19

## 2023-03-18 RX ORDER — OLANZAPINE 15 MG/1
5 TABLET, FILM COATED ORAL AT BEDTIME
Refills: 0 | Status: DISCONTINUED | OUTPATIENT
Start: 2023-03-18 | End: 2023-03-19

## 2023-03-18 RX ADMIN — OLANZAPINE 5 MILLIGRAM(S): 15 TABLET, FILM COATED ORAL at 22:28

## 2023-03-18 RX ADMIN — Medication 75 MICROGRAM(S): at 05:29

## 2023-03-18 RX ADMIN — Medication 300 MILLIGRAM(S): at 05:29

## 2023-03-18 RX ADMIN — HEPARIN SODIUM 5000 UNIT(S): 5000 INJECTION INTRAVENOUS; SUBCUTANEOUS at 17:47

## 2023-03-18 RX ADMIN — HEPARIN SODIUM 5000 UNIT(S): 5000 INJECTION INTRAVENOUS; SUBCUTANEOUS at 05:32

## 2023-03-18 RX ADMIN — PANTOPRAZOLE SODIUM 40 MILLIGRAM(S): 20 TABLET, DELAYED RELEASE ORAL at 05:29

## 2023-03-18 RX ADMIN — SERTRALINE 150 MILLIGRAM(S): 25 TABLET, FILM COATED ORAL at 14:37

## 2023-03-18 RX ADMIN — Medication 3 MILLIGRAM(S): at 22:34

## 2023-03-18 RX ADMIN — MIRTAZAPINE 15 MILLIGRAM(S): 45 TABLET, ORALLY DISINTEGRATING ORAL at 22:28

## 2023-03-18 NOTE — PROGRESS NOTE ADULT - TIME BILLING
Note written by attending, see above.  Time spent: 60min. More than 50% of the visit was spent counseling the patient/family on medical condition and coordination of care
Note written by attending, see above.  Time spent: 40min. More than 50% of the visit was spent counseling the patient/family on medical condition and coordination of care
Note written by attending. Meds, labs, vitals, chart reviewed. D/w Son at bedside. D/w SW/JEANNE , RN
Note written by attending. Meds, labs, vitals, chart reviewed.

## 2023-03-18 NOTE — PROGRESS NOTE ADULT - SUBJECTIVE AND OBJECTIVE BOX
04/02/19 1600   [facility] Group Therapy   Group Name Medication   Participation Level Appropriate   Participation Quality Cooperative   Insight/Motivation Improved   Affect/Mood Display Appropriate   Cognition Alert      Neurology Follow up note    TYRON [id number]    HPI:  89 yo F with PMH dementia, Afib not on AC, hypothyroidism, R total hip replacement, GERD, HTN presents to the ED with AMS. Patient combative and uncooperative at time of admission. Son Clarke present at bedside to provide history, Son reports patient was discharged on 3/7 Tuesday and was mostly cooperative, at her mental baseline until about Friday 3/10. Son reports that she has intermittent episodes of agitation and confusion. Today, patient thought she was in [place], called her children and grandchildren every 10 minutes. Patient has been verbally abusive to her HHA. [facility] reported that patient was yelling and agitated in the library.     Patient was recently hospitalized at [facility] from 3/4 through 3/7/2023 for AMS, found to have UTI. Patient has 3 more tablets left of PO [id number]-[id number]. Last dose taken was this AM.     ED Course:   Vitals: BP: 128/73, HR: 71, Temp: 97.7, RR: 20, SpO2: 95% on RA  Labs:  Na 132, AG 4, BUN 24, Cr 1.4, Gluc 107, eGFR 35  UA: pending  Received in the ED quetiapine 25mg x1   (12 Mar 2023 01:10)      Interval History -no new events    Patient is seen, chart was reviewed and case was discussed with the treatment team.  Pt is not in any distress.       Vital Signs Last 24 Hrs  T(C): 36.5 (18 Mar 2023 05:10), Max: 36.5 (18 Mar 2023 05:10)  T(F): 97.7 (18 Mar 2023 05:10), Max: 97.7 (18 Mar 2023 05:10)  HR: 64 (18 Mar 2023 05:10) (64 - 70)  BP: 133/73 (18 Mar 2023 05:10) (124/71 - 133/73)  BP(mean): --  RR: 18 (18 Mar 2023 05:10) (18 - 18)  SpO2: 91% (18 Mar 2023 05:10) (91% - 92%)    Parameters below as of 18 Mar 2023 05:10  Patient On (Oxygen Delivery Method): room air            REVIEW OF SYSTEMS:    Constitutional: No fever, weight loss or fatigue  Eyes: No eye pain, visual disturbances, or discharge  ENT:  No difficulty hearing, tinnitus, vertigo; No sinus or throat pain  Neck: No pain or stiffness  Respiratory: No  wheezing, chills or hemoptysis  Cardiovascular: No chest pain, palpitations, shortness of breath, dizziness or leg swelling  Gastrointestinal: No abdominal or epigastric pain. No nausea,   Genitourinary: No dysuria, frequency, hematuria or incontinence  Neurological: No headaches, memory loss, loss of strength, numbness or tremors  Psychiatric: No depression, anxiety, mood swings or difficulty sleeping  Musculoskeletal: No joint pain or swelling; No muscle, back or extremity pain  Skin: No itching, burning, rashes or lesions   Lymph Nodes: No enlarged glands  Endocrine: No heat or cold intolerance; No hair loss,   Allergy and Immunologic: No hives or eczema    On Neurological Examination:    Mental Status - Pt is alert, awake,. Follows commands well and able to answer questions appropriately.Mood and affect  normal    Speech -  Normal.     Cranial Nerves - Pupils 3 mm equal and reactive to light, extraocular eye movements intact. Pt has no visual field deficit.  Pt has no facial asymmetry. Facial sensation is intact.Tongue - is in midline.    Muscle tone - is normal     Motor Exam - 4/5 all over, No drift. No shaking or tremors.    Sensory Exam - . Pt withdraws all extremities equally on stimulation. No asymmetry seen. No complaints of tingling, numbness.  coordination:    Finger to nose: normal      Deep tendon Reflexes - 2 plus all over.    Neck Supple -  Yes.     MEDICATIONS    acetaminophen     Tablet .. 650 milliGRAM(s) Oral every 6 hours PRN  diltiazem    milliGRAM(s) Oral daily  heparin   Injectable 5000 Unit(s) SubCutaneous every 12 hours  levothyroxine 75 MICROGram(s) Oral daily  melatonin 3 milliGRAM(s) Oral at bedtime PRN  mirtazapine 15 milliGRAM(s) Oral at bedtime  OLANZapine 5 milliGRAM(s) Oral at bedtime  OLANZapine 5 milliGRAM(s) Oral daily PRN  OLANZapine Injectable 5 milliGRAM(s) IntraMuscular every 12 hours PRN  ondansetron    Tablet 4 milliGRAM(s) Oral every 6 hours PRN  pantoprazole    Tablet 40 milliGRAM(s) Oral before breakfast  QUEtiapine 12.5 milliGRAM(s) Oral daily PRN  sertraline 150 milliGRAM(s) Oral daily  sodium chloride 0.9%. 1000 milliLiter(s) IV Continuous <Continuous>      Allergies    Keflex (Other)    Intolerances        LABS:  CBC Full  -  ( 18 Mar 2023 07:15 )  WBC Count : 5.72 K/uL  RBC Count : 3.95 M/uL  Hemoglobin : 12.2 g/dL  Hematocrit : 37.8 %  Platelet Count - Automated : 212 K/uL  Mean Cell Volume : 95.7 fl  Mean Cell Hemoglobin : 30.9 pg  Mean Cell Hemoglobin Concentration : 32.3 gm/dL        03-18    137  |  106  |  19  ----------------------------<  93  4.6   |  27  |  1.00    Ca    8.6      18 Mar 2023 07:15      Hemoglobin A1C:     Vitamin B12     RADIOLOGY    ASSESSMENT AND PLAN:      seen for ams  likely ME  underlying dementia    on seroquel  Physical therapy evaluation.  OOB to chair/ambulation with assistance only  Pain is accessed and addressed.  Would continue to follow.

## 2023-03-18 NOTE — PROGRESS NOTE ADULT - REASON FOR ADMISSION
AMS 2/2 progressive dementia

## 2023-03-18 NOTE — PROGRESS NOTE ADULT - SUBJECTIVE AND OBJECTIVE BOX
Patient is a 89y old  Female who presents with a chief complaint of AMS 2/2 progressive dementia (17 Mar 2023 11:06)      Subjective:  INTERVAL HPI/OVERNIGHT EVENTS: Patient seen and examined at bedside. No overnight events occurred. Patient has no complaints at this time. Pt improved today. She remains A&o x 1 but she is calm, answering questions appropriately and following commands.   Pt does state she slept well but son says she called him after midnight last night.     MEDICATIONS  (STANDING):  diltiazem    milliGRAM(s) Oral daily  heparin   Injectable 5000 Unit(s) SubCutaneous every 12 hours  levothyroxine 75 MICROGram(s) Oral daily  mirtazapine 15 milliGRAM(s) Oral at bedtime  OLANZapine 5 milliGRAM(s) Oral at bedtime  pantoprazole    Tablet 40 milliGRAM(s) Oral before breakfast  sertraline 150 milliGRAM(s) Oral daily  sodium chloride 0.9%. 1000 milliLiter(s) (60 mL/Hr) IV Continuous <Continuous>    MEDICATIONS  (PRN):  acetaminophen     Tablet .. 650 milliGRAM(s) Oral every 6 hours PRN Temp greater or equal to 38C (100.4F), Mild Pain (1 - 3)  melatonin 3 milliGRAM(s) Oral at bedtime PRN Insomnia  OLANZapine 5 milliGRAM(s) Oral daily PRN agitation  OLANZapine Injectable 5 milliGRAM(s) IntraMuscular every 12 hours PRN agitation and combativeness  ondansetron    Tablet 4 milliGRAM(s) Oral every 6 hours PRN Nausea and/or Vomiting  QUEtiapine 12.5 milliGRAM(s) Oral daily PRN for agitation in am      Allergies    Keflex (Other)    Intolerances        REVIEW OF SYSTEMS:  CONSTITUTIONAL: No fever or chills  HEENT:  No headache, no sore throat  RESPIRATORY: No cough, wheezing, or shortness of breath  CARDIOVASCULAR: No chest pain, palpitations  GASTROINTESTINAL: No abd pain, nausea, vomiting, or diarrhea  GENITOURINARY: No dysuria, frequency, or hematuria  NEUROLOGICAL: no focal weakness or dizziness  MUSCULOSKELETAL: no myalgias     Objective:  Vital Signs Last 24 Hrs  T(C): 36.5 (18 Mar 2023 05:10), Max: 36.5 (18 Mar 2023 05:10)  T(F): 97.7 (18 Mar 2023 05:10), Max: 97.7 (18 Mar 2023 05:10)  HR: 64 (18 Mar 2023 05:10) (64 - 70)  BP: 133/73 (18 Mar 2023 05:10) (124/71 - 133/73)  BP(mean): --  RR: 18 (18 Mar 2023 05:10) (18 - 18)  SpO2: 91% (18 Mar 2023 05:10) (91% - 92%)    Parameters below as of 18 Mar 2023 05:10  Patient On (Oxygen Delivery Method): room air        GENERAL: NAD, lying in bed comfortably, tired appearing  HEAD:  Atraumatic, Normocephalic  EYES: EOMI,  conjunctiva and sclera clear  ENT: Moist mucous membranes  NECK: Supple, No JVD  CHEST/LUNG: Clear to auscultation bilaterally; No rales, rhonchi, wheezing, or rubs. Unlabored respirations  HEART: Regular rate and rhythm; No murmurs, rubs, or gallops  ABDOMEN: Bowel sounds present; Soft, Nontender, Nondistended. No hepatomegaly  EXTREMITIES:  2+ Peripheral Pulses, brisk capillary refill. No clubbing, cyanosis, or edema  NERVOUS SYSTEM:  Alert & Oriented X1, speech clear. No deficits   SKIN: warm, dry    LABS:                        12.2   5.72  )-----------( 212      ( 18 Mar 2023 07:15 )             37.8     CBC Full  -  ( 18 Mar 2023 07:15 )  WBC Count : 5.72 K/uL  Hemoglobin : 12.2 g/dL  Hematocrit : 37.8 %  Platelet Count - Automated : 212 K/uL  Mean Cell Volume : 95.7 fl  Mean Cell Hemoglobin : 30.9 pg  Mean Cell Hemoglobin Concentration : 32.3 gm/dL  Auto Neutrophil # : x  Auto Lymphocyte # : x  Auto Monocyte # : x  Auto Eosinophil # : x  Auto Basophil # : x  Auto Neutrophil % : x  Auto Lymphocyte % : x  Auto Monocyte % : x  Auto Eosinophil % : x  Auto Basophil % : x    18 Mar 2023 07:15    137    |  106    |  19     ----------------------------<  93     4.6     |  27     |  1.00     Ca    8.6        18 Mar 2023 07:15          CAPILLARY BLOOD GLUCOSE              RADIOLOGY & ADDITIONAL TESTS:    Personally reviewed.     Consultant(s) Notes Reviewed:  [x] YES  [ ] NO

## 2023-03-18 NOTE — PROGRESS NOTE ADULT - ASSESSMENT
89F, dementia, HTN, hypothy, Afib not on a/c; recent admit for UTI/behavioral - adm w/persistent behavioral disturbance with aggression/agitation    #Dementia with behavioural disturbance  - pt less agitated today 3/18  -CTH neg for acute pathology  -no S&S acute infection. no leukocytosis. afebrile  - abdominal ttp from 3/17 now resolved.  -agitation c/w progressing dementia   - pt previously on seroquel, zoloft and prn zyprexa , family friend is a psychiatrist and is suggesting trial of Zyprexa to replace Seroquel as the disintegrating tabs will be easier to use at home upon discharge. will d/c seroquel and add qhs zyprexa. 5mg qhs + 5mg prn. will increase as tolerated.   - continue constant observation  -Neuro and Psych consulted  - pt has 24 hour aide at home- likely dispo plan is home with 24 hour care    #hypothyroidism   - continue synthroid    #HTN   - continue diltiazem per home meds    #Dvt prophy  - hep SQ     89F, dementia, HTN, hypothy, Afib not on a/c; recent admit for UTI/behavioral - adm w/persistent behavioral disturbance with aggression/agitation    #Dementia with behavioural disturbance  - pt less agitated today 3/18  -CTH neg for acute pathology  -no S&S acute infection. no leukocytosis. afebrile  - abdominal ttp from 3/17 now resolved.  -agitation c/w progressing dementia   - pt previously on seroquel, zoloft and prn zyprexa , family friend is a psychiatrist and is suggesting trial of Zyprexa to replace Seroquel as the disintegrating tabs will be easier to use at home upon discharge. will d/c seroquel and add qhs zyprexa. 5mg qhs + 5mg prn. will increase as tolerated.   - continue constant observation  -Neuro and Psych consulted  - pt has 24 hour aide at home- likely dispo plan is home with 24 hour care tomorrow     #hypothyroidism   - continue synthroid    #HTN   - continue diltiazem per home meds    #Dvt prophy  - hep SQ

## 2023-03-18 NOTE — PROGRESS NOTE ADULT - PROVIDER SPECIALTY LIST ADULT
Hospitalist
Neurology
Hospitalist
Neurology
Neurology
Hospitalist
Hospitalist
Internal Medicine
Internal Medicine

## 2023-03-19 ENCOUNTER — TRANSCRIPTION ENCOUNTER (OUTPATIENT)
Age: 89
End: 2023-03-19

## 2023-03-19 VITALS
TEMPERATURE: 99 F | HEART RATE: 88 BPM | SYSTOLIC BLOOD PRESSURE: 100 MMHG | RESPIRATION RATE: 18 BRPM | OXYGEN SATURATION: 93 % | DIASTOLIC BLOOD PRESSURE: 64 MMHG

## 2023-03-19 LAB
ANION GAP SERPL CALC-SCNC: 6 MMOL/L — SIGNIFICANT CHANGE UP (ref 5–17)
BUN SERPL-MCNC: 32 MG/DL — HIGH (ref 7–23)
CALCIUM SERPL-MCNC: 8.9 MG/DL — SIGNIFICANT CHANGE UP (ref 8.5–10.1)
CHLORIDE SERPL-SCNC: 107 MMOL/L — SIGNIFICANT CHANGE UP (ref 96–108)
CO2 SERPL-SCNC: 24 MMOL/L — SIGNIFICANT CHANGE UP (ref 22–31)
CREAT SERPL-MCNC: 1.1 MG/DL — SIGNIFICANT CHANGE UP (ref 0.5–1.3)
EGFR: 46 ML/MIN/1.73M2 — LOW
GLUCOSE SERPL-MCNC: 89 MG/DL — SIGNIFICANT CHANGE UP (ref 70–99)
HCT VFR BLD CALC: 40.8 % — SIGNIFICANT CHANGE UP (ref 34.5–45)
HGB BLD-MCNC: 13.3 G/DL — SIGNIFICANT CHANGE UP (ref 11.5–15.5)
MCHC RBC-ENTMCNC: 31.6 PG — SIGNIFICANT CHANGE UP (ref 27–34)
MCHC RBC-ENTMCNC: 32.6 GM/DL — SIGNIFICANT CHANGE UP (ref 32–36)
MCV RBC AUTO: 96.9 FL — SIGNIFICANT CHANGE UP (ref 80–100)
NRBC # BLD: 0 /100 WBCS — SIGNIFICANT CHANGE UP (ref 0–0)
PLATELET # BLD AUTO: 215 K/UL — SIGNIFICANT CHANGE UP (ref 150–400)
POTASSIUM SERPL-MCNC: 4.6 MMOL/L — SIGNIFICANT CHANGE UP (ref 3.5–5.3)
POTASSIUM SERPL-SCNC: 4.6 MMOL/L — SIGNIFICANT CHANGE UP (ref 3.5–5.3)
RBC # BLD: 4.21 M/UL — SIGNIFICANT CHANGE UP (ref 3.8–5.2)
RBC # FLD: 13.4 % — SIGNIFICANT CHANGE UP (ref 10.3–14.5)
SODIUM SERPL-SCNC: 137 MMOL/L — SIGNIFICANT CHANGE UP (ref 135–145)
WBC # BLD: 6.39 K/UL — SIGNIFICANT CHANGE UP (ref 3.8–10.5)
WBC # FLD AUTO: 6.39 K/UL — SIGNIFICANT CHANGE UP (ref 3.8–10.5)

## 2023-03-19 PROCEDURE — 36415 COLL VENOUS BLD VENIPUNCTURE: CPT

## 2023-03-19 PROCEDURE — 99239 HOSP IP/OBS DSCHRG MGMT >30: CPT

## 2023-03-19 PROCEDURE — 82746 ASSAY OF FOLIC ACID SERUM: CPT

## 2023-03-19 PROCEDURE — 85025 COMPLETE CBC W/AUTO DIFF WBC: CPT

## 2023-03-19 PROCEDURE — 80053 COMPREHEN METABOLIC PANEL: CPT

## 2023-03-19 PROCEDURE — 82607 VITAMIN B-12: CPT

## 2023-03-19 PROCEDURE — 87635 SARS-COV-2 COVID-19 AMP PRB: CPT

## 2023-03-19 PROCEDURE — 92610 EVALUATE SWALLOWING FUNCTION: CPT

## 2023-03-19 PROCEDURE — 80048 BASIC METABOLIC PNL TOTAL CA: CPT

## 2023-03-19 PROCEDURE — 85027 COMPLETE CBC AUTOMATED: CPT

## 2023-03-19 PROCEDURE — 97530 THERAPEUTIC ACTIVITIES: CPT

## 2023-03-19 PROCEDURE — 81001 URINALYSIS AUTO W/SCOPE: CPT

## 2023-03-19 PROCEDURE — 97116 GAIT TRAINING THERAPY: CPT

## 2023-03-19 PROCEDURE — 70450 CT HEAD/BRAIN W/O DYE: CPT

## 2023-03-19 PROCEDURE — 99285 EMERGENCY DEPT VISIT HI MDM: CPT

## 2023-03-19 PROCEDURE — 82962 GLUCOSE BLOOD TEST: CPT

## 2023-03-19 PROCEDURE — 97161 PT EVAL LOW COMPLEX 20 MIN: CPT

## 2023-03-19 RX ORDER — MIRTAZAPINE 45 MG/1
1 TABLET, ORALLY DISINTEGRATING ORAL
Qty: 0 | Refills: 0 | DISCHARGE
Start: 2023-03-19

## 2023-03-19 RX ORDER — MIRTAZAPINE 45 MG/1
1 TABLET, ORALLY DISINTEGRATING ORAL
Qty: 30 | Refills: 0
Start: 2023-03-19 | End: 2023-04-17

## 2023-03-19 RX ORDER — MIRABEGRON 50 MG/1
1 TABLET, EXTENDED RELEASE ORAL
Qty: 0 | Refills: 0 | DISCHARGE

## 2023-03-19 RX ORDER — POTASSIUM CHLORIDE 20 MEQ
1 PACKET (EA) ORAL
Qty: 0 | Refills: 0 | DISCHARGE

## 2023-03-19 RX ORDER — FUROSEMIDE 40 MG
1 TABLET ORAL
Qty: 0 | Refills: 0 | DISCHARGE

## 2023-03-19 RX ORDER — OLANZAPINE 15 MG/1
1 TABLET, FILM COATED ORAL
Qty: 30 | Refills: 0
Start: 2023-03-19 | End: 2023-04-17

## 2023-03-19 RX ORDER — QUETIAPINE FUMARATE 200 MG/1
1 TABLET, FILM COATED ORAL
Qty: 0 | Refills: 0 | DISCHARGE

## 2023-03-19 RX ADMIN — Medication 75 MICROGRAM(S): at 06:16

## 2023-03-19 RX ADMIN — SERTRALINE 150 MILLIGRAM(S): 25 TABLET, FILM COATED ORAL at 13:21

## 2023-03-19 RX ADMIN — PANTOPRAZOLE SODIUM 40 MILLIGRAM(S): 20 TABLET, DELAYED RELEASE ORAL at 06:16

## 2023-03-19 RX ADMIN — Medication 300 MILLIGRAM(S): at 06:16

## 2023-03-19 RX ADMIN — HEPARIN SODIUM 5000 UNIT(S): 5000 INJECTION INTRAVENOUS; SUBCUTANEOUS at 06:16

## 2023-03-19 NOTE — DIETITIAN INITIAL EVALUATION ADULT - OTHER INFO
History of Present Illness:  Reason for Admission: AMS 2/2 progressive dementia  History of Present Illness:   89 yo F with PMH dementia, Afib not on AC, hypothyroidism, R total hip replacement, GERD, HTN presents to the ED with AMS. Patient combative and uncooperative at time of admission. Son Clarke present at bedside to provide history, Son reports patient was discharged on 3/7 Tuesday and was mostly cooperative, at her mental baseline until about Friday 3/10. Son reports that she has intermittent episodes of agitation and confusion.  weight this admit 165#  B12 and folate WNL

## 2023-03-19 NOTE — DISCHARGE NOTE PROVIDER - ATTENDING DISCHARGE PHYSICAL EXAMINATION:
Vital Signs Last 24 Hrs  T(C): 36.3 (19 Mar 2023 05:25), Max: 36.4 (18 Mar 2023 20:33)  T(F): 97.4 (19 Mar 2023 05:25), Max: 97.5 (18 Mar 2023 20:33)  HR: 67 (19 Mar 2023 05:25) (67 - 76)  BP: 120/68 (19 Mar 2023 05:25) (120/68 - 126/77)  BP(mean): --  RR: 19 (19 Mar 2023 05:25) (18 - 19)  SpO2: 91% (19 Mar 2023 05:25) (91% - 93%)    Parameters below as of 19 Mar 2023 05:25  Patient On (Oxygen Delivery Method): room air    GENERAL: NAD, sitting up in chair, answering questions and following commands  HEAD:  Atraumatic, Normocephalic  EYES: EOMI,  conjunctiva and sclera clear  ENT: Moist mucous membranes  NECK: Supple, No JVD  CHEST/LUNG: Clear to auscultation bilaterally; No rales, rhonchi, wheezing, or rubs. Unlabored respirations  HEART: Regular rate and rhythm; No murmurs, rubs, or gallops  ABDOMEN: Bowel sounds present; Soft, Nontender, Nondistended. No hepatomegaly  EXTREMITIES:  2+ Peripheral Pulses, brisk capillary refill. No clubbing, cyanosis, or edema  NERVOUS SYSTEM:  Alert & Oriented X2, speech clear. No deficits   SKIN: warm, dry

## 2023-03-19 NOTE — DISCHARGE NOTE NURSING/CASE MANAGEMENT/SOCIAL WORK - PATIENT PORTAL LINK FT
You can access the FollowMyHealth Patient Portal offered by [facility] by registering at the following website: [email or web address]. By joining [facility]’s FollowMyHealth portal, you will also be able to view your health information using other applications (apps) compatible with our system.

## 2023-03-19 NOTE — DISCHARGE NOTE PROVIDER - CARE PROVIDER_API CALL
Goldberg, Steven M (MD)  Cardiovascular Disease; Internal Medicine  [address] [place] [address]  Phone: [phone]  Fax: [phone]  Follow Up Time:

## 2023-03-19 NOTE — DISCHARGE NOTE PROVIDER - NSDCMRMEDTOKEN_GEN_ALL_CORE_FT
dilTIAZem 300 mg/24 hours oral capsule, extended release: 1 cap(s) orally once a day  folic acid 1 mg oral tablet: 1 tab(s) orally once a day  levothyroxine 75 mcg (0.075 mg) oral tablet: 1 tab(s) orally once a day  methotrexate 2.5 mg oral tablet: 1 tab(s) orally once a week  mirtazapine 15 mg oral tablet: 1 tab(s) orally once a day (at bedtime)  pantoprazole 40 mg oral delayed release tablet: 1 tab(s) orally once a day  sertraline 150 mg oral capsule: 1 cap(s) orally once a day  ZyPREXA Zydis 5 mg oral tablet, disintegratin tab(s) orally once a day (at bedtime)

## 2023-03-19 NOTE — DISCHARGE NOTE PROVIDER - NSDCCPCAREPLAN_GEN_ALL_CORE_FT
PRINCIPAL DISCHARGE DIAGNOSIS  Diagnosis: Dementia  Assessment and Plan of Treatment: Please continue to take zyprexa 5mg at bedtime. You may give patient one additional 5mg dose of zyprexa as needed for agitation. Continue remeron. Please follow up with your neuro psych that you currently have an appointment with.   Myrbetriq, lasix and potassium were stopped during this hospitalization. Please follow up with your primary regarding restarting these medications if needed. please see your primary doctor within 1 week of discharge.      SECONDARY DISCHARGE DIAGNOSES  Diagnosis: GERD (gastroesophageal reflux disease)  Assessment and Plan of Treatment: continue your home medication    Diagnosis: Hypothyroidism  Assessment and Plan of Treatment: continue your home medication    Diagnosis: AMS (altered mental status)  Assessment and Plan of Treatment:

## 2023-03-19 NOTE — DIETITIAN INITIAL EVALUATION ADULT - ORAL INTAKE PTA/DIET HISTORY
PATEL visited patient who is taking breakfast well this AM. PATEL attempted to call son emergency contact unable to reach this AM  patient from home with 24 hour aide  patient reports to RD with good PO but noted with some confusion  no food allergies

## 2023-03-19 NOTE — DIETITIAN INITIAL EVALUATION ADULT - PERTINENT MEDS FT
MEDICATIONS  (STANDING):  diltiazem    milliGRAM(s) Oral daily  heparin   Injectable 5000 Unit(s) SubCutaneous every 12 hours  levothyroxine 75 MICROGram(s) Oral daily  mirtazapine 15 milliGRAM(s) Oral at bedtime  OLANZapine 5 milliGRAM(s) Oral at bedtime  pantoprazole    Tablet 40 milliGRAM(s) Oral before breakfast  sertraline 150 milliGRAM(s) Oral daily  sodium chloride 0.9%. 1000 milliLiter(s) (60 mL/Hr) IV Continuous <Continuous>    MEDICATIONS  (PRN):  acetaminophen     Tablet .. 650 milliGRAM(s) Oral every 6 hours PRN Temp greater or equal to 38C (100.4F), Mild Pain (1 - 3)  melatonin 3 milliGRAM(s) Oral at bedtime PRN Insomnia  OLANZapine 5 milliGRAM(s) Oral daily PRN agitation  OLANZapine Injectable 5 milliGRAM(s) IntraMuscular every 12 hours PRN agitation and combativeness  ondansetron    Tablet 4 milliGRAM(s) Oral every 6 hours PRN Nausea and/or Vomiting  QUEtiapine 12.5 milliGRAM(s) Oral daily PRN for agitation in am

## 2023-03-19 NOTE — DIETITIAN INITIAL EVALUATION ADULT - PERTINENT LABORATORY DATA
03-18    137  |  106  |  19  ----------------------------<  93  4.6   |  27  |  1.00    Ca    8.6      18 Mar 2023 07:15    POCT Blood Glucose.: 117 mg/dL (03-18-23 @ 17:02)

## 2023-03-19 NOTE — DISCHARGE NOTE PROVIDER - HOSPITAL COURSE
FROM ADMISSION H+P:   HPI:  89 yo F with PMH dementia, Afib not on AC, hypothyroidism, R total hip replacement, GERD, HTN presents to the ED with AMS. Patient combative and uncooperative at time of admission. Son Clarke present at bedside to provide history, Son reports patient was discharged on 3/7 Tuesday and was mostly cooperative, at her mental baseline until about Friday 3/10. Son reports that she has intermittent episodes of agitation and confusion. Today, patient thought she was in [place], called her children and grandchildren every 10 minutes. Patient has been verbally abusive to her HHA. [facility] facility reported that patient was yelling and agitated in the library.     Patient was recently hospitalized at [facility] from 3/4 through 3/7/2023 for AMS, found to have UTI. Patient has 3 more tablets left of PO TMP-SMX. Last dose taken was this AM.     ED Course:   Vitals: BP: 128/73, HR: 71, Temp: 97.7, RR: 20, SpO2: 95% on RA  Labs:  Na 132, AG 4, BUN 24, Cr 1.4, Gluc 107, eGFR 35  UA: pending  Received in the ED quetiapine 25mg x1   (12 Mar 2023 01:10)      ---  HOSPITAL COURSE:   Pt was admitted for dementia with behavioral disturbance. She was evaluated by psychiatry and neurology. Her medications were adjusted. Her mental status and agitation improved. on day of discharge pt is calm and following commands. She is medically optimized for d/c home. Family has 24 hour aide in place.  ---  CONSULTANTS:   Neuro  Psych  -

## 2023-03-19 NOTE — CAREGIVER ENGAGEMENT NOTE - CAREGIVER OUTREACH NOTES - FREE TEXT
Per MD, patient is medically cleared for transition home today. JEANNE spoke to the patient's daughter Poly who confirmed the 24/7 private hire aide is in place for today at 2pm. Confirmed with Poly that the patient had [facility] for home PT PTA and she would like these services to continue. Poly stated she will drive the patient home today. Poly verbalized understanding of the transition plan and is in agreement. JEANNE spoke with MD and requested she put a prescription for home PT in the chart. JEANNE spoke to bedside RN and made aware of the transition plan and script for home PT. CM remains available.

## 2023-04-10 NOTE — DISCHARGE NOTE PROVIDER - DISCHARGING ATTENDING PHYSICIAN:
Petra Rubio Wartpeel Counseling:  I discussed with the patient the risks of Wartpeel including but not limited to erythema, scaling, itching, weeping, crusting, and pain.

## 2023-04-17 NOTE — ED ADULT NURSE NOTE - CADM POA PRESS ULCER
No Opzelura Counseling:  I discussed with the patient the risks of Opzelura including but not limited to nasopharngitis, bronchitis, ear infection, eosinophila, hives, diarrhea, folliculitis, tonsillitis, and rhinorrhea.  Taken orally, this medication has been linked to serious infections; higher rate of mortality; malignancy and lymphoproliferative disorders; major adverse cardiovascular events; thrombosis; thrombocytopenia, anemia, and neutropenia; and lipid elevations.

## 2023-04-24 NOTE — CASE MANAGEMENT PROGRESS NOTE - NSCMPROGRESSNOTE_GEN_ALL_CORE
Patient is for transition back to Independent living. tomorrow.  spoke to patient's son Clarke and daughter Poly. They are in the process of arranging 24hr aide services., they will drive her home. CM spoke to Jenn from [facility], she said hey do not need the discharge instructions, it goes to the family. She added as long that the patient has a /Aide she can go back.  Physical therapist recommended Home PT. As per daughter Poly, patient has a case open with [facility]. Message left for MD to leave a script for PT at home. Poly agree with the transition plan. Patient is for transition back to Independent living tomorrow.  spoke to patient's son Clarke and daughter Poly. They are in the process of arranging 24hr aide services., they will drive her home. CM spoke to Jenn from [facility], she said they do not need the discharge instructions, it goes to the family. She added as long that the patient has a /Aide she can go back.  Physical therapist recommended Home PT. As per daughter Poly, patient has a case open with [facility]. Message left for MD to leave a script for PT at home. Poly agree with the transition plan. Render Risk Assessment In Note?: no Detail Level: Simple Note Text (......Xxx Chief Complaint.): This diagnosis correlates with the Other (Free Text): H/O accutane use. Postmenopausal. Could relate to CPAP mask use but flaring in area not normally covered by mask. Will wait until a few days after finishing ketoconazole to start MCN. Try to taper MCN to daily or office if this deeper acne resolves. Start Differin gel OTC to face.

## 2023-08-14 NOTE — DIETITIAN INITIAL EVALUATION ADULT - PROBLEM SELECTOR PROBLEM 3
Sclerotherapy    Date/Time: 8/14/2023 3:59 PM    Performed by: Jinny Higgins CRNP  Authorized by: Jinny Higgins [facility] [id number]    
UTI (urinary tract infection)

## 2024-02-08 ENCOUNTER — INPATIENT (INPATIENT)
Facility: HOSPITAL | Age: 89
LOS: 5 days | Discharge: HOME | DRG: 871 | End: 2024-02-14
Attending: INTERNAL MEDICINE | Admitting: INTERNAL MEDICINE
Payer: MEDICARE

## 2024-02-08 VITALS
SYSTOLIC BLOOD PRESSURE: 113 MMHG | RESPIRATION RATE: 18 BRPM | DIASTOLIC BLOOD PRESSURE: 61 MMHG | HEART RATE: 97 BPM | TEMPERATURE: 97 F | WEIGHT: 164.91 LBS | OXYGEN SATURATION: 92 %

## 2024-02-08 DIAGNOSIS — Z96.641 PRESENCE OF RIGHT ARTIFICIAL HIP JOINT: Chronic | ICD-10-CM

## 2024-02-08 DIAGNOSIS — B33.8 OTHER SPECIFIED VIRAL DISEASES: ICD-10-CM

## 2024-02-08 DIAGNOSIS — N39.0 URINARY TRACT INFECTION, SITE NOT SPECIFIED: ICD-10-CM

## 2024-02-08 DIAGNOSIS — E03.9 HYPOTHYROIDISM, UNSPECIFIED: ICD-10-CM

## 2024-02-08 DIAGNOSIS — Z90.721 ACQUIRED ABSENCE OF OVARIES, UNILATERAL: Chronic | ICD-10-CM

## 2024-02-08 DIAGNOSIS — K21.9 GASTRO-ESOPHAGEAL REFLUX DISEASE WITHOUT ESOPHAGITIS: ICD-10-CM

## 2024-02-08 DIAGNOSIS — Z96.651 PRESENCE OF RIGHT ARTIFICIAL KNEE JOINT: Chronic | ICD-10-CM

## 2024-02-08 DIAGNOSIS — H26.493 OTHER SECONDARY CATARACT, BILATERAL: Chronic | ICD-10-CM

## 2024-02-08 DIAGNOSIS — F03.90 UNSPECIFIED DEMENTIA, UNSPECIFIED SEVERITY, WITHOUT BEHAVIORAL DISTURBANCE, PSYCHOTIC DISTURBANCE, MOOD DISTURBANCE, AND ANXIETY: ICD-10-CM

## 2024-02-08 DIAGNOSIS — I48.91 UNSPECIFIED ATRIAL FIBRILLATION: ICD-10-CM

## 2024-02-08 DIAGNOSIS — R19.00 INTRA-ABDOMINAL AND PELVIC SWELLING, MASS AND LUMP, UNSPECIFIED SITE: Chronic | ICD-10-CM

## 2024-02-08 DIAGNOSIS — J96.01 ACUTE RESPIRATORY FAILURE WITH HYPOXIA: ICD-10-CM

## 2024-02-08 DIAGNOSIS — Z29.9 ENCOUNTER FOR PROPHYLACTIC MEASURES, UNSPECIFIED: ICD-10-CM

## 2024-02-08 DIAGNOSIS — F41.9 ANXIETY DISORDER, UNSPECIFIED: ICD-10-CM

## 2024-02-08 DIAGNOSIS — Z98.89 OTHER SPECIFIED POSTPROCEDURAL STATES: Chronic | ICD-10-CM

## 2024-02-08 LAB
ALBUMIN SERPL ELPH-MCNC: 3.4 G/DL — SIGNIFICANT CHANGE UP (ref 3.3–5)
ALP SERPL-CCNC: 77 U/L — SIGNIFICANT CHANGE UP (ref 40–120)
ALT FLD-CCNC: 16 U/L — SIGNIFICANT CHANGE UP (ref 12–78)
ANION GAP SERPL CALC-SCNC: 8 MMOL/L — SIGNIFICANT CHANGE UP (ref 5–17)
APPEARANCE UR: CLEAR — SIGNIFICANT CHANGE UP
APTT BLD: 28.6 SEC — SIGNIFICANT CHANGE UP (ref 24.5–35.6)
AST SERPL-CCNC: 16 U/L — SIGNIFICANT CHANGE UP (ref 15–37)
BASOPHILS # BLD AUTO: 0.02 K/UL — SIGNIFICANT CHANGE UP (ref 0–0.2)
BASOPHILS NFR BLD AUTO: 0.4 % — SIGNIFICANT CHANGE UP (ref 0–2)
BILIRUB SERPL-MCNC: 0.5 MG/DL — SIGNIFICANT CHANGE UP (ref 0.2–1.2)
BILIRUB UR-MCNC: NEGATIVE — SIGNIFICANT CHANGE UP
BUN SERPL-MCNC: 20 MG/DL — SIGNIFICANT CHANGE UP (ref 7–23)
CALCIUM SERPL-MCNC: 8 MG/DL — LOW (ref 8.5–10.1)
CHLORIDE SERPL-SCNC: 109 MMOL/L — HIGH (ref 96–108)
CO2 SERPL-SCNC: 23 MMOL/L — SIGNIFICANT CHANGE UP (ref 22–31)
COLOR SPEC: YELLOW — SIGNIFICANT CHANGE UP
CREAT SERPL-MCNC: 0.95 MG/DL — SIGNIFICANT CHANGE UP (ref 0.5–1.3)
DIFF PNL FLD: ABNORMAL
EGFR: 55 ML/MIN/1.73M2 — LOW
EOSINOPHIL # BLD AUTO: 0.25 K/UL — SIGNIFICANT CHANGE UP (ref 0–0.5)
EOSINOPHIL NFR BLD AUTO: 5.3 % — SIGNIFICANT CHANGE UP (ref 0–6)
FLUAV AG NPH QL: SIGNIFICANT CHANGE UP
FLUBV AG NPH QL: SIGNIFICANT CHANGE UP
GLUCOSE SERPL-MCNC: 144 MG/DL — HIGH (ref 70–99)
GLUCOSE UR QL: NEGATIVE MG/DL — SIGNIFICANT CHANGE UP
HCT VFR BLD CALC: 43 % — SIGNIFICANT CHANGE UP (ref 34.5–45)
HGB BLD-MCNC: 14.2 G/DL — SIGNIFICANT CHANGE UP (ref 11.5–15.5)
IMM GRANULOCYTES NFR BLD AUTO: 0.4 % — SIGNIFICANT CHANGE UP (ref 0–0.9)
INR BLD: 1.01 RATIO — SIGNIFICANT CHANGE UP (ref 0.85–1.18)
KETONES UR-MCNC: NEGATIVE MG/DL — SIGNIFICANT CHANGE UP
LACTATE SERPL-SCNC: 1.1 MMOL/L — SIGNIFICANT CHANGE UP (ref 0.7–2)
LEUKOCYTE ESTERASE UR-ACNC: ABNORMAL
LYMPHOCYTES # BLD AUTO: 1.2 K/UL — SIGNIFICANT CHANGE UP (ref 1–3.3)
LYMPHOCYTES # BLD AUTO: 25.4 % — SIGNIFICANT CHANGE UP (ref 13–44)
MAGNESIUM SERPL-MCNC: 2.2 MG/DL — SIGNIFICANT CHANGE UP (ref 1.6–2.6)
MCHC RBC-ENTMCNC: 31.4 PG — SIGNIFICANT CHANGE UP (ref 27–34)
MCHC RBC-ENTMCNC: 33 GM/DL — SIGNIFICANT CHANGE UP (ref 32–36)
MCV RBC AUTO: 95.1 FL — SIGNIFICANT CHANGE UP (ref 80–100)
MONOCYTES # BLD AUTO: 0.39 K/UL — SIGNIFICANT CHANGE UP (ref 0–0.9)
MONOCYTES NFR BLD AUTO: 8.3 % — SIGNIFICANT CHANGE UP (ref 2–14)
NEUTROPHILS # BLD AUTO: 2.84 K/UL — SIGNIFICANT CHANGE UP (ref 1.8–7.4)
NEUTROPHILS NFR BLD AUTO: 60.2 % — SIGNIFICANT CHANGE UP (ref 43–77)
NITRITE UR-MCNC: NEGATIVE — SIGNIFICANT CHANGE UP
NRBC # BLD: 0 /100 WBCS — SIGNIFICANT CHANGE UP (ref 0–0)
NT-PROBNP SERPL-SCNC: 454 PG/ML — HIGH (ref 0–450)
PH UR: 5.5 — SIGNIFICANT CHANGE UP (ref 5–8)
PLATELET # BLD AUTO: 165 K/UL — SIGNIFICANT CHANGE UP (ref 150–400)
POTASSIUM SERPL-MCNC: 3.5 MMOL/L — SIGNIFICANT CHANGE UP (ref 3.5–5.3)
POTASSIUM SERPL-SCNC: 3.5 MMOL/L — SIGNIFICANT CHANGE UP (ref 3.5–5.3)
PROT SERPL-MCNC: 8 G/DL — SIGNIFICANT CHANGE UP (ref 6–8.3)
PROT UR-MCNC: NEGATIVE MG/DL — SIGNIFICANT CHANGE UP
PROTHROM AB SERPL-ACNC: 11.8 SEC — SIGNIFICANT CHANGE UP (ref 9.5–13)
RBC # BLD: 4.52 M/UL — SIGNIFICANT CHANGE UP (ref 3.8–5.2)
RBC # FLD: 14.9 % — HIGH (ref 10.3–14.5)
RSV RNA NPH QL NAA+NON-PROBE: DETECTED
SARS-COV-2 RNA SPEC QL NAA+PROBE: SIGNIFICANT CHANGE UP
SODIUM SERPL-SCNC: 140 MMOL/L — SIGNIFICANT CHANGE UP (ref 135–145)
SP GR SPEC: 1.01 — SIGNIFICANT CHANGE UP (ref 1–1.03)
TROPONIN I, HIGH SENSITIVITY RESULT: 139.2 NG/L — HIGH
TSH SERPL-MCNC: 1.59 UIU/ML — SIGNIFICANT CHANGE UP (ref 0.36–3.74)
UROBILINOGEN FLD QL: 0.2 MG/DL — SIGNIFICANT CHANGE UP (ref 0.2–1)
WBC # BLD: 4.72 K/UL — SIGNIFICANT CHANGE UP (ref 3.8–10.5)
WBC # FLD AUTO: 4.72 K/UL — SIGNIFICANT CHANGE UP (ref 3.8–10.5)

## 2024-02-08 PROCEDURE — 71045 X-RAY EXAM CHEST 1 VIEW: CPT | Mod: 26

## 2024-02-08 PROCEDURE — 99285 EMERGENCY DEPT VISIT HI MDM: CPT | Mod: FS

## 2024-02-08 PROCEDURE — 93010 ELECTROCARDIOGRAM REPORT: CPT

## 2024-02-08 PROCEDURE — 71275 CT ANGIOGRAPHY CHEST: CPT | Mod: 26

## 2024-02-08 PROCEDURE — 99223 1ST HOSP IP/OBS HIGH 75: CPT | Mod: GC

## 2024-02-08 PROCEDURE — 71250 CT THORAX DX C-: CPT | Mod: 26,QQ,59

## 2024-02-08 RX ORDER — AZITHROMYCIN 500 MG/1
500 TABLET, FILM COATED ORAL EVERY 24 HOURS
Refills: 0 | Status: DISCONTINUED | OUTPATIENT
Start: 2024-02-09 | End: 2024-02-08

## 2024-02-08 RX ORDER — LEVOTHYROXINE SODIUM 125 MCG
75 TABLET ORAL DAILY
Refills: 0 | Status: DISCONTINUED | OUTPATIENT
Start: 2024-02-08 | End: 2024-02-14

## 2024-02-08 RX ORDER — SODIUM CHLORIDE 9 MG/ML
500 INJECTION INTRAMUSCULAR; INTRAVENOUS; SUBCUTANEOUS ONCE
Refills: 0 | Status: COMPLETED | OUTPATIENT
Start: 2024-02-08 | End: 2024-02-08

## 2024-02-08 RX ORDER — LEVOTHYROXINE SODIUM 125 MCG
1 TABLET ORAL
Qty: 0 | Refills: 0 | DISCHARGE

## 2024-02-08 RX ORDER — AZITHROMYCIN 500 MG/1
500 TABLET, FILM COATED ORAL ONCE
Refills: 0 | Status: COMPLETED | OUTPATIENT
Start: 2024-02-08 | End: 2024-02-08

## 2024-02-08 RX ORDER — IPRATROPIUM/ALBUTEROL SULFATE 18-103MCG
3 AEROSOL WITH ADAPTER (GRAM) INHALATION ONCE
Refills: 0 | Status: COMPLETED | OUTPATIENT
Start: 2024-02-08 | End: 2024-02-08

## 2024-02-08 RX ORDER — FUROSEMIDE 40 MG
20 TABLET ORAL DAILY
Refills: 0 | Status: DISCONTINUED | OUTPATIENT
Start: 2024-02-08 | End: 2024-02-14

## 2024-02-08 RX ORDER — CEFTRIAXONE 500 MG/1
1000 INJECTION, POWDER, FOR SOLUTION INTRAMUSCULAR; INTRAVENOUS EVERY 24 HOURS
Refills: 0 | Status: DISCONTINUED | OUTPATIENT
Start: 2024-02-09 | End: 2024-02-12

## 2024-02-08 RX ORDER — ONDANSETRON 8 MG/1
4 TABLET, FILM COATED ORAL EVERY 6 HOURS
Refills: 0 | Status: DISCONTINUED | OUTPATIENT
Start: 2024-02-08 | End: 2024-02-14

## 2024-02-08 RX ORDER — DILTIAZEM HCL 120 MG
300 CAPSULE, EXT RELEASE 24 HR ORAL DAILY
Refills: 0 | Status: DISCONTINUED | OUTPATIENT
Start: 2024-02-08 | End: 2024-02-14

## 2024-02-08 RX ORDER — POTASSIUM CHLORIDE 20 MEQ
10 PACKET (EA) ORAL DAILY
Refills: 0 | Status: DISCONTINUED | OUTPATIENT
Start: 2024-02-08 | End: 2024-02-14

## 2024-02-08 RX ORDER — METHOTREXATE 2.5 MG/1
1 TABLET ORAL
Qty: 0 | Refills: 0 | DISCHARGE

## 2024-02-08 RX ORDER — AZITHROMYCIN 500 MG/1
500 TABLET, FILM COATED ORAL EVERY 24 HOURS
Refills: 0 | Status: DISCONTINUED | OUTPATIENT
Start: 2024-02-09 | End: 2024-02-12

## 2024-02-08 RX ORDER — FOLIC ACID 0.8 MG
1 TABLET ORAL
Qty: 0 | Refills: 0 | DISCHARGE

## 2024-02-08 RX ORDER — QUETIAPINE FUMARATE 200 MG/1
12.5 TABLET, FILM COATED ORAL
Refills: 0 | Status: DISCONTINUED | OUTPATIENT
Start: 2024-02-08 | End: 2024-02-14

## 2024-02-08 RX ORDER — DILTIAZEM HCL 120 MG
1 CAPSULE, EXT RELEASE 24 HR ORAL
Qty: 0 | Refills: 0 | DISCHARGE

## 2024-02-08 RX ORDER — FERROUS SULFATE 325(65) MG
325 TABLET ORAL DAILY
Refills: 0 | Status: DISCONTINUED | OUTPATIENT
Start: 2024-02-08 | End: 2024-02-14

## 2024-02-08 RX ORDER — METHENAMINE MANDELATE 1 G
1 TABLET ORAL
Refills: 0 | DISCHARGE

## 2024-02-08 RX ORDER — PANTOPRAZOLE SODIUM 20 MG/1
1 TABLET, DELAYED RELEASE ORAL
Qty: 0 | Refills: 0 | DISCHARGE

## 2024-02-08 RX ORDER — FERROUS SULFATE 325(65) MG
1 TABLET ORAL
Refills: 0 | DISCHARGE

## 2024-02-08 RX ORDER — QUETIAPINE FUMARATE 200 MG/1
50 TABLET, FILM COATED ORAL AT BEDTIME
Refills: 0 | Status: DISCONTINUED | OUTPATIENT
Start: 2024-02-08 | End: 2024-02-14

## 2024-02-08 RX ORDER — FOLIC ACID 0.8 MG
1 TABLET ORAL DAILY
Refills: 0 | Status: DISCONTINUED | OUTPATIENT
Start: 2024-02-08 | End: 2024-02-14

## 2024-02-08 RX ORDER — FERROUS SULFATE 325(65) MG
200 TABLET ORAL DAILY
Refills: 0 | Status: DISCONTINUED | OUTPATIENT
Start: 2024-02-08 | End: 2024-02-08

## 2024-02-08 RX ORDER — ACETAMINOPHEN 500 MG
650 TABLET ORAL EVERY 6 HOURS
Refills: 0 | Status: DISCONTINUED | OUTPATIENT
Start: 2024-02-08 | End: 2024-02-14

## 2024-02-08 RX ORDER — CEFTRIAXONE 500 MG/1
1000 INJECTION, POWDER, FOR SOLUTION INTRAMUSCULAR; INTRAVENOUS ONCE
Refills: 0 | Status: COMPLETED | OUTPATIENT
Start: 2024-02-08 | End: 2024-02-08

## 2024-02-08 RX ORDER — PANTOPRAZOLE SODIUM 20 MG/1
40 TABLET, DELAYED RELEASE ORAL
Refills: 0 | Status: DISCONTINUED | OUTPATIENT
Start: 2024-02-08 | End: 2024-02-14

## 2024-02-08 RX ORDER — SERTRALINE 25 MG/1
150 TABLET, FILM COATED ORAL DAILY
Refills: 0 | Status: DISCONTINUED | OUTPATIENT
Start: 2024-02-08 | End: 2024-02-14

## 2024-02-08 RX ORDER — IPRATROPIUM/ALBUTEROL SULFATE 18-103MCG
3 AEROSOL WITH ADAPTER (GRAM) INHALATION EVERY 6 HOURS
Refills: 0 | Status: DISCONTINUED | OUTPATIENT
Start: 2024-02-08 | End: 2024-02-14

## 2024-02-08 RX ORDER — QUETIAPINE FUMARATE 200 MG/1
1 TABLET, FILM COATED ORAL
Refills: 0 | DISCHARGE

## 2024-02-08 RX ORDER — QUETIAPINE FUMARATE 200 MG/1
0.5 TABLET, FILM COATED ORAL
Refills: 0 | DISCHARGE

## 2024-02-08 RX ORDER — FUROSEMIDE 40 MG
1 TABLET ORAL
Refills: 0 | DISCHARGE

## 2024-02-08 RX ORDER — POTASSIUM CHLORIDE 20 MEQ
1 PACKET (EA) ORAL
Refills: 0 | DISCHARGE

## 2024-02-08 RX ADMIN — Medication 3 MILLILITER(S): at 15:50

## 2024-02-08 RX ADMIN — CEFTRIAXONE 100 MILLIGRAM(S): 500 INJECTION, POWDER, FOR SOLUTION INTRAMUSCULAR; INTRAVENOUS at 18:21

## 2024-02-08 RX ADMIN — Medication 125 MILLIGRAM(S): at 15:54

## 2024-02-08 RX ADMIN — SODIUM CHLORIDE 500 MILLILITER(S): 9 INJECTION INTRAMUSCULAR; INTRAVENOUS; SUBCUTANEOUS at 15:51

## 2024-02-08 RX ADMIN — AZITHROMYCIN 255 MILLIGRAM(S): 500 TABLET, FILM COATED ORAL at 21:05

## 2024-02-08 RX ADMIN — QUETIAPINE FUMARATE 50 MILLIGRAM(S): 200 TABLET, FILM COATED ORAL at 22:25

## 2024-02-08 NOTE — H&P ADULT - ATTENDING COMMENTS
89 year old woman with hx of dementia, afib not on AC, hypothyroidism, frequent UTIs, GERD, and R total hip and knee replacement and presenting with about four days of a cough and wheezing. admitted for  sepsis sec to RSV+,  acute hypoxic respiratory failure and r/o acs Plan: apprec cardio and pulm recs, monitor clinical course, asp precautions, f.u cultures, consider ID if not improving, supportive care at this time, check pct

## 2024-02-08 NOTE — H&P ADULT - NSICDXPASTSURGICALHX_GEN_ALL_CORE_FT
PAST SURGICAL HISTORY:  Abdominal wall mass Exp La, Excison of abdominal mass - 8/2015    After cataract not obscuring vision, bilateral b/l lens implants    H/O total knee replacement, right     History of total hip replacement, right     S/P oophorectomy     S/P tonsillectomy

## 2024-02-08 NOTE — ED ADULT NURSE NOTE - OBJECTIVE STATEMENT
Received pt in room 4A, 89 yr/o female A+OX3 forgetful at time. pt is ambulating with walker at baseline. non ambulatory at this time. pt was sent to the ED by PCP for R/O pneumonia. pt has an audible wheeze noted at this time. infrequent dry cough noted. pt is 88 on RA, 2L NC applied tolerating well satting at 95. PERRLA and facial symmetry noted. pt denies numbness, tingling, and weakness in peripheral extremities. abdomen is flat, soft, nontender; denies nausea, vomiting, diarrhea, and constipation. pt has active and passive ROM of all extremities, no obvious joint deformities noted. skin is clean dry and intact. right AC 20g place, labs drawn and sent. meds given as ordered. pending ct results.

## 2024-02-08 NOTE — H&P ADULT - PROBLEM SELECTOR PLAN 4
Chronic:  - EKG: A flutter with variable AV block, HR 86, QTc 461  - c/w cardizem 300mg daily  - c/w Lasix 20mg daily for leg swelling   - currently rate controlled  - pt not on AC   - continue to monitor on telemetry Chronic:  - EKG: A flutter with variable AV block, HR 86, QTc 461  - c/w cardizem 300mg daily  - c/w Lasix 20mg daily for leg swelling   - currently rate controlled  - pt not on AC   - continue to monitor on telemetry  - repeat EKG ordered for AM   - F/u TTE acute on Chronic:  - EKG: A flutter with variable AV block, HR 86, QTc 461  - c/w cardizem 300mg daily  - c/w Lasix 20mg daily for leg swelling   - currently rate controlled  - pt not on AC   - continue to monitor on telemetry  - repeat EKG ordered for AM   - F/u TTE

## 2024-02-08 NOTE — H&P ADULT - ASSESSMENT
89 year old woman with hx of dementia, afib not on AC, hypothyroidism, frequent UTIs, GERD, and R total hip and knee replacement and presenting with about four days of a cough and wheezing. Found to be RSV+, admitted for acute hypoxic respiratory failure.  89 year old woman with hx of dementia, afib not on AC, hypothyroidism, frequent UTIs, GERD, and R total hip and knee replacement and presenting with about four days of a cough and wheezing. admitted for  sepsis sec to RSV+,  acute hypoxic respiratory failure and r/o acs

## 2024-02-08 NOTE — H&P ADULT - PROBLEM SELECTOR PLAN 2
Pt with frequent UTI  - UA grossly unremarkable, pt without urinary sx on admission  - hold home methenamine, pt receiving IV abx   - F/u urine cx

## 2024-02-08 NOTE — H&P ADULT - HISTORY OF PRESENT ILLNESS
89 year old woman with hx of dementia, afib not on AC, hypothyroidism, frequent UTIs, GERD, and R total hip and knee replacement and presenting with about four days of a cough and wheezing. Pt is accompanied by daughter who is providing some of the history. Pt lives at the [facility] in [place], [place]. Per daughter, pt has been coughing, with some wheezing since this past weekend. Pt visited her PCP, who started her on doxycycline however it was not well tolerated so pt discontinued it. Pt was not improving so daughter brought pt to ED. Pt has not taken any other medications for symptom relieve. Of note there are a few COVID + cases at the pt's residence. Denies SOB at rest, dizziness, subjective fevers, chest pain, back pain, or pain or burning on urination,     ED course  Vitals: T 97.4F, HR 97, /61, RR 18, SpO2 92% on RA  Significant labs: Cl 109, glu 144, trop 139, proBNP 454 UA with trace leuk est, trace blood, few bacteria, RSV positive  Imaging:   - CXR small hiatal hernia, chronic thoracolumbar scoliosis and degenerative changes of the spine as well as chornic arthropathy of both shoulders  - CT Chest: Bilateral mild bronchiectasis as on June 29, 2019 with superimposed mild impacted distal and dilated airways may be due to internal secretions.  - CT Angio: no PE  EKG: A flutter with variable AV block, HR 86, QTc 461   In ED patient given: ceftriaxone 1000mg x1, azithromycin 500mg x1, solumedrol 125mg IVP, 500mL NS bolus x1, Duoneb x2

## 2024-02-08 NOTE — H&P ADULT - NSHPLABSRESULTS_GEN_ALL_CORE
< from: CT Chest No Cont (02.08.24 @ 17:04) >    IMPRESSION:    Bilateral mild bronchiectasis as on June 29, 2019 with superimposed mild   impacted distal and dilated airways may be due to internal secretions.    --- End of Report ---    < end of copied text >    < from: CT Angio Chest PE Protocol w/ IV Cont (02.08.24 @ 19:15) >    IMPRESSION:  No pulmonary embolus.  Mild bronchiectasis and impacted distal airways as previously described        --- End of Report ---    < end of copied text >    < from: Xray Chest 1 View- PORTABLE-Urgent (02.08.24 @ 15:44) >    IMPRESSION:  1. Small hiatus hernia.  2. No evidence of pneumonia, pleural effusion or CHF.  3. Chronic thoracolumbar scoliosis and degenerative changes of the spine   as well as chronic arthropathy of both shoulders.    --- End of Report ---    < end of copied text >

## 2024-02-08 NOTE — H&P ADULT - NSHPSOCIALHISTORY_GEN_ALL_CORE
Tobacco: denies  EtOH: denies  Recreational drug use: denies  Lives at assisted living facility  Ambulates: with assistance and with a walker  ADLs: has HHA 12 hrs per day

## 2024-02-08 NOTE — H&P ADULT - PROBLEM SELECTOR PLAN 3
Chronic, pt follows with Dr. Derian Whiting (neurologist)   - c/w Seroquel 12.5mg in AM and 50mg in PM Chronic, pt follows with Dr. Derian Whiting (neurologist)   - c/w Seroquel 12.5mg in AM and 50mg in PM for delirium/agitation Chronic, pt follows with Dr. Derian Whiting (neuro varghese)   - c/w Seroquel 12.5mg in AM and 50mg in PM for delirium/agitation

## 2024-02-08 NOTE — H&P ADULT - PROBLEM SELECTOR PLAN 7
Chronic:  - c/w pantoprazole 40mg daily    #Supplements:  - c/w folic acid and ferrous sulfate supplementation

## 2024-02-08 NOTE — ED PROVIDER NOTE - ATTENDING APP SHARED VISIT CONTRIBUTION OF CARE
Tim Peña MD (Attending Physician):    I performed a history and physical exam of the patient and discussed their management with the MONTY. I have reviewed the MONTY note and agree with the documented findings and plan of care, except as noted. This was a shared visit with an MONTY. I reviewed and verified the documentation and independently performed my own history/exam/medical decision making. My medical decision making and observations are found below. Please refer to any progress notes for updates on clinical course.    HPI:  89-year-old female with history of A-fib, OA, anxiety, GERD, HTN, hypothyroidism presents with complaint of cough, congestion, wheezing and shortness of breath.  As per son at bedside, patient has had cough and nasal congestion since 2/4, was seen at urgent care the next day, 2/5 and had negative chest x-ray/flu/COVID swab and was prescribed doxycycline.  States that patient had vomiting after taking a dose of doxycycline hence stopped taking it.  States that patient was noted to have wheezing with shortness of breath on exertion today, was seen by PCP, Dr. Steven Goldberg and had chest x-ray in office which showed pneumonia with low BP and sent to ER for further evaluation.  Denies fever, chest pain, abdominal pain, recent travel, sick contacts or other symptoms.    PE:  GEN - NAD, well appearing, A&Ox3  HEAD - NC/AT  EYES - PERRL, EOMI  ENT - Airway patent, +mucous membranes dry  PULMONARY - +Mild diffuse wheezing, mildly increased wob, satting 88% on RA  CARDIAC - +S1S2, RRR, no M/G/R, no JVD  ABDOMEN - +BS, ND, NT, soft, no guarding, no rebound, no masses, no rigidity   - No CVA TTP b/l  EXTREMITIES - FROM, symmetric pulses, no LE edema b/l  SKIN - No rash or bruising  NEUROLOGIC - Alert, speech clear, no focal deficits  PSYCH - Normal mood/affect, normal insight    MDM:  DDx includes, but not limited to: PNA, viral syndrome, PE, PTX, ACS, new CHF, new COPD. ekg, cxr, CTA chest, labs, urine, supplemental O2, duonebs, Solu-medrol, IVF. Will most likely require admission.

## 2024-02-08 NOTE — ED ADULT NURSE NOTE - NSFALLRISKINTERV_ED_ALL_ED
Assistance OOB with selected safe patient handling equipment if applicable/Assistance with ambulation/Communicate fall risk and risk factors to all staff, patient, and family/Provide patient with walking aids/Provide visual cue: yellow wristband, yellow gown, etc/Reinforce activity limits and safety measures with patient and family/Call bell, personal items and telephone in reach/Instruct patient to call for assistance before getting out of bed/chair/stretcher/Non-slip footwear applied when patient is off stretcher/[place] to call system/Physically safe environment - no spills, clutter or unnecessary equipment/Purposeful Proactive Rounding/Room/bathroom lighting operational, light cord in reach

## 2024-02-08 NOTE — ED PROVIDER NOTE - PROGRESS NOTE DETAILS
Spoke to attending, Dr. Cantrell who accepted admission. Informed attending cardiologist, Dr. Mendoza about elevated trop, will consult pt tomorrow.

## 2024-02-08 NOTE — ED PROVIDER NOTE - CARE PLAN
Principal Discharge DX:	Respiratory syncytial virus (RSV) infection  Secondary Diagnosis:	Hypoxia   1

## 2024-02-08 NOTE — H&P ADULT - NSICDXPASTMEDICALHX_GEN_ALL_CORE_FT
PAST MEDICAL HISTORY:  Atrial fibrillation     Degenerative disc disease, cervical     Degenerative disc disease, lumbar     GERD (gastroesophageal reflux disease)     Hypothyroidism, adult     OA (osteoarthritis)     Sarcoma of omentum

## 2024-02-08 NOTE — H&P ADULT - NSHPPHYSICALEXAM_GEN_ALL_CORE
T(C): 36.3 (02-08-24 @ 14:04), Max: 36.3 (02-08-24 @ 14:04)  HR: 97 (02-08-24 @ 14:04) (97 - 97)  BP: 113/61 (02-08-24 @ 14:04) (113/61 - 113/61)  RR: 18 (02-08-24 @ 14:04) (18 - 18)  SpO2: 92% (02-08-24 @ 14:04) (92% - 92%)    GENERAL: patient appears well, no acute distress, appropriately interactive  EYES: sclera clear, no exudates  ENMT: oropharynx clear without erythema, no exudates, moist mucous membranes  NECK: supple, soft  LUNGS: intermittent inspiratory and expiratory wheezing b/l throughout lung fields, good air entry bilaterally, symmetric breath sounds, no rhonchi or other adventitious sounds appreciated  HEART: sinus tachycardia, soft S1/S2, no murmurs noted, mild non-pitting lower extremity edema  GASTROINTESTINAL: abdomen is soft, nontender, nondistended, normoactive bowel sounds  INTEGUMENT: good skin turgor, warm skin, appears well perfused  MUSCULOSKELETAL: no clubbing or cyanosis, no obvious deformity  NEUROLOGIC: awake, alert, oriented x3, good muscle tone in all 4 extremities  HEME/LYMPH: no obvious ecchymosis or petechiae

## 2024-02-08 NOTE — ED PROVIDER NOTE - OBJECTIVE STATEMENT
89-year-old female with history of A-fib, OA, anxiety, GERD, HTN, hypothyroidism presents with complaint of cough, congestion, wheezing and shortness of breath.  As per son at bedside, patient has had cough and nasal congestion since 2/4, was seen at urgent care the next day, 2/5 and had negative chest x-ray/flu/COVID swab and was prescribed doxycycline.  States that patient had vomiting after taking a dose of doxycycline hence stopped taking it.  States that patient was noted to have wheezing with shortness of breath on exertion today, was seen by PCP, Dr. Steven Goldberg and had chest x-ray in office which showed pneumonia with low BP and sent to ER for further evaluation.  Denies fever, chest pain, abdominal pain, recent travel, sick contacts or other symptoms.

## 2024-02-09 LAB
ALBUMIN SERPL ELPH-MCNC: 3.3 G/DL — SIGNIFICANT CHANGE UP (ref 3.3–5)
ALP SERPL-CCNC: 72 U/L — SIGNIFICANT CHANGE UP (ref 40–120)
ALT FLD-CCNC: 19 U/L — SIGNIFICANT CHANGE UP (ref 12–78)
ANION GAP SERPL CALC-SCNC: 8 MMOL/L — SIGNIFICANT CHANGE UP (ref 5–17)
AST SERPL-CCNC: 16 U/L — SIGNIFICANT CHANGE UP (ref 15–37)
BASOPHILS # BLD AUTO: 0 K/UL — SIGNIFICANT CHANGE UP (ref 0–0.2)
BASOPHILS NFR BLD AUTO: 0 % — SIGNIFICANT CHANGE UP (ref 0–2)
BILIRUB SERPL-MCNC: 0.4 MG/DL — SIGNIFICANT CHANGE UP (ref 0.2–1.2)
BLOOD GAS COMMENTS ARTERIAL: SIGNIFICANT CHANGE UP
BUN SERPL-MCNC: 22 MG/DL — SIGNIFICANT CHANGE UP (ref 7–23)
CALCIUM SERPL-MCNC: 8 MG/DL — LOW (ref 8.5–10.1)
CHLORIDE SERPL-SCNC: 108 MMOL/L — SIGNIFICANT CHANGE UP (ref 96–108)
CO2 SERPL-SCNC: 23 MMOL/L — SIGNIFICANT CHANGE UP (ref 22–31)
CREAT SERPL-MCNC: 0.84 MG/DL — SIGNIFICANT CHANGE UP (ref 0.5–1.3)
CULTURE RESULTS: NO GROWTH — SIGNIFICANT CHANGE UP
EGFR: 64 ML/MIN/1.73M2 — SIGNIFICANT CHANGE UP
EOSINOPHIL # BLD AUTO: 0 K/UL — SIGNIFICANT CHANGE UP (ref 0–0.5)
EOSINOPHIL NFR BLD AUTO: 0 % — SIGNIFICANT CHANGE UP (ref 0–6)
GAS PNL BLDA: SIGNIFICANT CHANGE UP
GLUCOSE SERPL-MCNC: 165 MG/DL — HIGH (ref 70–99)
HCT VFR BLD CALC: 38.1 % — SIGNIFICANT CHANGE UP (ref 34.5–45)
HGB BLD-MCNC: 12.8 G/DL — SIGNIFICANT CHANGE UP (ref 11.5–15.5)
LYMPHOCYTES # BLD AUTO: 0.6 K/UL — LOW (ref 1–3.3)
LYMPHOCYTES # BLD AUTO: 18 % — SIGNIFICANT CHANGE UP (ref 13–44)
MCHC RBC-ENTMCNC: 31.4 PG — SIGNIFICANT CHANGE UP (ref 27–34)
MCHC RBC-ENTMCNC: 33.6 GM/DL — SIGNIFICANT CHANGE UP (ref 32–36)
MCV RBC AUTO: 93.6 FL — SIGNIFICANT CHANGE UP (ref 80–100)
MONOCYTES # BLD AUTO: 0.1 K/UL — SIGNIFICANT CHANGE UP (ref 0–0.9)
MONOCYTES NFR BLD AUTO: 3 % — SIGNIFICANT CHANGE UP (ref 2–14)
NEUTROPHILS # BLD AUTO: 2.52 K/UL — SIGNIFICANT CHANGE UP (ref 1.8–7.4)
NEUTROPHILS NFR BLD AUTO: 75 % — SIGNIFICANT CHANGE UP (ref 43–77)
NRBC # BLD: SIGNIFICANT CHANGE UP /100 WBCS (ref 0–0)
PLATELET # BLD AUTO: 154 K/UL — SIGNIFICANT CHANGE UP (ref 150–400)
POTASSIUM SERPL-MCNC: 3.8 MMOL/L — SIGNIFICANT CHANGE UP (ref 3.5–5.3)
POTASSIUM SERPL-SCNC: 3.8 MMOL/L — SIGNIFICANT CHANGE UP (ref 3.5–5.3)
PROCALCITONIN SERPL-MCNC: 0.09 NG/ML — HIGH
PROT SERPL-MCNC: 7.1 G/DL — SIGNIFICANT CHANGE UP (ref 6–8.3)
RBC # BLD: 4.07 M/UL — SIGNIFICANT CHANGE UP (ref 3.8–5.2)
RBC # FLD: 14.8 % — HIGH (ref 10.3–14.5)
SODIUM SERPL-SCNC: 139 MMOL/L — SIGNIFICANT CHANGE UP (ref 135–145)
SPECIMEN SOURCE: SIGNIFICANT CHANGE UP
TROPONIN I, HIGH SENSITIVITY RESULT: 133.5 NG/L — HIGH
WBC # BLD: 3.32 K/UL — LOW (ref 3.8–10.5)
WBC # FLD AUTO: 3.32 K/UL — LOW (ref 3.8–10.5)

## 2024-02-09 PROCEDURE — 99223 1ST HOSP IP/OBS HIGH 75: CPT

## 2024-02-09 PROCEDURE — 93010 ELECTROCARDIOGRAM REPORT: CPT

## 2024-02-09 RX ADMIN — Medication 300 MILLIGRAM(S): at 06:20

## 2024-02-09 RX ADMIN — Medication 1 MILLIGRAM(S): at 11:04

## 2024-02-09 RX ADMIN — Medication 75 MICROGRAM(S): at 06:20

## 2024-02-09 RX ADMIN — Medication 3 MILLILITER(S): at 14:14

## 2024-02-09 RX ADMIN — Medication 3 MILLILITER(S): at 20:41

## 2024-02-09 RX ADMIN — Medication 20 MILLIGRAM(S): at 06:20

## 2024-02-09 RX ADMIN — Medication 325 MILLIGRAM(S): at 11:04

## 2024-02-09 RX ADMIN — Medication 3 MILLILITER(S): at 00:58

## 2024-02-09 RX ADMIN — QUETIAPINE FUMARATE 50 MILLIGRAM(S): 200 TABLET, FILM COATED ORAL at 21:17

## 2024-02-09 RX ADMIN — Medication 3 MILLILITER(S): at 08:34

## 2024-02-09 RX ADMIN — QUETIAPINE FUMARATE 12.5 MILLIGRAM(S): 200 TABLET, FILM COATED ORAL at 08:32

## 2024-02-09 RX ADMIN — AZITHROMYCIN 255 MILLIGRAM(S): 500 TABLET, FILM COATED ORAL at 21:23

## 2024-02-09 RX ADMIN — CEFTRIAXONE 100 MILLIGRAM(S): 500 INJECTION, POWDER, FOR SOLUTION INTRAMUSCULAR; INTRAVENOUS at 17:29

## 2024-02-09 RX ADMIN — SERTRALINE 150 MILLIGRAM(S): 25 TABLET, FILM COATED ORAL at 11:04

## 2024-02-09 RX ADMIN — Medication 10 MILLIEQUIVALENT(S): at 11:04

## 2024-02-09 RX ADMIN — PANTOPRAZOLE SODIUM 40 MILLIGRAM(S): 20 TABLET, DELAYED RELEASE ORAL at 06:37

## 2024-02-09 NOTE — CARE COORDINATION ASSESSMENT. - CAREGIVER NAME
1.  Your Covid test will be available in approximately 2 to 4 days. 2.  The CT scans did not show any features that were concerning for Covid, it is still possible that you may have the coronavirus so he should keep yourself in isolation until your test is available. Your oxygen levels are very good. 3.  Start taking the antibiotics as prescribed. You should not drink any alcohol while taking the metronidazole. 4.  As discussed, follow-up with your primary care physician. As discussed with you that mass on your kidney has not changed in size over time. Discussed with your primary doctor when you may need follow-up for this. 5.  Return if you develop worsening symptoms including severe chest pain, passing out, uncontrolled fever or uncontrolled vomiting or any other symptoms that concern you.
Clarke Lazar [phone]

## 2024-02-09 NOTE — CAREGIVER ENGAGEMENT NOTE - CAREGIVER OUTREACH NOTES - FREE TEXT
CM met patient at bedside who was alert to person only. CM called caregiver Explained role of CM to caregiver, verbalized understanding. Pt caregiver was made aware a CM will remain available through hospitalization.  Contact information given in discharge/ transitions resource folder.

## 2024-02-09 NOTE — PATIENT CHOICE NOTE. - NSPTCHOICENOTES_GEN_ALL_CORE
Met patient at bedside who was alert to person only. CM spoke with son Clarke who stated he would like to use [facility] [phone]

## 2024-02-09 NOTE — CONSULT NOTE ADULT - ASSESSMENT
Assessment/Plan:   This is a 89 y/o F with Afib/flutter (not on AC), hypothyroidism, GERD, frequent UTI's, Rt total hip/knee replacement, and dementia presented with AHRF, found to have mildly elevated troponins    Elevated Troponin  - Patient has no known Hx of CAD  - Presented with AHRF with CTPA  neg of PE, though, with bronchiectasis and impacted airways, and RSV  - Troponins mildly elevated at 139 --> 133.  No real dynamic changes on Troponins, unlikely ACS but likely, demand in the setting of above  - No need to trend trops.  No anginal complaints  - EKG showed Atrial flutter , rate 86, with no ischemic changes  - No baseline TTE noted.  Would obtain     - Known Hx of Afib/flutter, though, not on AC, likely in the setting of dementia and very advanced   - Rate is controlled  - Continue home Cardizem    - No clear evidence of volume overload  - CTPA not consistent of HF  - O2 supplemental as needed  - Initiate incentive spirometer if able  - Pulm following    - Monitor electrolytes, replete to keep K>4 and Mag>2  - Will continue to follow    Luz Tracey DNP, NP-C, AGACNP-C  Cardiology   Call TEAMS Assessment/Plan:   This is a 89 y/o F with Afib/flutter (not on AC), hypothyroidism, GERD, frequent UTI's, Rt total hip/knee replacement, and dementia presented with SOB, found to have mildly elevated troponins    Elevated Troponin  - Patient has no known Hx of CAD  - Presented with AHRF with CTPA  neg of PE, though, with bronchiectasis and impacted airways, and RSV  - Troponins mildly elevated at 139 --> 133.  No real dynamic changes on Troponins, unlikely ACS but likely, demand in the setting of above  - No need to trend trops.  No anginal complaints  - EKG showed Atrial flutter , rate 86, with no ischemic changes  - No baseline TTE noted.  Would obtain     - Known Hx of Afib/flutter, though, not on AC, likely in the setting of dementia and very advanced age  - Rate is controlled  - Continue home Cardizem    - No clear evidence of volume overload  - CTPA not consistent of HF  - O2 supplemental as needed  - Initiate incentive spirometer if able  - Pulm following    - Monitor electrolytes, replete to keep K>4 and Mag>2  - Will continue to follow    Luz Tracey DNP, NP-C, AGACNP-C  Cardiology   Call TEAMS

## 2024-02-09 NOTE — CONSULT NOTE ADULT - ASSESSMENT
89 year old woman with hx of dementia, afib not on AC, hypothyroidism, frequent UTIs, GERD, and R total hip and knee replacement and presenting with about four days of a cough and wheezing.    rsv viral infection  AF  OP  OA  Bronchiectasis  Hypothyroidism  UTI hx  GERD  Weakness - Malaise -     bronchodilators  chest PT  I alena  o2 support as needed  goal sat > 88 pct  HOB elev  asp prec  oral hygiene  skin care  Hypersal - Mucolytics and Cough Rx regimen  CT chest reviewed  isol precs for RSV and sx management  hydration - nutrition

## 2024-02-09 NOTE — PHYSICAL THERAPY INITIAL EVALUATION ADULT - WEIGHT-BEARING RESTRICTIONS: GAIT, REHAB EVAL
Date of Service: 09/05/2019    SUBJECTIVE:  A 32-year-old female, status post hysterectomy in the past for benign reasons, who I was asked to consult on by Anthony Gooden PA-C for urinary leakage. The patient says that for over a year now she has been leaking when she gets the urge to go and also sometimes with physical therapy and sneezing, but her main problem is overactive bladder and urgency and urgency incontinence. The patient does wear a pad because of leakage, does wake up wet. She has no pain or blood in the urine, she does not leak any stool. She has never had a sling or bladder surgery. She does not have a bulge from her vagina. She does not leak on her back. When patient was sexually active, she leaked during intercourse, but she is not sexually active any longer. She does not take any medications for incontinence. Her leakage does not restrict her social activity. She says she has had frequent bladder infections in the past but not now. She says she urinates about every hour during the day, wakes up once at night and sometimes leaks a few times during the day whenever she gets the urge and also with sneezing and daily activities. PHYSICAL EXAMINATION:  GENERAL:  This is a well-developed, well-nourished female. Vital signs are stable. Afebrile. The patient is oriented times three with normal mood and affect. NECK:  The neck is supple without masses or thyromegaly noted. LUNGS:  The lungs are clear to auscultation with normal respiratory effort noted. CARDIAC:  The cardiovascular examination showed a normal rate and rhythm without murmurs and the peripheral pulses are noted to be normal.    ABDOMEN:  The abdominal examination showed good bowel sounds. There are no masses or hernias noted or organomegaly. GENITALIA:  External genitalia is noted to be normal with normal urethral meatus. The urethra and the bladder are without masses or tenderness.   The vagina is without lesions and seemed to have decreased estrogenic effect. There is no discharge noted. There is a first to second-degree cystocele and a first-degree rectocele noted. The cervix and uterus are surgically absent. The adnexa are without masses, tenderness or nodularity. The rectovaginal examination confirmed the above. RECTAL:  The rectum included good sphincter tone. LYMPH:  There is no lymphadenopathy noted. SKIN:  The skin is without noticeable new lesions or ulcers. IMPRESSION:  1. Cystocele, rectocele. The patient is not symptomatic. No treatment is necessary. Seem to be stable and chronic. 2.  Overactive bladder. This is patient's main concern. She will do Kegel exercises, foods to avoid and try Myrbetriq. 3.  Stress incontinence, will try Kegel exercises. PLAN:  1.  UA.  2.  Ultrasound performed which showed a postvoid residual of 2 mL. 3.  The patient instructed on proper Kegel exercises and foods to avoid. 4.  Myrbetriq 50 mg a day. 5.  Patient to return in 6 weeks for reevaluation of symptoms. A letter of consultation sent to Nikolai Jara PA-C. Dictated By: Kia Lee MD  Signing Provider: MD LULA Valle/nataly ([id number])  DD: 09/05/2019 15:49:59 TD: 09/06/2019 09:47:26    Copy Sent To: full weight-bearing

## 2024-02-09 NOTE — CARE COORDINATION ASSESSMENT. - OTHER PERTINENT DISCHARGE PLANNING INFORMATION:
Met patient at bedside.  Explained role of CM, verbalized understanding. Pt was made aware a CM will remain available through hospitalization.  Contact information given in discharge/ transitions resource folder. Pt alert to person only. CM called pt son Clarke Lazar [phone]. Per pt son lives alone and has 12 hour / 7 day private hire aide who gives pt assistance w ADL, ambulation, and med management prior to admission. Pt family drives to appointments, pt lives at [facility] and has elevators on premisies, pt currently receiving home PT from [facility], pt has walker. CM verified: PCP: Dr. Goldberg Pharmacy: [facility]. : no. Pt son stated he will  when medically safe for discharge.

## 2024-02-09 NOTE — CASE MANAGEMENT PROGRESS NOTE - NSCMPROGRESSNOTE_GEN_ALL_CORE
Patient is identified as a CMS STAR patient. Transition care management program explained to pt /  son Clarke and yellow contact card has been provided at bedside.

## 2024-02-09 NOTE — CONSULT NOTE ADULT - SUBJECTIVE AND OBJECTIVE BOX
[facility] - [place] Pannella, Patel, Savella, Cohen [facility]  Office Number: [phone]    Initial Consult Note:  This is a 89 y/o F with Afib (not on AC), hypothyroidism, GERD, frequent UTI's, Rt total hip/knee replacement, and dementia presented with AHRF, found to have mildly elevated troponins    CHIEF COMPLAINT: Patient is a 89y old  Female who presents with a chief complaint of acute hypoxic respiratory failure 2/2 RSV (09 Feb 2024 05:45)    HPI:  89 year old woman with hx of dementia, afib not on AC, hypothyroidism, frequent UTIs, GERD, and R total hip and knee replacement and presenting with about four days of a cough and wheezing. Pt is accompanied by daughter who is providing some of the history. Pt lives at the [facility] in [place], [place]. Per daughter, pt has been coughing, with some wheezing since this past weekend. Pt visited her PCP, who started her on doxycycline however it was not well tolerated so pt discontinued it. Pt was not improving so daughter brought pt to ED. Pt has not taken any other medications for symptom relieve. Of note there are a few COVID + cases at the pt's residence. Denies SOB at rest, dizziness, subjective fevers, chest pain, back pain, or pain or burning on urination,     ED course  Vitals: T 97.4F, HR 97, /61, RR 18, SpO2 92% on RA  Significant labs: Cl 109, glu 144, trop 139, proBNP 454 UA with trace leuk est, trace blood, few bacteria, RSV positive  Imaging:   - CXR small hiatal hernia, chronic thoracolumbar scoliosis and degenerative changes of the spine as well as chornic arthropathy of both shoulders  - CT Chest: Bilateral mild bronchiectasis as on June 29, 2019 with superimposed mild impacted distal and dilated airways may be due to internal secretions.  - CT Angio: no PE  EKG: A flutter with variable AV block, HR 86, QTc 461   In ED patient given: ceftriaxone 1000mg x1, azithromycin 500mg x1, solumedrol 125mg IVP, 500mL NS bolus x1, Duoneb x2    (08 Feb 2024 21:25)    PAST MEDICAL & SURGICAL HISTORY:  Atrial fibrillation      GERD (gastroesophageal reflux disease)      Hypothyroidism, adult      Degenerative disc disease, cervical      Degenerative disc disease, lumbar      OA (osteoarthritis)      Sarcoma of omentum      S/P tonsillectomy      S/P oophorectomy      After cataract not obscuring vision, bilateral  b/l lens implants      Abdominal wall mass  Exp La, Excison of abdominal mass - 8/2015      History of total hip replacement, right      H/O total knee replacement, right        SOCIAL HISTORY:  No tobacco, ethanol, or drug abuse.  FAMILY HISTORY:  Family history of emphysema    Family history of early CAD    Family history of breast cancer      No family history of acute MI or sudden cardiac death.  MEDICATIONS  (STANDING):  albuterol/ipratropium for Nebulization 3 milliLiter(s) Nebulizer every 6 hours  azithromycin  IVPB 500 milliGRAM(s) IV Intermittent every 24 hours  cefTRIAXone   IVPB 1000 milliGRAM(s) IV Intermittent every 24 hours  diltiazem    milliGRAM(s) Oral daily  ferrous    sulfate 325 milliGRAM(s) Oral daily  folic acid 1 milliGRAM(s) Oral daily  furosemide    Tablet 20 milliGRAM(s) Oral daily  levothyroxine 75 MICROGram(s) Oral daily  pantoprazole    Tablet 40 milliGRAM(s) Oral before breakfast  potassium chloride    Tablet ER 10 milliEquivalent(s) Oral daily  QUEtiapine 50 milliGRAM(s) Oral at bedtime  QUEtiapine 12.5 milliGRAM(s) Oral <User Schedule>  sertraline 150 milliGRAM(s) Oral daily    MEDICATIONS  (PRN):  acetaminophen     Tablet .. 650 milliGRAM(s) Oral every 6 hours PRN Temp greater or equal to 38C (100.4F), Mild Pain (1 - 3)  ondansetron Injectable 4 milliGRAM(s) IV Push every 6 hours PRN Nausea    Allergies      Intolerances      REVIEW OF SYSTEMS:  CONSTITUTIONAL: No weakness, fevers or chills  EYES/ENT: No visual changes;  No vertigo or throat pain   NECK: No pain or stiffness  RESPIRATORY: No cough, wheezing, hemoptysis; No shortness of breath  CARDIOVASCULAR: No chest pain or palpitations  GASTROINTESTINAL: No abdominal pain. No nausea, vomiting, or hematemesis; No diarrhea or constipation. No melena or hematochezia.  GENITOURINARY: No dysuria, frequency or hematuria  NEUROLOGICAL: No numbness or weakness  SKIN: No itching or rash  All other review of systems is negative unless indicated above  VITAL SIGNS:   Vital Signs Last 24 Hrs  T(C): 36.4 (09 Feb 2024 05:15), Max: 36.6 (09 Feb 2024 00:30)  T(F): 97.5 (09 Feb 2024 05:15), Max: 97.8 (09 Feb 2024 00:30)  HR: 66 (09 Feb 2024 05:15) (66 - 99)  BP: 160/80 (09 Feb 2024 05:15) (113/61 - 160/80)  BP(mean): --  RR: 19 (09 Feb 2024 05:15) (18 - 24)  SpO2: 93% (09 Feb 2024 05:15) (88% - 96%)    Parameters below as of 09 Feb 2024 05:15  Patient On (Oxygen Delivery Method): nasal cannula  O2 Flow (L/min): 3    I&O's Summary    On Exam:  Constitutional: NAD, alert and oriented x 3  Lungs:  Non-labored, breath sounds are clear bilaterally, No wheezing, rales or rhonchi  Cardiovascular: RRR.  S1 and S2 positive.  No murmurs, rubs, gallops or clicks  Gastrointestinal: Bowel Sounds present, soft, nontender.   Lymph: No peripheral edema. No cervical lymphadenopathy.  Neurological: Alert, no focal deficits  Skin: No rashes or ulcers   Psych:  Mood & affect appropriate.    LABS: All Labs Reviewed:                        14.2   4.72  )-----------( 165      ( 08 Feb 2024 14:55 )             43.0     08 Feb 2024 18:50    140    |  109    |  20     ----------------------------<  144    3.5     |  23     |  0.95     Ca    8.0        08 Feb 2024 18:50  Mg     2.2       08 Feb 2024 18:50    TPro  8.0    /  Alb  3.4    /  TBili  0.5    /  DBili  x      /  AST  16     /  ALT  16     /  AlkPhos  77     08 Feb 2024 18:50    PT/INR - ( 08 Feb 2024 18:50 )   PT: 11.8 sec;   INR: 1.01 ratio    PTT - ( 08 Feb 2024 18:50 )  PTT:28.6 sec    02-08 @ 18:50  TSH: 1.59    RADIOLOGY:    ACC: [id number] EXAM:  CT ANGIO CHEST PULM ART [facility]   ORDERED BY:  VANDANA MIN     PROCEDURE DATE:  02/08/2024      INTERPRETATION:  CLINICAL INFORMATION: 89-year-old female with cough and   hypoxia    COMPARISON: CT scan 06/29/2019 and CT earlier 02/08/2024    CONTRAST/COMPLICATIONS:  IV Contrast: Omnipaque 350  70 cc administered   30 cc discarded  Oral Contrast: NONE  Complications: None reported at time of study completion    PROCEDURE:  CT Angiography of the Chest.  Sagittal and coronal reformats were performed as well as 3D (MIP)   reconstructions.    FINDINGS:    LUNGS AND AIRWAYS: Patent central airways. Impacted small airways and   mild bronchiectasis as previously described. Distal lower lobe impacted   subsegmental limits airways.  PLEURA: No pleural effusion.  MEDIASTINUM AND WILLARD: No lymphadenopathy. Distended upper esophagus with   fluid debris level.  VESSELS: No  pulmonary embolus  HEART: Heart size is normal. No pericardial effusion.  CHEST WALL AND LOWER NECK: Within normal limits.  VISUALIZED UPPER ABDOMEN: Moderate hiatus hernia Within normal limits.  BONES: Degenerative changes.    IMPRESSION:  No pulmonary embolus.  Mild bronchiectasis and impacted distal airways as previously described  --- End of Report ---    PAVAN CHA MD; Attending Radiologist  This document has been electronically signed. Feb 8 2024  8:12PM    EKG:    Assessment/Plan:   This is a 89 y/o F with Afib/flutter (not on AC), hypothyroidism, GERD, frequent UTI's, Rt total hip/knee replacement, and dementia presented with AHRF, found to have mildly elevated troponins    Elevated Troponin  - Patient has no known Hx of CAD  - Presented with AHRF with CTPA  neg of PE, though, with bronchiectasis and impacted airways, and RSV  - Troponins mildly elevated at 139 --> 133.  No real dynamic changes on Troponins, unlikely ACS but likely, demand in the setting of above  - No need to trend trops  - EKG showed Atrial flutter , rate 86, with no ischemic changes  - No baseline TTE noted.  Would obtain     - Known Hx of Afib/flutter, though, not on AC, likely in the setting of dementia and very advanced   - Rate is controlled  - Continue home Cardizem    - No clear evidence of volume overload  - CTPA not consistent of HF  - O2 supplemental as needed      Luz Tracey DNP, NP-C, AGACNP-C  Cardiology  Call TEAMS       [facility] - [place] Pannella, Patel, Savella, Cohen [facility]  Office Number: [phone]    Initial Consult Note:  This is a 89 y/o F with Afib (not on AC), hypothyroidism, GERD, frequent UTI's, Rt total hip/knee replacement, and dementia presented with AHRF, found to have mildly elevated troponins    CHIEF COMPLAINT: Patient is a 89y old  Female who presents with a chief complaint of acute hypoxic respiratory failure 2/2 RSV (09 Feb 2024 05:45)    HPI:  89 year old woman with hx of dementia, afib not on AC, hypothyroidism, frequent UTIs, GERD, and R total hip and knee replacement and presenting with about four days of a cough and wheezing. Pt is accompanied by daughter who is providing some of the history. Pt lives at the [facility] in [place], [place]. Per daughter, pt has been coughing, with some wheezing since this past weekend. Pt visited her PCP, who started her on doxycycline however it was not well tolerated so pt discontinued it. Pt was not improving so daughter brought pt to ED. Pt has not taken any other medications for symptom relieve. Of note there are a few COVID + cases at the pt's residence. Denies SOB at rest, dizziness, subjective fevers, chest pain, back pain, or pain or burning on urination,     ED course  Vitals: T 97.4F, HR 97, /61, RR 18, SpO2 92% on RA  Significant labs: Cl 109, glu 144, trop 139, proBNP 454 UA with trace leuk est, trace blood, few bacteria, RSV positive  Imaging:   - CXR small hiatal hernia, chronic thoracolumbar scoliosis and degenerative changes of the spine as well as chornic arthropathy of both shoulders  - CT Chest: Bilateral mild bronchiectasis as on June 29, 2019 with superimposed mild impacted distal and dilated airways may be due to internal secretions.  - CT Angio: no PE  EKG: A flutter with variable AV block, HR 86, QTc 461   In ED patient given: ceftriaxone 1000mg x1, azithromycin 500mg x1, solumedrol 125mg IVP, 500mL NS bolus x1, Duoneb x2    (08 Feb 2024 21:25)    PAST MEDICAL & SURGICAL HISTORY:  Atrial fibrillation      GERD (gastroesophageal reflux disease)      Hypothyroidism, adult      Degenerative disc disease, cervical      Degenerative disc disease, lumbar      OA (osteoarthritis)      Sarcoma of omentum      S/P tonsillectomy      S/P oophorectomy      After cataract not obscuring vision, bilateral  b/l lens implants      Abdominal wall mass  Exp La, Excison of abdominal mass - 8/2015      History of total hip replacement, right      H/O total knee replacement, right        SOCIAL HISTORY:  No tobacco, ethanol, or drug abuse.  FAMILY HISTORY:  Family history of emphysema    Family history of early CAD    Family history of breast cancer      No family history of acute MI or sudden cardiac death.  MEDICATIONS  (STANDING):  albuterol/ipratropium for Nebulization 3 milliLiter(s) Nebulizer every 6 hours  azithromycin  IVPB 500 milliGRAM(s) IV Intermittent every 24 hours  cefTRIAXone   IVPB 1000 milliGRAM(s) IV Intermittent every 24 hours  diltiazem    milliGRAM(s) Oral daily  ferrous    sulfate 325 milliGRAM(s) Oral daily  folic acid 1 milliGRAM(s) Oral daily  furosemide    Tablet 20 milliGRAM(s) Oral daily  levothyroxine 75 MICROGram(s) Oral daily  pantoprazole    Tablet 40 milliGRAM(s) Oral before breakfast  potassium chloride    Tablet ER 10 milliEquivalent(s) Oral daily  QUEtiapine 50 milliGRAM(s) Oral at bedtime  QUEtiapine 12.5 milliGRAM(s) Oral <User Schedule>  sertraline 150 milliGRAM(s) Oral daily    MEDICATIONS  (PRN):  acetaminophen     Tablet .. 650 milliGRAM(s) Oral every 6 hours PRN Temp greater or equal to 38C (100.4F), Mild Pain (1 - 3)  ondansetron Injectable 4 milliGRAM(s) IV Push every 6 hours PRN Nausea    Allergies      Intolerances      REVIEW OF SYSTEMS:  CONSTITUTIONAL: No weakness, fevers or chills  EYES/ENT: No visual changes;  No vertigo or throat pain   NECK: No pain or stiffness  RESPIRATORY: No cough, wheezing, hemoptysis; No shortness of breath  CARDIOVASCULAR: No chest pain or palpitations  GASTROINTESTINAL: No abdominal pain. No nausea, vomiting, or hematemesis; No diarrhea or constipation. No melena or hematochezia.  GENITOURINARY: No dysuria, frequency or hematuria  NEUROLOGICAL: No numbness or weakness  SKIN: No itching or rash  All other review of systems is negative unless indicated above  VITAL SIGNS:   Vital Signs Last 24 Hrs  T(C): 36.4 (09 Feb 2024 05:15), Max: 36.6 (09 Feb 2024 00:30)  T(F): 97.5 (09 Feb 2024 05:15), Max: 97.8 (09 Feb 2024 00:30)  HR: 66 (09 Feb 2024 05:15) (66 - 99)  BP: 160/80 (09 Feb 2024 05:15) (113/61 - 160/80)  BP(mean): --  RR: 19 (09 Feb 2024 05:15) (18 - 24)  SpO2: 93% (09 Feb 2024 05:15) (88% - 96%)    Parameters below as of 09 Feb 2024 05:15  Patient On (Oxygen Delivery Method): nasal cannula  O2 Flow (L/min): 3    I&O's Summary    On Exam:  Constitutional: NAD, alert and oriented x 3  Lungs:  Non-labored, breath sounds are clear bilaterally, No wheezing, rales or rhonchi  Cardiovascular: RRR.  S1 and S2 positive.  No murmurs, rubs, gallops or clicks  Gastrointestinal: Bowel Sounds present, soft, nontender.   Lymph: No peripheral edema. No cervical lymphadenopathy.  Neurological: Alert, no focal deficits  Skin: No rashes or ulcers   Psych:  Mood & affect appropriate.    LABS: All Labs Reviewed:                        14.2   4.72  )-----------( 165      ( 08 Feb 2024 14:55 )             43.0     08 Feb 2024 18:50    140    |  109    |  20     ----------------------------<  144    3.5     |  23     |  0.95     Ca    8.0        08 Feb 2024 18:50  Mg     2.2       08 Feb 2024 18:50    TPro  8.0    /  Alb  3.4    /  TBili  0.5    /  DBili  x      /  AST  16     /  ALT  16     /  AlkPhos  77     08 Feb 2024 18:50    PT/INR - ( 08 Feb 2024 18:50 )   PT: 11.8 sec;   INR: 1.01 ratio    PTT - ( 08 Feb 2024 18:50 )  PTT:28.6 sec    02-08 @ 18:50  TSH: 1.59    RADIOLOGY:    ACC: [id number] EXAM:  CT ANGIO CHEST PULM ART [facility]   ORDERED BY:  VANDANA MIN     PROCEDURE DATE:  02/08/2024      INTERPRETATION:  CLINICAL INFORMATION: 89-year-old female with cough and   hypoxia    COMPARISON: CT scan 06/29/2019 and CT earlier 02/08/2024    CONTRAST/COMPLICATIONS:  IV Contrast: Omnipaque 350  70 cc administered   30 cc discarded  Oral Contrast: NONE  Complications: None reported at time of study completion    PROCEDURE:  CT Angiography of the Chest.  Sagittal and coronal reformats were performed as well as 3D (MIP)   reconstructions.    FINDINGS:    LUNGS AND AIRWAYS: Patent central airways. Impacted small airways and   mild bronchiectasis as previously described. Distal lower lobe impacted   subsegmental limits airways.  PLEURA: No pleural effusion.  MEDIASTINUM AND WILLARD: No lymphadenopathy. Distended upper esophagus with   fluid debris level.  VESSELS: No  pulmonary embolus  HEART: Heart size is normal. No pericardial effusion.  CHEST WALL AND LOWER NECK: Within normal limits.  VISUALIZED UPPER ABDOMEN: Moderate hiatus hernia Within normal limits.  BONES: Degenerative changes.    IMPRESSION:  No pulmonary embolus.  Mild bronchiectasis and impacted distal airways as previously described  --- End of Report ---    PAVAN CHA MD; Attending Radiologist  This document has been electronically signed. Feb 8 2024  8:12PM    EKG: Aflutter, rate 86, non-ischemic

## 2024-02-09 NOTE — CARE COORDINATION ASSESSMENT. - NSPASTMEDSURGHISTORY_GEN_ALL_CORE_FT
PAST MEDICAL & SURGICAL HISTORY:  OA (osteoarthritis)      Degenerative disc disease, lumbar      Degenerative disc disease, cervical      Hypothyroidism, adult      GERD (gastroesophageal reflux disease)      Atrial fibrillation      S/P oophorectomy      S/P tonsillectomy      Abdominal wall mass  Exp La, Excison of abdominal mass - 8/2015      After cataract not obscuring vision, bilateral  b/l lens implants      Sarcoma of omentum      H/O total knee replacement, right      History of total hip replacement, right

## 2024-02-09 NOTE — CARE COORDINATION ASSESSMENT. - NSCAREPROVIDERS_GEN_ALL_CORE_FT
CARE PROVIDERS:  Accepting Physician: Kailey Cantrell  Administration: Evie Fernandez  Admitting: Kailey Cantrell  Attending: Kailey Cantrell  Case Management: Wilmer Ramsay  Case Management: Sridevi Sarabia  Consultant: Halie Driscoll  Consultant: Luz Tracey  Consultant: Wilder Reece  Consultant: Ari Gupta  Covering Team: Miquel Florentino  Covering Team: Mckenzie Rocha  Covering Team: Ross Miller  ED ACP: Nery Chavez  ED Attending: Tim Peña ED Nurse: Natalia Silva  Emergency Medicine: Nery Chavez  Infection Control: Valentine Hughes  Nurse: Lis Davis  Nurse: William Howard  Nurse: Natalia Silva  Ordered: ADM, User  Ordered: Doctor, Unknown  Outpatient Provider: Kailey Cantrell  Outpatient Provider: Mark Chen  Override: William Howard  PCA/Nursing Assistant: Mary Shirley  PCA/Nursing Assistant: Areli Atkinson  Primary Team: Angeline Layne  Primary Team: Hilaria Amato  Registered Dietitian: Itzel Mane  Respiratory Therapy: John Sandoval  Respiratory Therapy: Shannon Deleon  Respiratory Therapy: Meli Ybarra  Respiratory Therapy: Vicki Manning  Respiratory Therapy: Florian Anderson  : Jina Mckenna

## 2024-02-09 NOTE — PATIENT CHOICE NOTE. - NSPTCHOICESTATE_GEN_ALL_CORE

## 2024-02-09 NOTE — PROGRESS NOTE ADULT - ASSESSMENT
89 year old woman with hx of dementia, afib not on AC, hypothyroidism, frequent UTIs, GERD, and R total hip and knee replacement and presenting with about four days of a cough and wheezing. admitted for  sepsis sec to RSV+,  acute hypoxic respiratory failure and r/o acs    # Acute hypoxic respiratory failure.   ·  RSV+  sepsis met on admission, tachycardia  and tachypna  - Pt likely with reactive airway 2/2 acute viral illness, possible concomitant bacterial infection   - continue with azithromycin and ceftriaxone  - continue duonebs   - will hold off on steroids as pt is mildly hypoxic with no SOB at rest   - fall, aspiration precautions  - trop 139, proBNP 454, likely demand ischemia in setting of acute illness  - Blood cultures sent x2, f/u results   - F/u strep urine Ag and legionella   - F/u ABG  - Pulmonology consulted (Dr. Reece), f/u recs.    #Frequent UTI.   - hold home methenamine, pt receiving IV abx   - F/u urine cx.    #Dementia.   ·  Plan: Chronic, pt follows with Dr. Derian Whiting (neuro varghese)   - c/w Seroquel 12.5mg in AM and 50mg in PM for delirium/agitation.    #chr Afib  - EKG: A flutter with variable AV block, HR 86, QTc 461  - c/w cardizem 300mg daily  - c/w Lasix 20mg daily for leg swelling   - currently rate controlled  - pt not on AC   - continue to monitor on telemetry  - F/u TTE.    #hypothyroidism  - c/w levothyroxine 75 mcg daily.    #: Anxiety and depression.     - c/w sertraline 150mg daily.    # GERD (gastroesophageal reflux disease).   - c/w pantoprazole 40mg daily    #: DVT ppx:  - Lovenox    #GOC:  - Pt is full code.    [facility]   [phone]

## 2024-02-09 NOTE — CONSULT NOTE ADULT - SUBJECTIVE AND OBJECTIVE BOX
Date/Time Patient Seen:  		  Referring MD:   Data Reviewed	       Patient is a 89y old  Female who presents with a chief complaint of acute hypoxic respiratory failure 2/2 RSV (08 Feb 2024 21:25)      Subjective/HPI   89 year old woman with hx of dementia, afib not on AC, hypothyroidism, frequent UTIs, GERD, and R total hip and knee replacement and presenting with about four days of a cough and wheezing. Pt is accompanied by daughter who is providing some of the history. Pt lives at the [facility] in [place], [place]. Per daughter, pt has been coughing, with some wheezing since this past weekend. Pt visited her PCP, who started her on doxycycline however it was not well tolerated so pt discontinued it. Pt was not improving so daughter brought pt to ED. Pt has not taken any other medications for symptom relieve. Of note there are a few COVID + cases at the pt's residence. Denies SOB at rest, dizziness, subjective fevers, chest pain, back pain, or pain or burning on urination,     PAST MEDICAL & SURGICAL HISTORY:  Atrial fibrillation    GERD (gastroesophageal reflux disease)    Hypothyroidism, adult    HTN (hypertension)    Degenerative disc disease, cervical    Degenerative disc disease, lumbar    OA (osteoarthritis)    Sarcoma of omentum    Afib    GERD (gastroesophageal reflux disease)    Hypothyroidism    Sarcoma of omentum    S/P tonsillectomy    S/P oophorectomy    Abdominal mass  Sarcoma - 10/2015  Exp lap, Excision of abdominal mass - 10/2015    After cataract not obscuring vision, bilateral  b/l lens implants    Abdominal wall mass  Exp La, Excison of abdominal mass - 8/2015    History of total hip replacement, right    H/O total knee replacement, right    PAST SURGICAL HISTORY:  Abdominal wall mass Exp La, Excison of abdominal mass - 8/2015    After cataract not obscuring vision, bilateral b/l lens implants    H/O total knee replacement, right     History of total hip replacement, right     S/P oophorectomy     S/P tonsillectomy.     FAMILY HISTORY:  Family history of breast cancer  Family history of early CAD  Family history of emphysema.     Social History:  · Substance use	No  · Social History (marital status, living situation, occupation, and sexual history)	Tobacco: denies  EtOH: denies  Recreational drug use: denies  Lives at assisted living facility  Ambulates: with assistance and with a walker  ADLs: has HHA 12 hrs per day     Tobacco Screening:  · Core Measure Site	Yes  · Has the patient used tobacco in the past 30 days?	No    Risk Assessment:    Present on Admission:  Deep Venous Thrombosis	no  Pulmonary Embolus	no     HIV Screening:  · In accordance with [place] State law, we offer every patient who comes to our ED an HIV test. Would you like to be tested today?	Previously Declined (within the last year)        Medication list         MEDICATIONS  (STANDING):  albuterol/ipratropium for Nebulization 3 milliLiter(s) Nebulizer every 6 hours  azithromycin  IVPB 500 milliGRAM(s) IV Intermittent every 24 hours  cefTRIAXone   IVPB 1000 milliGRAM(s) IV Intermittent every 24 hours  diltiazem    milliGRAM(s) Oral daily  ferrous    sulfate 325 milliGRAM(s) Oral daily  folic acid 1 milliGRAM(s) Oral daily  furosemide    Tablet 20 milliGRAM(s) Oral daily  levothyroxine 75 MICROGram(s) Oral daily  pantoprazole    Tablet 40 milliGRAM(s) Oral before breakfast  potassium chloride    Tablet ER 10 milliEquivalent(s) Oral daily  QUEtiapine 12.5 milliGRAM(s) Oral <User Schedule>  QUEtiapine 50 milliGRAM(s) Oral at bedtime  sertraline 150 milliGRAM(s) Oral daily    MEDICATIONS  (PRN):  acetaminophen     Tablet .. 650 milliGRAM(s) Oral every 6 hours PRN Temp greater or equal to 38C (100.4F), Mild Pain (1 - 3)  ondansetron Injectable 4 milliGRAM(s) IV Push every 6 hours PRN Nausea         Vitals log        ICU Vital Signs Last 24 Hrs  T(C): 36.4 (09 Feb 2024 05:15), Max: 36.6 (09 Feb 2024 00:30)  T(F): 97.5 (09 Feb 2024 05:15), Max: 97.8 (09 Feb 2024 00:30)  HR: 66 (09 Feb 2024 05:15) (66 - 99)  BP: 160/80 (09 Feb 2024 05:15) (113/61 - 160/80)  BP(mean): --  ABP: --  ABP(mean): --  RR: 19 (09 Feb 2024 05:15) (18 - 24)  SpO2: 93% (09 Feb 2024 05:15) (88% - 96%)    O2 Parameters below as of 09 Feb 2024 05:15  Patient On (Oxygen Delivery Method): nasal cannula  O2 Flow (L/min): 3               Input and Output:  I&O's Detail      Lab Data                        14.2   4.72  )-----------( 165      ( 08 Feb 2024 14:55 )             43.0     02-08    140  |  109<H>  |  20  ----------------------------<  144<H>  3.5   |  23  |  0.95    Ca    8.0<L>      08 Feb 2024 18:50  Mg     2.2     02-08    TPro  8.0  /  Alb  3.4  /  TBili  0.5  /  DBili  x   /  AST  16  /  ALT  16  /  AlkPhos  77  02-08            Review of Systems	    weakness  frailty  on o2 support    Objective     Physical Examination    heart s1s2  lung dc BS  head nc      Pertinent Lab findings & Imaging      Adams:  NO   Adequate UO     I&O's Detail           Discussed with:     Cultures:	        Radiology    ACC: [id number] EXAM:  CT ANGIO CHEST PULM ART [facility]   ORDERED BY:  VANDANA MIN     PROCEDURE DATE:  02/08/2024          INTERPRETATION:  CLINICAL INFORMATION: 89-year-old female with cough and   hypoxia    COMPARISON: CT scan 06/29/2019 and CT earlier 02/08/2024    CONTRAST/COMPLICATIONS:  IV Contrast: Omnipaque 350  70 cc administered   30 cc discarded  Oral Contrast: NONE  Complications: None reported at time of study completion    PROCEDURE:  CT Angiography of the Chest.  Sagittal and coronal reformats were performed as well as 3D (MIP)   reconstructions.    FINDINGS:    LUNGS AND AIRWAYS: Patent central airways. Impacted small airways and   mild bronchiectasis as previously described. Distal lower lobe impacted   subsegmental limits airways.  PLEURA: No pleural effusion.  MEDIASTINUM AND WILLARD: No lymphadenopathy. Distended upper esophagus with   fluid debris level.  VESSELS: No  pulmonary embolus  HEART: Heart size is normal. No pericardial effusion.  CHEST WALL AND LOWER NECK: Within normal limits.  VISUALIZED UPPER ABDOMEN: Moderate hiatus hernia Within normal limits.  BONES: Degenerative changes.    IMPRESSION:  No pulmonary embolus.  Mild bronchiectasis and impacted distal airways as previously described        --- End of Report ---            PAVAN CHA MD; Attending Radiologist  This document has been electronically signed. Feb 8 2024  8:12PM

## 2024-02-09 NOTE — PHYSICAL THERAPY INITIAL EVALUATION ADULT - PERTINENT HX OF CURRENT PROBLEM, REHAB EVAL
89 year old woman with hx of dementia, afib not on AC, hypothyroidism, frequent UTIs, GERD, and R total hip and knee replacement and presenting with about four days of a cough and wheezing. Pt is accompanied by daughter who is providing some of the history. Pt lives at the [facility] in [place], [place]. Per daughter, pt has been coughing, with some wheezing since this past weekend. Pt visited her PCP, who started her on doxycycline however it was not well tolerated so pt discontinued it. Pt was not improving so daughter brought pt to ED. Pt has not taken any other medications for symptom relieve. Of note there are a few COVID + cases at the pt's residence, RSV positive

## 2024-02-09 NOTE — CONSULT NOTE ADULT - NS ATTEND AMEND GEN_ALL_CORE FT
This is a 89 y/o F with Afib/flutter (not on AC), hypothyroidism, GERD, frequent UTI's, Rt total hip/knee replacement, and dementia presented with SOB, found to have mildly elevated troponins      Trops flat 139-->133. Likely demand in the setting of likely underlying CAD. No need to trend further.   Initial EKG with rate controlled Aflutter, now in NSR this morning. No ST changes noted. No anginal complaints.   Found to be RSV+  Continue home Cardizem  Obtain TTE  Elevated CHADSVASc but not on AC, likely due to increased bleeding risk given her advanced age.   To be followed by Dr. Ari Gupta in AM.

## 2024-02-09 NOTE — CARE COORDINATION ASSESSMENT. - PRO ARRIVE FROM
Pt alert to person only. CM called pt son Clarke Lazar [phone]. Per pt son lives alone and has 12 hour / 7 day private hire aide who gives pt assistance w ADL, ambulation, and med management prior to admission. Pt family drives to appointments, pt lives at [facility] and has elevators on premisies, pt currently receiving home PT from [facility], pt has walker. CM verified: PCP: Dr. Goldberg Pharmacy: [facility]. [place]: no. Pt son stated he will  when medically safe for discharge./home

## 2024-02-10 LAB
ANION GAP SERPL CALC-SCNC: 5 MMOL/L — SIGNIFICANT CHANGE UP (ref 5–17)
BUN SERPL-MCNC: 24 MG/DL — HIGH (ref 7–23)
CALCIUM SERPL-MCNC: 8.5 MG/DL — SIGNIFICANT CHANGE UP (ref 8.5–10.1)
CHLORIDE SERPL-SCNC: 112 MMOL/L — HIGH (ref 96–108)
CO2 SERPL-SCNC: 28 MMOL/L — SIGNIFICANT CHANGE UP (ref 22–31)
CREAT SERPL-MCNC: 1 MG/DL — SIGNIFICANT CHANGE UP (ref 0.5–1.3)
EGFR: 52 ML/MIN/1.73M2 — LOW
GLUCOSE SERPL-MCNC: 100 MG/DL — HIGH (ref 70–99)
HCT VFR BLD CALC: 41.3 % — SIGNIFICANT CHANGE UP (ref 34.5–45)
HGB BLD-MCNC: 13.8 G/DL — SIGNIFICANT CHANGE UP (ref 11.5–15.5)
MCHC RBC-ENTMCNC: 31.8 PG — SIGNIFICANT CHANGE UP (ref 27–34)
MCHC RBC-ENTMCNC: 33.4 GM/DL — SIGNIFICANT CHANGE UP (ref 32–36)
MCV RBC AUTO: 95.2 FL — SIGNIFICANT CHANGE UP (ref 80–100)
NRBC # BLD: 0 /100 WBCS — SIGNIFICANT CHANGE UP (ref 0–0)
PLATELET # BLD AUTO: 179 K/UL — SIGNIFICANT CHANGE UP (ref 150–400)
POTASSIUM SERPL-MCNC: 3.4 MMOL/L — LOW (ref 3.5–5.3)
POTASSIUM SERPL-SCNC: 3.4 MMOL/L — LOW (ref 3.5–5.3)
RBC # BLD: 4.34 M/UL — SIGNIFICANT CHANGE UP (ref 3.8–5.2)
RBC # FLD: 15.1 % — HIGH (ref 10.3–14.5)
SODIUM SERPL-SCNC: 145 MMOL/L — SIGNIFICANT CHANGE UP (ref 135–145)
WBC # BLD: 7.67 K/UL — SIGNIFICANT CHANGE UP (ref 3.8–10.5)
WBC # FLD AUTO: 7.67 K/UL — SIGNIFICANT CHANGE UP (ref 3.8–10.5)

## 2024-02-10 RX ORDER — POTASSIUM CHLORIDE 20 MEQ
40 PACKET (EA) ORAL ONCE
Refills: 0 | Status: COMPLETED | OUTPATIENT
Start: 2024-02-10 | End: 2024-02-10

## 2024-02-10 RX ADMIN — Medication 300 MILLIGRAM(S): at 05:30

## 2024-02-10 RX ADMIN — AZITHROMYCIN 255 MILLIGRAM(S): 500 TABLET, FILM COATED ORAL at 21:05

## 2024-02-10 RX ADMIN — Medication 20 MILLIGRAM(S): at 05:30

## 2024-02-10 RX ADMIN — Medication 3 MILLILITER(S): at 07:48

## 2024-02-10 RX ADMIN — QUETIAPINE FUMARATE 50 MILLIGRAM(S): 200 TABLET, FILM COATED ORAL at 21:05

## 2024-02-10 RX ADMIN — Medication 75 MICROGRAM(S): at 05:30

## 2024-02-10 RX ADMIN — Medication 3 MILLILITER(S): at 13:49

## 2024-02-10 RX ADMIN — Medication 40 MILLIEQUIVALENT(S): at 23:20

## 2024-02-10 RX ADMIN — CEFTRIAXONE 100 MILLIGRAM(S): 500 INJECTION, POWDER, FOR SOLUTION INTRAMUSCULAR; INTRAVENOUS at 17:42

## 2024-02-10 RX ADMIN — SERTRALINE 150 MILLIGRAM(S): 25 TABLET, FILM COATED ORAL at 12:19

## 2024-02-10 RX ADMIN — PANTOPRAZOLE SODIUM 40 MILLIGRAM(S): 20 TABLET, DELAYED RELEASE ORAL at 05:31

## 2024-02-10 RX ADMIN — Medication 1 MILLIGRAM(S): at 12:19

## 2024-02-10 RX ADMIN — QUETIAPINE FUMARATE 12.5 MILLIGRAM(S): 200 TABLET, FILM COATED ORAL at 09:34

## 2024-02-10 RX ADMIN — Medication 325 MILLIGRAM(S): at 12:19

## 2024-02-10 RX ADMIN — Medication 3 MILLILITER(S): at 00:43

## 2024-02-10 RX ADMIN — Medication 10 MILLIEQUIVALENT(S): at 12:19

## 2024-02-10 RX ADMIN — Medication 3 MILLILITER(S): at 19:10

## 2024-02-10 NOTE — CONSULT NOTE ADULT - ASSESSMENT
The patient is a 89 year old female with a history of hypothyroidism, GERD, paroxysmal atrial fibrillation, dementia who presents with cough and wheezing in the setting of RSV.    Plan:  - Initial ECG with coarse atrial fibrillation vs. atrial flutter with variable block  - Currently in sinus rhythm  - CTA chest negative for PE; noted is bronchiectasis and impacted distal airways  -   - Troponin mildly elevated at 139 and trended down; due to demand ischemia from underlying infection  - RSV positive  - Echo pending  - Continue diltiazem  mg daily  - Continue furosemide 20 mg daily

## 2024-02-10 NOTE — PROGRESS NOTE ADULT - ASSESSMENT
89 year old woman with hx of dementia, afib not on AC, hypothyroidism, frequent UTIs, GERD, and R total hip and knee replacement and presenting with about four days of a cough and wheezing. admitted for  sepsis sec to RSV+,  acute hypoxic respiratory failure and r/o acs    # Acute hypoxic respiratory failure.   ·  RSV+  sepsis met on admission, tachycardia  and tachypna  - Pt likely with reactive airway 2/2 acute viral illness, possible concomitant bacterial infection   - continue with azithromycin and ceftriaxone  - continue duonebs  - Pulmonology consulted (Dr. Reece), f/u recs.     #elev trops- likely demand ischemia in setting of acute illness  cards cs noted      #Frequent UTI.   - hold home methenamine, pt receiving IV abx   - F/u urine cx.    #Dementia.   ·  Plan: Chronic, pt follows with Dr. Derian Whiting (neuro varghese)   - c/w Seroquel 12.5mg in AM and 50mg in PM for delirium/agitation.    #chr Afib  - EKG: A flutter with variable AV block, HR 86, QTc 461  - c/w cardizem 300mg daily  - c/w Lasix 20mg daily for leg swelling   - currently rate controlled  - pt not on AC   - continue to monitor on telemetry  - F/u TTE.    #hypothyroidism  - c/w levothyroxine 75 mcg daily.    #: Anxiety and depression.     - c/w sertraline 150mg daily.    # GERD (gastroesophageal reflux disease).   - c/w pantoprazole 40mg daily    #: DVT ppx:  - Lovenox    #GOC:  - Pt is full code.    [facility]   [phone]

## 2024-02-10 NOTE — CONSULT NOTE ADULT - SUBJECTIVE AND OBJECTIVE BOX
History of Present Illness: The patient is a 89 year old female with a history of hypothyroidism, GERD, paroxysmal atrial fibrillation, dementia who presents with cough and wheezing. The patient is a poor historian. She denies chest pain or shortness of breath. She notes some cough and congestion. No leg swelling.    Past Medical/Surgical History:  hypothyroidism, GERD, paroxysmal atrial fibrillation, dementia    Medications:  Home Medications:  dilTIAZem 300 mg/24 hours oral capsule, extended release: 1 cap(s) orally once a day (08 Feb 2024 21:24)  ferrous sulfate 200 mg (65 mg elemental iron) oral tablet: 1 tab(s) orally once a day (08 Feb 2024 21:22)  folic acid 1 mg oral tablet: 1 tab(s) orally once a day (08 Feb 2024 21:24)  Lasix 20 mg oral tablet: 1 tab(s) orally once a day (08 Feb 2024 21:22)  levothyroxine 75 mcg (0.075 mg) oral tablet: 1 tab(s) orally once a day (08 Feb 2024 21:24)  methenamine hippurate 1 g oral tablet: 1 tab(s) orally 2 times a day (08 Feb 2024 21:24)  pantoprazole 40 mg oral delayed release tablet: 1 tab(s) orally once a day (08 Feb 2024 21:24)  potassium chloride 10 mEq oral capsule, extended release: 1 cap(s) orally once a day (08 Feb 2024 21:22)  Seroquel 25 mg oral tablet: 0.5 tab(s) orally once a day (in the morning) (08 Feb 2024 21:22)  Seroquel 50 mg oral tablet: 1 tab(s) orally once a day (at bedtime) (08 Feb 2024 21:22)      Family History: Non-contributory family history of premature cardiovascular atherosclerotic disease    Social History: No tobacco, alcohol or drug use    Review of Systems:  General: No fevers, chills, weight gain  Skin: No rashes, color changes  Cardiovascular: No chest pain, orthopnea  Respiratory: No shortness of breath, +cough  Gastrointestinal: No nausea, abdominal pain  Genitourinary: No incontinence, pain with urination  Musculoskeletal: No pain, swelling, decreased range of motion  Neurological: No headache, weakness  Psychiatric: No depression, anxiety  Endocrine: No weight gain, increased thirst  All other systems are comprehensively negative.    Physical Exam:  Vitals:        Vital Signs Last 24 Hrs  T(C): 36.4 (10 Feb 2024 04:51), Max: 36.6 (09 Feb 2024 11:49)  T(F): 97.5 (10 Feb 2024 04:51), Max: 97.8 (09 Feb 2024 11:49)  HR: 71 (10 Feb 2024 08:00) (68 - 78)  BP: 160/88 (10 Feb 2024 04:51) (120/67 - 160/88)  BP(mean): --  RR: 18 (10 Feb 2024 04:51) (18 - 18)  SpO2: 98% (10 Feb 2024 08:00) (93% - 98%)  Parameters below as of 10 Feb 2024 08:00  Patient On (Oxygen Delivery Method): nasal cannula, pt on 3lpm nc  General: NAD  HEENT: MMM  Neck: No JVD, no carotid bruit  Lungs: Rhonchi  CV: RRR, nl S1/S2, no M/R/G  Abdomen: S/NT/ND, +BS  Extremities: No LE edema, no cyanosis  Neuro: AAOx3, non-focal  Skin: No rash    Labs:                        12.8   3.32  )-----------( 154      ( 09 Feb 2024 08:36 )             38.1     02-09    139  |  108  |  22  ----------------------------<  165<H>  3.8   |  23  |  0.84    Ca    8.0<L>      09 Feb 2024 08:36  Mg     2.2     02-08    TPro  7.1  /  Alb  3.3  /  TBili  0.4  /  DBili  x   /  AST  16  /  ALT  19  /  AlkPhos  72  02-09        PT/INR - ( 08 Feb 2024 18:50 )   PT: 11.8 sec;   INR: 1.01 ratio         PTT - ( 08 Feb 2024 18:50 )  PTT:28.6 sec    ECG/Telemetry: Coarse atrial fibrillation vs. atrial flutter with variable block

## 2024-02-11 LAB
ANION GAP SERPL CALC-SCNC: 6 MMOL/L — SIGNIFICANT CHANGE UP (ref 5–17)
BUN SERPL-MCNC: 19 MG/DL — SIGNIFICANT CHANGE UP (ref 7–23)
CALCIUM SERPL-MCNC: 8.4 MG/DL — LOW (ref 8.5–10.1)
CHLORIDE SERPL-SCNC: 108 MMOL/L — SIGNIFICANT CHANGE UP (ref 96–108)
CO2 SERPL-SCNC: 29 MMOL/L — SIGNIFICANT CHANGE UP (ref 22–31)
CREAT SERPL-MCNC: 0.98 MG/DL — SIGNIFICANT CHANGE UP (ref 0.5–1.3)
EGFR: 53 ML/MIN/1.73M2 — LOW
GLUCOSE SERPL-MCNC: 91 MG/DL — SIGNIFICANT CHANGE UP (ref 70–99)
HCT VFR BLD CALC: 43.3 % — SIGNIFICANT CHANGE UP (ref 34.5–45)
HGB BLD-MCNC: 14 G/DL — SIGNIFICANT CHANGE UP (ref 11.5–15.5)
MCHC RBC-ENTMCNC: 31.1 PG — SIGNIFICANT CHANGE UP (ref 27–34)
MCHC RBC-ENTMCNC: 32.3 GM/DL — SIGNIFICANT CHANGE UP (ref 32–36)
MCV RBC AUTO: 96.2 FL — SIGNIFICANT CHANGE UP (ref 80–100)
NRBC # BLD: 0 /100 WBCS — SIGNIFICANT CHANGE UP (ref 0–0)
PLATELET # BLD AUTO: 187 K/UL — SIGNIFICANT CHANGE UP (ref 150–400)
POTASSIUM SERPL-MCNC: 3.8 MMOL/L — SIGNIFICANT CHANGE UP (ref 3.5–5.3)
POTASSIUM SERPL-SCNC: 3.8 MMOL/L — SIGNIFICANT CHANGE UP (ref 3.5–5.3)
RBC # BLD: 4.5 M/UL — SIGNIFICANT CHANGE UP (ref 3.8–5.2)
RBC # FLD: 15.3 % — HIGH (ref 10.3–14.5)
SODIUM SERPL-SCNC: 143 MMOL/L — SIGNIFICANT CHANGE UP (ref 135–145)
WBC # BLD: 9.33 K/UL — SIGNIFICANT CHANGE UP (ref 3.8–10.5)
WBC # FLD AUTO: 9.33 K/UL — SIGNIFICANT CHANGE UP (ref 3.8–10.5)

## 2024-02-11 RX ADMIN — Medication 325 MILLIGRAM(S): at 11:26

## 2024-02-11 RX ADMIN — Medication 1 MILLIGRAM(S): at 11:25

## 2024-02-11 RX ADMIN — Medication 3 MILLILITER(S): at 14:26

## 2024-02-11 RX ADMIN — Medication 10 MILLIEQUIVALENT(S): at 11:25

## 2024-02-11 RX ADMIN — Medication 75 MICROGRAM(S): at 05:33

## 2024-02-11 RX ADMIN — Medication 300 MILLIGRAM(S): at 05:33

## 2024-02-11 RX ADMIN — CEFTRIAXONE 100 MILLIGRAM(S): 500 INJECTION, POWDER, FOR SOLUTION INTRAMUSCULAR; INTRAVENOUS at 17:10

## 2024-02-11 RX ADMIN — Medication 3 MILLILITER(S): at 20:00

## 2024-02-11 RX ADMIN — Medication 3 MILLILITER(S): at 07:48

## 2024-02-11 RX ADMIN — AZITHROMYCIN 255 MILLIGRAM(S): 500 TABLET, FILM COATED ORAL at 21:11

## 2024-02-11 RX ADMIN — QUETIAPINE FUMARATE 12.5 MILLIGRAM(S): 200 TABLET, FILM COATED ORAL at 11:26

## 2024-02-11 RX ADMIN — SERTRALINE 150 MILLIGRAM(S): 25 TABLET, FILM COATED ORAL at 11:25

## 2024-02-11 RX ADMIN — PANTOPRAZOLE SODIUM 40 MILLIGRAM(S): 20 TABLET, DELAYED RELEASE ORAL at 05:33

## 2024-02-11 RX ADMIN — QUETIAPINE FUMARATE 50 MILLIGRAM(S): 200 TABLET, FILM COATED ORAL at 21:10

## 2024-02-11 RX ADMIN — Medication 20 MILLIGRAM(S): at 05:34

## 2024-02-11 NOTE — PROGRESS NOTE ADULT - ASSESSMENT
89 year old woman with hx of dementia, afib not on AC, hypothyroidism, frequent UTIs, GERD, and R total hip and knee replacement and presenting with about four days of a cough and wheezing.    rsv viral infection  AF  OP  OA  Bronchiectasis  Hypothyroidism  UTI hx  GERD  Weakness - Malaise -     cardio eval noted  vs noted  fio2 wean as tolerated  remains on ABX    bronchodilators  chest PT  I alena  o2 support as needed  goal sat > 88 pct  HOB elev  asp prec  oral hygiene  skin care  Hypersal - Mucolytics and Cough Rx regimen  CT chest reviewed  isol precs for RSV and sx management  hydration - nutrition

## 2024-02-11 NOTE — PROGRESS NOTE ADULT - ASSESSMENT
89 year old woman with hx of dementia, afib not on AC, hypothyroidism, frequent UTIs, GERD, and R total hip and knee replacement and presenting with about four days of a cough and wheezing. admitted for  sepsis sec to RSV+,  acute hypoxic respiratory failure and r/o acs    # Acute hypoxic respiratory failure.   ·  RSV+  sepsis met on admission, tachycardia  and tachypnea  - Pt likely with reactive airway 2/2 acute viral illness, possible concomitant bacterial infection   - continue with azithromycin and ceftriaxone  - continue duonebs  - Pulmonology consulted (Dr. Reece), f/u recs.     #elev trops- likely demand ischemia in setting of acute illness  cards cs noted      #Frequent UTI.   - hold home methenamine, pt receiving IV abx   - F/u urine cx.    #Dementia.   ·  Plan: Chronic, pt follows with Dr. Derian Whiting (neuro varghese)   - c/w Seroquel 12.5mg in AM and 50mg in PM for delirium/agitation.    #chr Afib  - EKG: A flutter with variable AV block, HR 86, QTc 461  - c/w cardizem 300mg daily  - c/w Lasix 20mg daily for leg swelling   - currently rate controlled  - pt not on AC   - continue to monitor on telemetry  - F/u TTE.    #hypothyroidism  - c/w levothyroxine 75 mcg daily.    #: Anxiety and depression.     - c/w sertraline 150mg daily.    # GERD (gastroesophageal reflux disease).   - c/w pantoprazole 40mg daily    #: DVT ppx:  - Lovenox    #GOC:  - Pt is full code.    [facility]   [phone]

## 2024-02-12 ENCOUNTER — RESULT REVIEW (OUTPATIENT)
Age: 89
End: 2024-02-12

## 2024-02-12 LAB
ANION GAP SERPL CALC-SCNC: 5 MMOL/L — SIGNIFICANT CHANGE UP (ref 5–17)
BUN SERPL-MCNC: 14 MG/DL — SIGNIFICANT CHANGE UP (ref 7–23)
CALCIUM SERPL-MCNC: 8.1 MG/DL — LOW (ref 8.5–10.1)
CHLORIDE SERPL-SCNC: 108 MMOL/L — SIGNIFICANT CHANGE UP (ref 96–108)
CO2 SERPL-SCNC: 29 MMOL/L — SIGNIFICANT CHANGE UP (ref 22–31)
CREAT SERPL-MCNC: 0.81 MG/DL — SIGNIFICANT CHANGE UP (ref 0.5–1.3)
EGFR: 66 ML/MIN/1.73M2 — SIGNIFICANT CHANGE UP
GLUCOSE SERPL-MCNC: 99 MG/DL — SIGNIFICANT CHANGE UP (ref 70–99)
HCT VFR BLD CALC: 42.2 % — SIGNIFICANT CHANGE UP (ref 34.5–45)
HGB BLD-MCNC: 13.8 G/DL — SIGNIFICANT CHANGE UP (ref 11.5–15.5)
MCHC RBC-ENTMCNC: 31.1 PG — SIGNIFICANT CHANGE UP (ref 27–34)
MCHC RBC-ENTMCNC: 32.7 GM/DL — SIGNIFICANT CHANGE UP (ref 32–36)
MCV RBC AUTO: 95 FL — SIGNIFICANT CHANGE UP (ref 80–100)
NRBC # BLD: 0 /100 WBCS — SIGNIFICANT CHANGE UP (ref 0–0)
PLATELET # BLD AUTO: 192 K/UL — SIGNIFICANT CHANGE UP (ref 150–400)
POTASSIUM SERPL-MCNC: 3.5 MMOL/L — SIGNIFICANT CHANGE UP (ref 3.5–5.3)
POTASSIUM SERPL-SCNC: 3.5 MMOL/L — SIGNIFICANT CHANGE UP (ref 3.5–5.3)
RBC # BLD: 4.44 M/UL — SIGNIFICANT CHANGE UP (ref 3.8–5.2)
RBC # FLD: 15 % — HIGH (ref 10.3–14.5)
SODIUM SERPL-SCNC: 142 MMOL/L — SIGNIFICANT CHANGE UP (ref 135–145)
WBC # BLD: 8.94 K/UL — SIGNIFICANT CHANGE UP (ref 3.8–10.5)
WBC # FLD AUTO: 8.94 K/UL — SIGNIFICANT CHANGE UP (ref 3.8–10.5)

## 2024-02-12 RX ADMIN — Medication 3 MILLILITER(S): at 20:26

## 2024-02-12 RX ADMIN — SERTRALINE 150 MILLIGRAM(S): 25 TABLET, FILM COATED ORAL at 11:25

## 2024-02-12 RX ADMIN — Medication 75 MICROGRAM(S): at 05:39

## 2024-02-12 RX ADMIN — Medication 3 MILLILITER(S): at 14:21

## 2024-02-12 RX ADMIN — Medication 325 MILLIGRAM(S): at 11:25

## 2024-02-12 RX ADMIN — Medication 10 MILLIEQUIVALENT(S): at 11:25

## 2024-02-12 RX ADMIN — Medication 1 MILLIGRAM(S): at 11:25

## 2024-02-12 RX ADMIN — PANTOPRAZOLE SODIUM 40 MILLIGRAM(S): 20 TABLET, DELAYED RELEASE ORAL at 05:39

## 2024-02-12 RX ADMIN — Medication 300 MILLIGRAM(S): at 05:39

## 2024-02-12 RX ADMIN — QUETIAPINE FUMARATE 50 MILLIGRAM(S): 200 TABLET, FILM COATED ORAL at 21:41

## 2024-02-12 RX ADMIN — Medication 20 MILLIGRAM(S): at 05:39

## 2024-02-12 RX ADMIN — Medication 3 MILLILITER(S): at 09:12

## 2024-02-12 RX ADMIN — QUETIAPINE FUMARATE 12.5 MILLIGRAM(S): 200 TABLET, FILM COATED ORAL at 11:25

## 2024-02-12 RX ADMIN — Medication 3 MILLILITER(S): at 01:04

## 2024-02-12 NOTE — CONSULT NOTE ADULT - SUBJECTIVE AND OBJECTIVE BOX
Visit Information Date & Time Provider Department Dept. Phone Encounter #  
 1/6/2017  7:30 AM Eleonora Winkler MD [facility] [phone] [id number] Follow-up Instructions Return in about 6 months (around 7/6/2017). Upcoming Health Maintenance Date Due Hepatitis C Screening 1947 FOBT Q 1 YEAR AGE 50-75 3/21/1998 Pneumococcal 65+ High/Highest Risk (1 of 2 - PCV13) 3/21/2013 MEDICARE YEARLY EXAM 3/21/2013 EYE EXAM RETINAL OR DILATED Q1 1/30/2015 FOOT EXAM Q1 6/17/2017 HEMOGLOBIN A1C Q6M 6/23/2017 MICROALBUMIN Q1 12/23/2017 LIPID PANEL Q1 12/23/2017 GLAUCOMA SCREENING Q2Y 6/17/2018 DTaP/Tdap/Td series (2 - Td) 6/17/2026 Allergies as of 1/6/2017  Review Complete On: 1/6/2017 By: Eleonora Winkler MD  
  
 Severity Noted Reaction Type Reactions Metformin  11/01/2013   Side Effect Other (comments) Renal insufficiency Current Immunizations  Never Reviewed Name Date Influenza Vaccine 12/7/2015 Not reviewed this visit You Were Diagnosed With   
  
 Codes Comments Uncontrolled diabetes mellitus type 2 without complications, unspecified long term insulin use status ([facility]    -  Primary ICD-10-CM: E11.65 ICD-9-CM: 250.02 Hypertriglyceridemia     ICD-10-CM: E78.1 ICD-9-CM: 272.1 Mixed hyperlipidemia     ICD-10-CM: E78.2 ICD-9-CM: 272.2 Acute bilateral low back pain without sciatica     ICD-10-CM: M54.5 ICD-9-CM: 724.2, 338.19 Arthritis of lumbar spine     ICD-10-CM: M47.9 ICD-9-CM: 721.3 Essential hypertension     ICD-10-CM: I10 
ICD-9-CM: 401.9 CKD (chronic kidney disease) stage 3, GFR 30-59 ml/min     ICD-10-CM: N18.3 ICD-9-CM: 585.3 Gout, unspecified cause, unspecified chronicity, unspecified site     ICD-10-CM: M10.9 ICD-9-CM: 274.9 Medication monitoring encounter     ICD-10-CM: Z51.81 
ICD-9-CM: V58.83 Vitals BP Pulse Temp Resp Height(growth percentile) Weight(growth percentile) 153/80 (BP 1 Location: Right arm, BP Patient Position: Sitting) 72 97 °F (36.1 °C) (Oral) 20 5' 7\" (1.702 m) 199 lb 8 oz (90.5 kg) BMI Smoking Status 31.25 kg/m2 Never Smoker BMI and BSA Data Body Mass Index Body Surface Area  
 31.25 kg/m 2 2.07 m 2 Preferred Pharmacy Pharmacy Name Phone [facility] [address] [facility], [address] [facility] [phone] Your Updated Medication List  
  
   
This list is accurate as of: 1/6/17  8:16 AM.  Always use your most recent med list.  
  
  
  
  
 ADULT LOW DOSE ASPIRIN 81 mg tablet Generic drug:  aspirin delayed-release Take 1 Tab by mouth daily. Indications: MYOCARDIAL INFARCTION PREVENTION  
  
 allopurinol 300 mg tablet Commonly known as:  Paula Hatfield Take 1 Tab by mouth daily. dilTIAZem 360 mg SR capsule Commonly known as:  [facility] Take 1 Cap by mouth daily. glyBURIDE 2.5 mg tablet Commonly known as:  Nikkie Aj Take 1 Tab by mouth two (2) times daily (with meals). HYDROcodone-acetaminophen 5-325 mg per tablet Commonly known as:  Tae Alford Take 1 Tab by mouth daily as needed for Pain. lisinopril 20 mg tablet Commonly known as:  Aiyana Cruz Take 1 Tab by mouth two (2) times a day. Prescriptions Printed Refills HYDROcodone-acetaminophen (NORCO) 5-325 mg per tablet 0 Sig: Take 1 Tab by mouth daily as needed for Pain. Class: Print Route: Oral  
  
Prescriptions Sent to Pharmacy Refills  
 dilTIAZem (TIAZAC) 360 mg SR capsule 1 Sig: Take 1 Cap by mouth daily. Class: Normal  
 Pharmacy: [id number] [address] [facility], [address] [facility] Ph #: [phone] Route: Oral  
 allopurinol (ZYLOPRIM) 300 mg tablet 1 Sig: Take 1 Tab by mouth daily.   
 Class: Normal  
 Pharmacy: [id number] [address], [address] [facility] Ph #: [phone] Route: Oral  
 glyBURIDE (DIABETA) 2.5 mg tablet 1 Sig: Take 1 Tab by mouth two (2) times daily (with meals). Class: Normal  
 Pharmacy: [id number] [address], [address] [facility] Ph #: [phone] Route: Oral  
 lisinopril (PRINIVIL, ZESTRIL) 20 mg tablet 1 Sig: Take 1 Tab by mouth two (2) times a day. Class: Normal  
 Pharmacy: [id number] [address], [address] [facility] Ph #: [phone] Route: Oral  
  
Follow-up Instructions Return in about 6 months (around 7/6/2017). To-Do List   
 01/06/2017 Lab:  HEMOGLOBIN A1C W/O EAG   
  
 01/06/2017 Lab:  LIPID PANEL   
  
 01/06/2017 Lab:  METABOLIC PANEL, COMPREHENSIVE Patient Instructions Please contact our office if you have any questions about your visit today. Introducing [facility]! Dear Surekha Cooley: Thank you for requesting a [employer] account. Our records indicate that you already have an active Axcient account. You can access your account anytime at [email or web address] Did you know that you can access your hospital and ER discharge instructions at any time in [employer]ient? You can also review all of your test results from your hospital stay or ER visit. Additional Information If you have questions, please visit the Frequently Asked Questions section of the Axcient website at [email or web address] Remember, A[employer]ient is NOT to be used for urgent needs. For medical emergencies, dial 911. Now available from your iPhone and Android! Please provide this summary of care documentation to your next provider. Your primary care clinician is listed as LARISA HURST. If you have any questions after today's visit, please call [phone]. [place], Division of Infectious Diseases  STEFFANIE Spivey S. Shah, Y. Patel, G. [facility]  [phone]    TYRON FORTUNE  89y, Female  [id number]    HPI--  HPI:  89 year old woman with hx of dementia, afib not on AC, hypothyroidism, frequent UTIs, GERD, and R total hip and knee replacement and presenting with about four days of a cough and wheezing. Pt is accompanied by daughter who is providing some of the history. Pt lives at the [facility] in [place], [place]. Per daughter, pt has been coughing, with some wheezing since this past weekend. Pt visited her PCP, who started her on doxycycline however it was not well tolerated so pt discontinued it. Pt was not improving so daughter brought pt to ED. Pt has not taken any other medications for symptom relieve. Of note there are a few COVID + cases at the pt's residence. Denies SOB at rest, dizziness, subjective fevers, chest pain, back pain, or pain or burning on urination,     ED course  Vitals: T 97.4F, HR 97, /61, RR 18, SpO2 92% on RA  Significant labs: Cl 109, glu 144, trop 139, proBNP 454 UA with trace leuk est, trace blood, few bacteria, RSV positive  Imaging:   - CXR small hiatal hernia, chronic thoracolumbar scoliosis and degenerative changes of the spine as well as chornic arthropathy of both shoulders  - CT Chest: Bilateral mild bronchiectasis as on June 29, 2019 with superimposed mild impacted distal and dilated airways may be due to internal secretions.  - CT Angio: no PE  EKG: A flutter with variable AV block, HR 86, QTc 461   In ED patient given: ceftriaxone 1000mg x1, azithromycin 500mg x1, solumedrol 125mg IVP, 500mL NS bolus x1, Duoneb x2    (08 Feb 2024 21:25)    Pt seen at bedside  on RA      Active Medications--  acetaminophen     Tablet .. 650 milliGRAM(s) Oral every 6 hours PRN  albuterol/ipratropium for Nebulization 3 milliLiter(s) Nebulizer every 6 hours  azithromycin  IVPB 500 milliGRAM(s) IV Intermittent every 24 hours  cefTRIAXone   IVPB 1000 milliGRAM(s) IV Intermittent every 24 hours  diltiazem    milliGRAM(s) Oral daily  ferrous    sulfate 325 milliGRAM(s) Oral daily  folic acid 1 milliGRAM(s) Oral daily  furosemide    Tablet 20 milliGRAM(s) Oral daily  levothyroxine 75 MICROGram(s) Oral daily  ondansetron Injectable 4 milliGRAM(s) IV Push every 6 hours PRN  pantoprazole    Tablet 40 milliGRAM(s) Oral before breakfast  potassium chloride    Tablet ER 10 milliEquivalent(s) Oral daily  QUEtiapine 50 milliGRAM(s) Oral at bedtime  QUEtiapine 12.5 milliGRAM(s) Oral <User Schedule>  sertraline 150 milliGRAM(s) Oral daily    Antimicrobials:   azithromycin  IVPB 500 milliGRAM(s) IV Intermittent every 24 hours  cefTRIAXone   IVPB 1000 milliGRAM(s) IV Intermittent every 24 hours    Immunologic:     ROS:  CONSTITUTIONAL: No fevers or chills. No weakness or headache. No weight changes.  EYES/ENT: No visual or hearing changes. No sore throat or throat pain .  NECK: No pain or stiffness  RESPIRATORY: No cough, wheezing, or hemoptysis. No shortness of breath  CARDIOVASCULAR: No chest pain or palpitations  GASTROINTESTINAL: No abdominal pain. No nausea or vomiting. No diarrhea or constipation.  GENITOURINARY: No dysuria, frequency or hematuria  NEUROLOGICAL: No numbness or weakness  SKIN: No itching or rashes  PSYCHIATRIC: Pleasant. Appropriate affect    Allergies: Keflex (Other (Mild))    PMH -- Atrial fibrillation    GERD (gastroesophageal reflux disease)    Hypothyroidism, adult    HTN (hypertension)    Degenerative disc disease, cervical    Degenerative disc disease, lumbar    OA (osteoarthritis)    Sarcoma of omentum    Afib    GERD (gastroesophageal reflux disease)    Hypothyroidism    Sarcoma of omentum      PSH -- S/P tonsillectomy    S/P oophorectomy    Abdominal mass    After cataract not obscuring vision, bilateral    Abdominal wall mass    History of total hip replacement, right    H/O total knee replacement, right      FH -- Family history of emphysema    Family history of early CAD    Family history of breast cancer      Social History --  EtOH: denies   Tobacco: denies   Drug Use: denies     Travel/Environmental/Occupational History:    Physical Exam--  Vital Signs Last 24 Hrs  T(F): 98.3 (12 Feb 2024 12:22), Max: 98.3 (12 Feb 2024 05:03)  HR: 76 (12 Feb 2024 12:22) (67 - 82)  BP: 123/75 (12 Feb 2024 12:22) (96/65 - 161/80)  RR: 18 (12 Feb 2024 12:22) (18 - 18)  SpO2: 93% (12 Feb 2024 12:22) (93% - 99%)  General: nontoxic-appearing, no acute distress  HEENT: NC/AT, EOMI,   Lungs: Clear bilaterally without rales, wheezing or rhonchi  Heart: Regular rate and rhythm.  Extremities: No cyanosis or clubbing. No edema.   Skin: Warm. Dry. Good turgor.     Laboratory & Imaging Data:  CBC:                       13.8   8.94  )-----------( 192      ( 12 Feb 2024 08:36 )             42.2     CMP: 02-12    142  |  108  |  14  ----------------------------<  99  3.5   |  29  |  0.81    Ca    8.1<L>      12 Feb 2024 08:36        Urinalysis Basic - ( 12 Feb 2024 08:36 )    Color: x / Appearance: x / SG: x / pH: x  Gluc: 99 mg/dL / Ketone: x  / Bili: x / Urobili: x   Blood: x / Protein: x / Nitrite: x   Leuk Esterase: x / RBC: x / WBC x   Sq Epi: x / Non Sq Epi: x / Bacteria: x        Microbiology: reviewed    Culture - Urine (collected 02-08-24 @ 17:05)  Source: Clean Catch Clean Catch (Midstream)  Final Report (02-09-24 @ 22:29):    No growth    Culture - Blood (collected 02-08-24 @ 15:10)  Source: .Blood Blood-Peripheral  Preliminary Report (02-11-24 @ 23:01):    No growth at 72 Hours    Culture - Blood (collected 02-08-24 @ 14:55)  Source: .Blood Blood-Peripheral  Preliminary Report (02-11-24 @ 23:01):    No growth at 72 Hours          Radiology: reviewed    < from: CT Angio Chest PE Protocol w/ IV Cont (02.08.24 @ 19:15) >    ACC: [id number] EXAM:  CT ANGIO CHEST PULM ART WAWIC   ORDERED BY:  VANDANA MIN     PROCEDURE DATE:  02/08/2024          INTERPRETATION:  CLINICAL INFORMATION: 89-year-old female with cough and   hypoxia    COMPARISON: CT scan 06/29/2019 and CT earlier 02/08/2024    CONTRAST/COMPLICATIONS:  IV Contrast: Omnipaque 350  70 cc administered   30 cc discarded  Oral Contrast: NONE  Complications: None reported at time of study completion    PROCEDURE:  CT Angiography of the Chest.  Sagittal and coronal reformats were performed as well as 3D (MIP)   reconstructions.    FINDINGS:    LUNGS AND AIRWAYS: Patent central airways. Impacted small airways and   mild bronchiectasis as previously described. Distal lower lobe impacted   subsegmental limits airways.  PLEURA: No pleural effusion.  MEDIASTINUM AND WILLARD: No lymphadenopathy. Distended upper esophagus with   fluid debris level.  VESSELS: No  pulmonary embolus  HEART: Heart size is normal. No pericardial effusion.  CHEST WALL AND LOWER NECK: Within normal limits.  VISUALIZED UPPER ABDOMEN: Moderate hiatus hernia Within normal limits.  BONES: Degenerative changes.    IMPRESSION:  No pulmonary embolus.  Mild bronchiectasis and impacted distal airways as previously described        --- End of Report ---            PAVAN CHA MD; Attending Radiologist  This document has been electronically signed. Feb 8 2024  8:12PM    < end of copied text >  < from: CT Chest No Cont (02.08.24 @ 17:04) >    ACC: [id number] EXAM:  CT CHEST   ORDERED BY:  VANDANA MIN     PROCEDURE DATE:  02/08/2024          INTERPRETATION:  CLINICAL INDICATION: COUGH, SHORTNESS OF BREATH.    Axial CT images of the chest are obtained without intravenous   administration of contrast.    Comparison is made with the prior chest CT of June 29, 2019.    No enlarged axillary, mediastinal or hilar lymph nodes.    No pleural or pericardial effusion. Heart size is normal. Vascular   calcifications with involvement of the aortaand the coronary arteries.   Aorta is tortuous.    Evaluation of the upper abdomen demonstrate moderate sized hiatal hernia.   Tiny adrenal gland calcifications as on the prior study.    Evaluation of the lungs demonstrate mild bilateral bronchiectasis most   notable within the lower lungs within the lower lobes and the right   middle lobe as on the prior study. Superimposed impaction of some of the   bilateral lower lobe subsegmental dilated airways may be due to internal   secretions. Tree-in-bud punctate nodular opacities within the peripheral   aspect of the right upper lobe due to impacted distal airways may also be   due to secretions. Minimal left lower lobe subsegmental atelectasis. No   consolidation.    No central endobronchial lesions.    Degenerative changes of the spine and the shoulders. Spinal scoliosis.    IMPRESSION:    Bilateral mild bronchiectasis as on June 29, 2019 with superimposed mild   impacted distal and dilated airways may be due to internal secretions.    --- End of Report ---            EDDY CHANG MD; Attending Radiologist  This document has been electronically signed. Feb 8 2024  5:26PM    < end of copied text >  < from: Xray Chest 1 View- PORTABLE-Urgent (02.08.24 @ 15:44) >    ACC: [id number] EXAM:  XR CHEST PORTABLE URGENT 1V   ORDERED BY: CHITRA FERNÁNDEZ     PROCEDURE DATE:  02/08/2024          INTERPRETATION:  An AP portable semiupright chest x-ray was performed for   sepsis.    Comparison is made to 3/4/2023.    The lungs are clear bilaterally. No infiltrates are seen. There is no   pneumothorax. There are no pleural effusions. There is a small hiatus   hernia. There is a tortuous descending thoracic aorta, quite likely   secondary to chronic scoliosis. The cardiac silhouette may be magnified   by technique. There is no CHF. Chronic thoracolumbar scoliosis and   degenerative changes of the spine as well as chronic arthropathy of both   shoulders is seen.    IMPRESSION:  1. Small hiatus hernia.  2. No evidence of pneumonia, pleural effusion or CHF.  3. Chronic thoracolumbar scoliosis and degenerative changes of the spine   as well as chronic arthropathy of both shoulders.    --- End of Report ---            FREEDOM MORE MD; Attending Radiologist  This documenthas been electronically signed. Feb 8 2024  3:48PM    < end of copied text >

## 2024-02-12 NOTE — PROGRESS NOTE ADULT - ASSESSMENT
89 year old woman with hx of dementia, afib not on AC, hypothyroidism, frequent UTIs, GERD, and R total hip and knee replacement and presenting with about four days of a cough and wheezing. admitted for  sepsis sec to RSV+,  acute hypoxic respiratory failure and r/o acs    # Acute hypoxic respiratory failure.   ·  RSV+  sepsis met on admission, tachycardia  and tachypnea  - Pt likely with reactive airway 2/2 acute viral illness, possible concomitant bacterial infection   - continue with azithromycin and ceftriaxone  - continue duonebs  - Pulmonology consulted (Dr. Reece), f/u recs.     #elev trops- likely demand ischemia in setting of acute illness  cards cs noted      #Frequent UTI.   - hold home methenamine, pt receiving IV abx   - F/u urine cx.    #Dementia.   ·  Plan: Chronic, pt follows with Dr. Derian Whiting (neuro varghese)   - c/w Seroquel 12.5mg in AM and 50mg in PM for delirium/agitation.    #chr Afib  - EKG: A flutter with variable AV block, HR 86, QTc 461  - c/w cardizem 300mg daily  - c/w Lasix 20mg daily for leg swelling   - currently rate controlled  - pt not on AC   - continue to monitor on telemetry  - F/u TTE.    #hypothyroidism  - c/w levothyroxine 75 mcg daily.    #: Anxiety and depression.     - c/w sertraline 150mg daily.    # GERD (gastroesophageal reflux disease).   - c/w pantoprazole 40mg daily    #: DVT ppx:  - Lovenox    #GOC:  - Pt is full code.    dc planning home withcare    [facility]   [phone]

## 2024-02-12 NOTE — CONSULT NOTE ADULT - CONSULT REASON
RSV
Troponin Leak
Shortness of breath, atrial fibrillation, elevated cardiac enzymes
viral syndrome  weakness  malaise

## 2024-02-12 NOTE — CASE MANAGEMENT PROGRESS NOTE - NSCMPROGRESSNOTE_GEN_ALL_CORE
CM called son Clarke [phone]. and left msg re POC. As per MD ready for DC to home tomorrow 2/13. Star patient and ref to [facility] [phone] initialed and will be sent prior to DC.  CM called son Clarke [phone]. and left msg re POC. As per MD ready for DC to home tomorrow 2/13. Star patient and ref to [facility] [phone] initiated and will be sent prior to DC.   Son Clarke called and he and sister are in agreement with transition to home 2/13. Clarke will pick his mom up and bring her home. CM noted patient off oxygen when in her room. CM will f/u 2/13.

## 2024-02-12 NOTE — CONSULT NOTE ADULT - REASON FOR ADMISSION
acute hypoxic respiratory failure 2/2 RSV

## 2024-02-12 NOTE — CONSULT NOTE ADULT - ASSESSMENT
89 year old woman with hx of dementia, afib not on AC, hypothyroidism, frequent UTIs, GERD, and R total hip and knee replacement and presenting with about four days of a cough and wheezing.  Admitted for Sepsis and AHRF 2/2 RSV    RSV PNA  R/o superimposed bacterial PNA  AHRF     Imaging reviewed:  - CXR smal hiatal hernia, chronic thoracolumbar scoliosis and degenerative changes of the spine as well as chornic arthropathy of both shoulders  - CT Chest: Bilateral mild bronchiectasis as on June 29, 2019 with superimposed mild impacted distal and dilated airways may be due to internal secretions.  - CT Angio: no PE  BCx/UCx NGTD    Recommendations:   Overall clinical picture in setting of RSV; no obvious consolidation to suggest bacterial infection  S/p ceftriaxone and azithromycin since 2/8, can discontinue at this time and monitor off Abx  Trend temps/WBC  supportive care, O2 as needed  Appreciate pulm  Additional care per primary team    Infectious Diseases will continue to follow. Please call with any questions.   Sandra Lind M.D.  [facility] Division of Infectious Diseases [phone]  For after 5 P.M. and weekends, please call [phone]

## 2024-02-13 LAB
ANION GAP SERPL CALC-SCNC: 4 MMOL/L — LOW (ref 5–17)
BUN SERPL-MCNC: 15 MG/DL — SIGNIFICANT CHANGE UP (ref 7–23)
CALCIUM SERPL-MCNC: 8.1 MG/DL — LOW (ref 8.5–10.1)
CHLORIDE SERPL-SCNC: 110 MMOL/L — HIGH (ref 96–108)
CO2 SERPL-SCNC: 28 MMOL/L — SIGNIFICANT CHANGE UP (ref 22–31)
CREAT SERPL-MCNC: 0.88 MG/DL — SIGNIFICANT CHANGE UP (ref 0.5–1.3)
CULTURE RESULTS: SIGNIFICANT CHANGE UP
CULTURE RESULTS: SIGNIFICANT CHANGE UP
EGFR: 60 ML/MIN/1.73M2 — SIGNIFICANT CHANGE UP
GLUCOSE SERPL-MCNC: 105 MG/DL — HIGH (ref 70–99)
HCT VFR BLD CALC: 43.4 % — SIGNIFICANT CHANGE UP (ref 34.5–45)
HGB BLD-MCNC: 14.1 G/DL — SIGNIFICANT CHANGE UP (ref 11.5–15.5)
MCHC RBC-ENTMCNC: 30.9 PG — SIGNIFICANT CHANGE UP (ref 27–34)
MCHC RBC-ENTMCNC: 32.5 GM/DL — SIGNIFICANT CHANGE UP (ref 32–36)
MCV RBC AUTO: 95.2 FL — SIGNIFICANT CHANGE UP (ref 80–100)
NRBC # BLD: 0 /100 WBCS — SIGNIFICANT CHANGE UP (ref 0–0)
PLATELET # BLD AUTO: 196 K/UL — SIGNIFICANT CHANGE UP (ref 150–400)
POTASSIUM SERPL-MCNC: 3.7 MMOL/L — SIGNIFICANT CHANGE UP (ref 3.5–5.3)
POTASSIUM SERPL-SCNC: 3.7 MMOL/L — SIGNIFICANT CHANGE UP (ref 3.5–5.3)
RBC # BLD: 4.56 M/UL — SIGNIFICANT CHANGE UP (ref 3.8–5.2)
RBC # FLD: 15.1 % — HIGH (ref 10.3–14.5)
SODIUM SERPL-SCNC: 142 MMOL/L — SIGNIFICANT CHANGE UP (ref 135–145)
SPECIMEN SOURCE: SIGNIFICANT CHANGE UP
SPECIMEN SOURCE: SIGNIFICANT CHANGE UP
WBC # BLD: 9.6 K/UL — SIGNIFICANT CHANGE UP (ref 3.8–10.5)
WBC # FLD AUTO: 9.6 K/UL — SIGNIFICANT CHANGE UP (ref 3.8–10.5)

## 2024-02-13 RX ORDER — ACETAMINOPHEN 500 MG
650 TABLET ORAL EVERY 8 HOURS
Refills: 0 | Status: DISCONTINUED | OUTPATIENT
Start: 2024-02-13 | End: 2024-02-14

## 2024-02-13 RX ADMIN — Medication 1 MILLIGRAM(S): at 12:07

## 2024-02-13 RX ADMIN — Medication 3 MILLILITER(S): at 08:11

## 2024-02-13 RX ADMIN — Medication 3 MILLILITER(S): at 20:04

## 2024-02-13 RX ADMIN — Medication 3 MILLILITER(S): at 14:21

## 2024-02-13 RX ADMIN — Medication 650 MILLIGRAM(S): at 12:06

## 2024-02-13 RX ADMIN — PANTOPRAZOLE SODIUM 40 MILLIGRAM(S): 20 TABLET, DELAYED RELEASE ORAL at 05:27

## 2024-02-13 RX ADMIN — QUETIAPINE FUMARATE 12.5 MILLIGRAM(S): 200 TABLET, FILM COATED ORAL at 09:56

## 2024-02-13 RX ADMIN — Medication 75 MICROGRAM(S): at 05:27

## 2024-02-13 RX ADMIN — Medication 20 MILLIGRAM(S): at 05:27

## 2024-02-13 RX ADMIN — Medication 325 MILLIGRAM(S): at 12:06

## 2024-02-13 RX ADMIN — SERTRALINE 150 MILLIGRAM(S): 25 TABLET, FILM COATED ORAL at 12:06

## 2024-02-13 RX ADMIN — Medication 650 MILLIGRAM(S): at 22:40

## 2024-02-13 RX ADMIN — QUETIAPINE FUMARATE 50 MILLIGRAM(S): 200 TABLET, FILM COATED ORAL at 22:14

## 2024-02-13 RX ADMIN — Medication 650 MILLIGRAM(S): at 22:08

## 2024-02-13 RX ADMIN — Medication 300 MILLIGRAM(S): at 05:27

## 2024-02-13 RX ADMIN — Medication 10 MILLIEQUIVALENT(S): at 12:07

## 2024-02-13 RX ADMIN — Medication 3 MILLILITER(S): at 00:52

## 2024-02-13 NOTE — PROGRESS NOTE ADULT - ASSESSMENT
89 year old woman with hx of dementia, afib not on AC, hypothyroidism, frequent UTIs, GERD, and R total hip and knee replacement and presenting with about four days of a cough and wheezing. admitted for  sepsis sec to RSV+,  acute hypoxic respiratory failure and r/o acs    # Acute hypoxic respiratory failure.   ·  RSV+  sepsis met on admission, tachycardia  and tachypnea  - Pt likely with reactive airway 2/2 acute viral illness, possible superimposed bacterial infection   sp course of azithromycin and ceftriaxone  - continue duonebs  - Pulmonology f/u recs.     #elev trops- likely demand ischemia in setting of acute illness  cards cs noted      #Frequent UTI.   - hold home methenamine, pt receiving IV abx   - F/u urine cx.    #Dementia.   ·  Plan: Chronic, pt follows with Dr. Derian Whiting (neuro varghese)   - c/w Seroquel 12.5mg in AM and 50mg in PM for delirium/agitation.    #chr Afib  - EKG: A flutter with variable AV block, HR 86, QTc 461  - c/w cardizem 300mg daily  - c/w Lasix 20mg daily for leg swelling   - currently rate controlled  - pt not on AC   - continue to monitor on telemetry  - F/u TTE.    #hypothyroidism  - c/w levothyroxine 75 mcg daily.    #: Anxiety and depression.     - c/w sertraline 150mg daily.    # GERD (gastroesophageal reflux disease).   - c/w pantoprazole 40mg daily    #: DVT ppx:  - Lovenox    #GOC:  - Pt is full code.    dc planning home withHC and +/- o2    d/w dgtr at length on the phone  will start tylenol atc for pain, will consider adding tramdol if no improvement and consider imaging as needed    [facility]   [phone]

## 2024-02-13 NOTE — PROGRESS NOTE ADULT - ASSESSMENT
The patient is a 89 year old female with a history of hypothyroidism, GERD, paroxysmal atrial fibrillation, dementia who presents with cough and wheezing in the setting of RSV.    Plan:  - Initial ECG with coarse atrial fibrillation vs. atrial flutter with variable block  - Currently in sinus rhythm  - CTA chest negative for PE; noted is bronchiectasis and impacted distal airways  -   - Troponin mildly elevated at 139 and trended down; due to demand ischemia from underlying infection  - RSV positive  - Echo with normal LV systolic function, no significant valve issues  - Continue diltiazem  mg daily  - Continue furosemide 20 mg daily

## 2024-02-13 NOTE — PROGRESS NOTE ADULT - ASSESSMENT
89 year old woman with hx of dementia, afib not on AC, hypothyroidism, frequent UTIs, GERD, and R total hip and knee replacement and presenting with about four days of a cough and wheezing.    rsv viral infection  AF  OP  OA  Bronchiectasis  Hypothyroidism  UTI hx  GERD  Weakness - Malaise -     plan for HOME o2  cm following  vs noted    bronchodilators  chest PT  I alena  o2 support as needed  goal sat > 88 pct  HOB elev  asp prec  oral hygiene  skin care  Hypersal - Mucolytics and Cough Rx regimen  CT chest reviewed  isol precs for RSV and sx management  hydration - nutrition

## 2024-02-13 NOTE — CASE MANAGEMENT PROGRESS NOTE - NSCMPROGRESSNOTE_GEN_ALL_CORE
Patient now experiencing o2 requirements at 3L NC and decreased participation with PT.  Will monitor patient needs.  Met with patient and daughter at bedside to discuss.  Daughter Poly states that they will have 24/7 coverage for patient as patient cannot self manage o2.  Will refer to [facility] when D/C ready.  To remain available.

## 2024-02-13 NOTE — PROGRESS NOTE ADULT - ASSESSMENT
89 year old woman with hx of dementia, afib not on AC, hypothyroidism, frequent UTIs, GERD, and R total hip and knee replacement and presenting with about four days of a cough and wheezing.  Admitted for Sepsis and AHRF 2/2 RSV    RSV PNA  R/o superimposed bacterial PNA  AHRF     Imaging reviewed:  - CXR smal hiatal hernia, chronic thoracolumbar scoliosis and degenerative changes of the spine as well as chornic arthropathy of both shoulders  - CT Chest: Bilateral mild bronchiectasis as on June 29, 2019 with superimposed mild impacted distal and dilated airways may be due to internal secretions.  - CT Angio: no PE  BCx/UCx NGTD    Recommendations:   Overall clinical picture in setting of RSV; no obvious consolidation to suggest bacterial infection  S/p ceftriaxone and azithromycin since 2/8, can discontinue at this time and monitor off Abx  Trend temps/WBC  supportive care, O2 as needed  Appreciate pulm  Additional care per primary team    Infectious Diseases will continue to follow. Please call with any questions.   Sandra Lind M.D.  [facility] Division of Infectious Diseases [phone]  For after 5 P.M. and weekends, please call [phone]   89 year old woman with hx of dementia, afib not on AC, hypothyroidism, frequent UTIs, GERD, and R total hip and knee replacement and presenting with about four days of a cough and wheezing.  Admitted for Sepsis and AHRF 2/2 RSV    RSV PNA  R/o superimposed bacterial PNA  AHRF     Imaging reviewed:  - CXR smal hiatal hernia, chronic thoracolumbar scoliosis and degenerative changes of the spine as well as chornic arthropathy of both shoulders  - CT Chest: Bilateral mild bronchiectasis as on June 29, 2019 with superimposed mild impacted distal and dilated airways may be due to internal secretions.  - CT Angio: no PE  BCx/UCx NGTD    Recommendations:   Overall clinical picture in setting of RSV; no obvious consolidation to suggest bacterial infection  S/p ceftriaxone and azithromycin since 2/8, discontinued 2/11  Monitor off Abx  Trend temps/WBC  supportive care, O2 as needed  Appreciate pulm  Additional care per primary team    Infectious Diseases will continue to follow. Please call with any questions.   Sandra Lind M.D.  [facility] Division of Infectious Diseases [phone]  For after 5 P.M. and weekends, please call [phone]

## 2024-02-14 ENCOUNTER — TRANSCRIPTION ENCOUNTER (OUTPATIENT)
Age: 89
End: 2024-02-14

## 2024-02-14 VITALS — OXYGEN SATURATION: 96 %

## 2024-02-14 LAB
ANION GAP SERPL CALC-SCNC: 3 MMOL/L — LOW (ref 5–17)
BUN SERPL-MCNC: 13 MG/DL — SIGNIFICANT CHANGE UP (ref 7–23)
CALCIUM SERPL-MCNC: 7.6 MG/DL — LOW (ref 8.5–10.1)
CHLORIDE SERPL-SCNC: 108 MMOL/L — SIGNIFICANT CHANGE UP (ref 96–108)
CO2 SERPL-SCNC: 27 MMOL/L — SIGNIFICANT CHANGE UP (ref 22–31)
CREAT SERPL-MCNC: 0.81 MG/DL — SIGNIFICANT CHANGE UP (ref 0.5–1.3)
EGFR: 66 ML/MIN/1.73M2 — SIGNIFICANT CHANGE UP
GLUCOSE SERPL-MCNC: 105 MG/DL — HIGH (ref 70–99)
HCT VFR BLD CALC: 39.4 % — SIGNIFICANT CHANGE UP (ref 34.5–45)
HGB BLD-MCNC: 13.2 G/DL — SIGNIFICANT CHANGE UP (ref 11.5–15.5)
MCHC RBC-ENTMCNC: 31.7 PG — SIGNIFICANT CHANGE UP (ref 27–34)
MCHC RBC-ENTMCNC: 33.5 GM/DL — SIGNIFICANT CHANGE UP (ref 32–36)
MCV RBC AUTO: 94.5 FL — SIGNIFICANT CHANGE UP (ref 80–100)
NRBC # BLD: 0 /100 WBCS — SIGNIFICANT CHANGE UP (ref 0–0)
PLATELET # BLD AUTO: 194 K/UL — SIGNIFICANT CHANGE UP (ref 150–400)
POTASSIUM SERPL-MCNC: 3.7 MMOL/L — SIGNIFICANT CHANGE UP (ref 3.5–5.3)
POTASSIUM SERPL-SCNC: 3.7 MMOL/L — SIGNIFICANT CHANGE UP (ref 3.5–5.3)
RBC # BLD: 4.17 M/UL — SIGNIFICANT CHANGE UP (ref 3.8–5.2)
RBC # FLD: 14.7 % — HIGH (ref 10.3–14.5)
SODIUM SERPL-SCNC: 138 MMOL/L — SIGNIFICANT CHANGE UP (ref 135–145)
WBC # BLD: 9.02 K/UL — SIGNIFICANT CHANGE UP (ref 3.8–10.5)
WBC # FLD AUTO: 9.02 K/UL — SIGNIFICANT CHANGE UP (ref 3.8–10.5)

## 2024-02-14 PROCEDURE — 87637 SARSCOV2&INF A&B&RSV AMP PRB: CPT

## 2024-02-14 PROCEDURE — 85025 COMPLETE CBC W/AUTO DIFF WBC: CPT

## 2024-02-14 PROCEDURE — 96374 THER/PROPH/DIAG INJ IV PUSH: CPT

## 2024-02-14 PROCEDURE — 82803 BLOOD GASES ANY COMBINATION: CPT

## 2024-02-14 PROCEDURE — 84484 ASSAY OF TROPONIN QUANT: CPT

## 2024-02-14 PROCEDURE — 84443 ASSAY THYROID STIM HORMONE: CPT

## 2024-02-14 PROCEDURE — 83605 ASSAY OF LACTIC ACID: CPT

## 2024-02-14 PROCEDURE — 80048 BASIC METABOLIC PNL TOTAL CA: CPT

## 2024-02-14 PROCEDURE — 84145 PROCALCITONIN (PCT): CPT

## 2024-02-14 PROCEDURE — 87040 BLOOD CULTURE FOR BACTERIA: CPT

## 2024-02-14 PROCEDURE — 97530 THERAPEUTIC ACTIVITIES: CPT

## 2024-02-14 PROCEDURE — 97116 GAIT TRAINING THERAPY: CPT

## 2024-02-14 PROCEDURE — 94760 N-INVAS EAR/PLS OXIMETRY 1: CPT

## 2024-02-14 PROCEDURE — 93005 ELECTROCARDIOGRAM TRACING: CPT

## 2024-02-14 PROCEDURE — 81001 URINALYSIS AUTO W/SCOPE: CPT

## 2024-02-14 PROCEDURE — 94640 AIRWAY INHALATION TREATMENT: CPT

## 2024-02-14 PROCEDURE — 87086 URINE CULTURE/COLONY COUNT: CPT

## 2024-02-14 PROCEDURE — 83735 ASSAY OF MAGNESIUM: CPT

## 2024-02-14 PROCEDURE — 93306 TTE W/DOPPLER COMPLETE: CPT

## 2024-02-14 PROCEDURE — 85730 THROMBOPLASTIN TIME PARTIAL: CPT

## 2024-02-14 PROCEDURE — 97162 PT EVAL MOD COMPLEX 30 MIN: CPT

## 2024-02-14 PROCEDURE — 83880 ASSAY OF NATRIURETIC PEPTIDE: CPT

## 2024-02-14 PROCEDURE — 85610 PROTHROMBIN TIME: CPT

## 2024-02-14 PROCEDURE — 71275 CT ANGIOGRAPHY CHEST: CPT | Mod: QQ

## 2024-02-14 PROCEDURE — 85027 COMPLETE CBC AUTOMATED: CPT

## 2024-02-14 PROCEDURE — 71250 CT THORAX DX C-: CPT | Mod: QQ

## 2024-02-14 PROCEDURE — 71045 X-RAY EXAM CHEST 1 VIEW: CPT

## 2024-02-14 PROCEDURE — 36415 COLL VENOUS BLD VENIPUNCTURE: CPT

## 2024-02-14 PROCEDURE — 80053 COMPREHEN METABOLIC PANEL: CPT

## 2024-02-14 PROCEDURE — 99285 EMERGENCY DEPT VISIT HI MDM: CPT | Mod: 25

## 2024-02-14 RX ORDER — ACETAMINOPHEN 500 MG
2 TABLET ORAL
Qty: 0 | Refills: 0 | DISCHARGE
Start: 2024-02-14

## 2024-02-14 RX ORDER — ALBUTEROL 90 UG/1
2 AEROSOL, METERED ORAL
Qty: 1 | Refills: 0
Start: 2024-02-14

## 2024-02-14 RX ADMIN — Medication 650 MILLIGRAM(S): at 11:40

## 2024-02-14 RX ADMIN — Medication 300 MILLIGRAM(S): at 06:04

## 2024-02-14 RX ADMIN — Medication 650 MILLIGRAM(S): at 06:33

## 2024-02-14 RX ADMIN — SERTRALINE 150 MILLIGRAM(S): 25 TABLET, FILM COATED ORAL at 11:40

## 2024-02-14 RX ADMIN — Medication 10 MILLIEQUIVALENT(S): at 11:41

## 2024-02-14 RX ADMIN — Medication 75 MICROGRAM(S): at 06:02

## 2024-02-14 RX ADMIN — QUETIAPINE FUMARATE 12.5 MILLIGRAM(S): 200 TABLET, FILM COATED ORAL at 08:37

## 2024-02-14 RX ADMIN — Medication 20 MILLIGRAM(S): at 06:03

## 2024-02-14 RX ADMIN — Medication 3 MILLILITER(S): at 01:28

## 2024-02-14 RX ADMIN — Medication 1 MILLIGRAM(S): at 11:41

## 2024-02-14 RX ADMIN — Medication 3 MILLILITER(S): at 12:35

## 2024-02-14 RX ADMIN — Medication 325 MILLIGRAM(S): at 11:41

## 2024-02-14 RX ADMIN — Medication 650 MILLIGRAM(S): at 12:10

## 2024-02-14 RX ADMIN — Medication 650 MILLIGRAM(S): at 06:03

## 2024-02-14 RX ADMIN — PANTOPRAZOLE SODIUM 40 MILLIGRAM(S): 20 TABLET, DELAYED RELEASE ORAL at 06:02

## 2024-02-14 NOTE — GOALS OF CARE CONVERSATION - ADVANCED CARE PLANNING - CONVERSATION DETAILS
Palliative care SW spoke with patient's children Clarke and Poly by phone. Reviewed patient's medical and social history as well as events leading to patient's hospitalization. Writer discussed patient's current diagnosis (Acute hypoxic respiratory failure 2/2 to RSV, Dementia, Afib, frequent UTI's, GERD), medical condition and management. Children report patient has a HCP with son Clarke as health care agent. SW inquired about patient's wishes regarding extent of medical care to be provided including thoughts regarding cardiopulmonary resuscitation and mechanical ventilation/intubation. Family showed insight into patient's medical condition. They state that patient's wishes would be for DNR/DNI. Reviewed MOLST form. MOLST form completed with DNR/DNI orders and placed on patient's chart. All questions answered.  Psychosocial support provided.

## 2024-02-14 NOTE — PROGRESS NOTE ADULT - SUBJECTIVE AND OBJECTIVE BOX
Date/Time Patient Seen:  		  Referring MD:   Data Reviewed	       Patient is a 89y old  Female who presents with a chief complaint of acute hypoxic respiratory failure 2/2 RSV (13 Feb 2024 17:46)      Subjective/HPI     PAST MEDICAL & SURGICAL HISTORY:  Atrial fibrillation    GERD (gastroesophageal reflux disease)    Hypothyroidism, adult    HTN (hypertension)    Degenerative disc disease, cervical    Degenerative disc disease, lumbar    OA (osteoarthritis)    Sarcoma of omentum    Afib    GERD (gastroesophageal reflux disease)    Hypothyroidism    Sarcoma of omentum    S/P tonsillectomy    S/P oophorectomy    Abdominal mass  Sarcoma - 10/2015  Exp lap, Excision of abdominal mass - 10/2015    After cataract not obscuring vision, bilateral  b/l lens implants    Abdominal wall mass  Exp La, Excison of abdominal mass - 8/2015    History of total hip replacement, right    H/O total knee replacement, right          Medication list         MEDICATIONS  (STANDING):  acetaminophen     Tablet .. 650 milliGRAM(s) Oral every 8 hours  albuterol/ipratropium for Nebulization 3 milliLiter(s) Nebulizer every 6 hours  diltiazem    milliGRAM(s) Oral daily  ferrous    sulfate 325 milliGRAM(s) Oral daily  folic acid 1 milliGRAM(s) Oral daily  furosemide    Tablet 20 milliGRAM(s) Oral daily  levothyroxine 75 MICROGram(s) Oral daily  pantoprazole    Tablet 40 milliGRAM(s) Oral before breakfast  potassium chloride    Tablet ER 10 milliEquivalent(s) Oral daily  QUEtiapine 12.5 milliGRAM(s) Oral <User Schedule>  QUEtiapine 50 milliGRAM(s) Oral at bedtime  sertraline 150 milliGRAM(s) Oral daily    MEDICATIONS  (PRN):  acetaminophen     Tablet .. 650 milliGRAM(s) Oral every 6 hours PRN Temp greater or equal to 38C (100.4F), Mild Pain (1 - 3)  ondansetron Injectable 4 milliGRAM(s) IV Push every 6 hours PRN Nausea         Vitals log        ICU Vital Signs Last 24 Hrs  T(C): 36.9 (13 Feb 2024 12:15), Max: 36.9 (13 Feb 2024 12:15)  T(F): 98.5 (13 Feb 2024 12:15), Max: 98.5 (13 Feb 2024 12:15)  HR: 85 (13 Feb 2024 12:15) (74 - 85)  BP: 119/67 (13 Feb 2024 12:15) (119/67 - 155/79)  BP(mean): --  ABP: --  ABP(mean): --  RR: 18 (13 Feb 2024 12:15) (18 - 18)  SpO2: 95% (13 Feb 2024 14:21) (93% - 96%)    O2 Parameters below as of 13 Feb 2024 14:21  Patient On (Oxygen Delivery Method): nasal cannula, 3 L                 Input and Output:  I&O's Detail    12 Feb 2024 07:01  -  13 Feb 2024 07:00  --------------------------------------------------------  IN:  Total IN: 0 mL    OUT:    Voided (mL): 600 mL  Total OUT: 600 mL    Total NET: -600 mL          Lab Data                        14.1   9.60  )-----------( 196      ( 13 Feb 2024 08:45 )             43.4     02-13    142  |  110<H>  |  15  ----------------------------<  105<H>  3.7   |  28  |  0.88    Ca    8.1<L>      13 Feb 2024 08:45              Review of Systems	      Objective     Physical Examination    heart s1s2  lung dc BS  head nc      Pertinent Lab findings & Imaging      Angie:  NO   Adequate UO     I&O's Detail    12 Feb 2024 07:01  -  13 Feb 2024 07:00  --------------------------------------------------------  IN:  Total IN: 0 mL    OUT:    Voided (mL): 600 mL  Total OUT: 600 mL    Total NET: -600 mL               Discussed with:     Cultures:	        Radiology                            
Date/Time Patient Seen:  		  Referring MD:   Data Reviewed	       Patient is a 89y old  Female who presents with a chief complaint of acute hypoxic respiratory failure 2/2 RSV (13 Feb 2024 20:45)      Subjective/HPI     PAST MEDICAL & SURGICAL HISTORY:  Atrial fibrillation    GERD (gastroesophageal reflux disease)    Hypothyroidism, adult    HTN (hypertension)    Degenerative disc disease, cervical    Degenerative disc disease, lumbar    OA (osteoarthritis)    Sarcoma of omentum    Afib    GERD (gastroesophageal reflux disease)    Hypothyroidism    Sarcoma of omentum    S/P tonsillectomy    S/P oophorectomy    Abdominal mass  Sarcoma - 10/2015  Exp lap, Excision of abdominal mass - 10/2015    After cataract not obscuring vision, bilateral  b/l lens implants    Abdominal wall mass  Exp La, Excison of abdominal mass - 8/2015    History of total hip replacement, right    H/O total knee replacement, right          Medication list         MEDICATIONS  (STANDING):  acetaminophen     Tablet .. 650 milliGRAM(s) Oral every 8 hours  albuterol/ipratropium for Nebulization 3 milliLiter(s) Nebulizer every 6 hours  diltiazem    milliGRAM(s) Oral daily  ferrous    sulfate 325 milliGRAM(s) Oral daily  folic acid 1 milliGRAM(s) Oral daily  furosemide    Tablet 20 milliGRAM(s) Oral daily  levothyroxine 75 MICROGram(s) Oral daily  pantoprazole    Tablet 40 milliGRAM(s) Oral before breakfast  potassium chloride    Tablet ER 10 milliEquivalent(s) Oral daily  QUEtiapine 50 milliGRAM(s) Oral at bedtime  QUEtiapine 12.5 milliGRAM(s) Oral <User Schedule>  sertraline 150 milliGRAM(s) Oral daily    MEDICATIONS  (PRN):  acetaminophen     Tablet .. 650 milliGRAM(s) Oral every 6 hours PRN Temp greater or equal to 38C (100.4F), Mild Pain (1 - 3)  ondansetron Injectable 4 milliGRAM(s) IV Push every 6 hours PRN Nausea         Vitals log        ICU Vital Signs Last 24 Hrs  T(C): 37.1 (13 Feb 2024 20:49), Max: 37.1 (13 Feb 2024 20:49)  T(F): 98.7 (13 Feb 2024 20:49), Max: 98.7 (13 Feb 2024 20:49)  HR: 73 (14 Feb 2024 01:41) (73 - 89)  BP: 134/68 (13 Feb 2024 20:49) (119/67 - 134/68)  BP(mean): --  ABP: --  ABP(mean): --  RR: 18 (13 Feb 2024 20:49) (18 - 18)  SpO2: 96% (14 Feb 2024 01:41) (93% - 98%)    O2 Parameters below as of 14 Feb 2024 01:41  Patient On (Oxygen Delivery Method): nasal cannula, 3 L                 Input and Output:  I&O's Detail    12 Feb 2024 07:01  -  13 Feb 2024 07:00  --------------------------------------------------------  IN:  Total IN: 0 mL    OUT:    Voided (mL): 600 mL  Total OUT: 600 mL    Total NET: -600 mL          Lab Data                        14.1   9.60  )-----------( 196      ( 13 Feb 2024 08:45 )             43.4     02-13    142  |  110<H>  |  15  ----------------------------<  105<H>  3.7   |  28  |  0.88    Ca    8.1<L>      13 Feb 2024 08:45              Review of Systems	      Objective     Physical Examination    heart s1s2  lung dc BS  head nc      Pertinent Lab findings & Imaging      Angie:  NO   Adequate UO     I&O's Detail    12 Feb 2024 07:01  -  13 Feb 2024 07:00  --------------------------------------------------------  IN:  Total IN: 0 mL    OUT:    Voided (mL): 600 mL  Total OUT: 600 mL    Total NET: -600 mL               Discussed with:     Cultures:	        Radiology                            
[facility], Division of Infectious Diseases  STEFFANIE Spivey Y. Patel, S. [facility], [facility]  [phone]    Name: TYRON FORTUNE  Age: 89y  Gender: Female  MRN: [id number]    Interval History:  Patient seen and examined at bedside   No acute overnight events. Afebrile  No complaints  Notes reviewed    Antibiotics:      Medications:  acetaminophen     Tablet .. 650 milliGRAM(s) Oral every 8 hours  acetaminophen     Tablet .. 650 milliGRAM(s) Oral every 6 hours PRN  albuterol/ipratropium for Nebulization 3 milliLiter(s) Nebulizer every 6 hours  diltiazem    milliGRAM(s) Oral daily  ferrous    sulfate 325 milliGRAM(s) Oral daily  folic acid 1 milliGRAM(s) Oral daily  furosemide    Tablet 20 milliGRAM(s) Oral daily  levothyroxine 75 MICROGram(s) Oral daily  ondansetron Injectable 4 milliGRAM(s) IV Push every 6 hours PRN  pantoprazole    Tablet 40 milliGRAM(s) Oral before breakfast  potassium chloride    Tablet ER 10 milliEquivalent(s) Oral daily  QUEtiapine 50 milliGRAM(s) Oral at bedtime  QUEtiapine 12.5 milliGRAM(s) Oral <User Schedule>  sertraline 150 milliGRAM(s) Oral daily      Review of Systems:  unable to obtain    Allergies: Keflex (Other (Mild))    For details regarding the patient's past medical history, social history, family history, and other miscellaneous elements, please refer the initial infectious diseases consultation and/or the admitting history and physical examination for this admission.    Objective:  Vitals:   T(C): 36.7 (02-14-24 @ 05:20), Max: 37.1 (02-13-24 @ 20:49)  HR: 74 (02-14-24 @ 05:20) (73 - 89)  BP: 143/82 (02-14-24 @ 05:20) (119/67 - 143/82)  RR: 18 (02-14-24 @ 09:13) (18 - 18)  SpO2: 92% (02-14-24 @ 09:13) (92% - 98%)    Physical Examination:  General: no acute distress  HEENT: NC/AT, EOMI  Cardio: RRR  Resp: symmetric chest rise  Abd: soft, NT, ND  Ext: no edema or cyanosis  Skin: warm, dry, no visible rash      Laboratory Studies:  CBC:                       13.2   9.02  )-----------( 194      ( 14 Feb 2024 08:00 )             39.4     CMP: 02-14    138  |  108  |  13  ----------------------------<  105<H>  3.7   |  27  |  0.81    Ca    7.6<L>      14 Feb 2024 08:00        Urinalysis Basic - ( 14 Feb 2024 08:00 )    Color: x / Appearance: x / SG: x / pH: x  Gluc: 105 mg/dL / Ketone: x  / Bili: x / Urobili: x   Blood: x / Protein: x / Nitrite: x   Leuk Esterase: x / RBC: x / WBC x   Sq Epi: x / Non Sq Epi: x / Bacteria: x        Microbiology: reviewed    Culture - Urine (collected 02-08-24 @ 17:05)  Source: Clean Catch Clean Catch (Midstream)  Final Report (02-09-24 @ 22:29):    No growth    Culture - Blood (collected 02-08-24 @ 15:10)  Source: .Blood Blood-Peripheral  Final Report (02-13-24 @ 23:00):    No growth at 5 days    Culture - Blood (collected 02-08-24 @ 14:55)  Source: .Blood Blood-Peripheral  Final Report (02-13-24 @ 23:00):    No growth at 5 days          Radiology: reviewed      
Patient is a 89y old  Female who presents with a chief complaint of acute hypoxic respiratory failure 2/2 RSV (10 Feb 2024 08:30)      INTERVAL HPI/OVERNIGHT EVENTS: noted  pt seen and examined this am   events noted  remains hypoxic, some cough      Vital Signs Last 24 Hrs  T(C): 37.1 (10 Feb 2024 20:12), Max: 37.1 (10 Feb 2024 20:12)  T(F): 98.8 (10 Feb 2024 20:12), Max: 98.8 (10 Feb 2024 20:12)  HR: 70 (10 Feb 2024 20:12) (68 - 78)  BP: 113/78 (10 Feb 2024 20:12) (100/59 - 160/88)  BP(mean): --  RR: 18 (10 Feb 2024 20:12) (18 - 18)  SpO2: 98% (10 Feb 2024 20:12) (96% - 98%)    Parameters below as of 10 Feb 2024 20:12  Patient On (Oxygen Delivery Method): nasal cannula  O2 Flow (L/min): 3      acetaminophen     Tablet .. 650 milliGRAM(s) Oral every 6 hours PRN  albuterol/ipratropium for Nebulization 3 milliLiter(s) Nebulizer every 6 hours  azithromycin  IVPB 500 milliGRAM(s) IV Intermittent every 24 hours  cefTRIAXone   IVPB 1000 milliGRAM(s) IV Intermittent every 24 hours  diltiazem    milliGRAM(s) Oral daily  ferrous    sulfate 325 milliGRAM(s) Oral daily  folic acid 1 milliGRAM(s) Oral daily  furosemide    Tablet 20 milliGRAM(s) Oral daily  levothyroxine 75 MICROGram(s) Oral daily  ondansetron Injectable 4 milliGRAM(s) IV Push every 6 hours PRN  pantoprazole    Tablet 40 milliGRAM(s) Oral before breakfast  potassium chloride    Tablet ER 10 milliEquivalent(s) Oral daily  QUEtiapine 50 milliGRAM(s) Oral at bedtime  QUEtiapine 12.5 milliGRAM(s) Oral <User Schedule>  sertraline 150 milliGRAM(s) Oral daily      PHYSICAL EXAM:  GENERAL: NAD,   EYES: conjunctiva and sclera clear  ENMT: Moist mucous membranes  NECK: Supple, No JVD, Normal thyroid  CHEST/LUNG: non labored, cta b/l  HEART: Regular rate and rhythm; No murmurs, rubs, or gallops  ABDOMEN: Soft, Nontender, Nondistended; Bowel sounds present  EXTREMITIES:  2+ Peripheral Pulses, No clubbing, cyanosis, or edema  LYMPH: No lymphadenopathy noted  SKIN: No rashes or lesions    Consultant(s) Notes Reviewed:  [x ] YES  [ ] NO  Care Discussed with Consultants/Other Providers [ x] YES  [ ] NO    LABS:                        13.8   7.67  )-----------( 179      ( 10 Feb 2024 08:50 )             41.3     02-10    145  |  112<H>  |  24<H>  ----------------------------<  100<H>  3.4<L>   |  28  |  1.00    Ca    8.5      10 Feb 2024 08:50    TPro  7.1  /  Alb  3.3  /  TBili  0.4  /  DBili  x   /  AST  16  /  ALT  19  /  AlkPhos  72  02-09      Urinalysis Basic - ( 10 Feb 2024 08:50 )    Color: x / Appearance: x / SG: x / pH: x  Gluc: 100 mg/dL / Ketone: x  / Bili: x / Urobili: x   Blood: x / Protein: x / Nitrite: x   Leuk Esterase: x / RBC: x / WBC x   Sq Epi: x / Non Sq Epi: x / Bacteria: x      CAPILLARY BLOOD GLUCOSE            Urinalysis Basic - ( 10 Feb 2024 08:50 )    Color: x / Appearance: x / SG: x / pH: x  Gluc: 100 mg/dL / Ketone: x  / Bili: x / Urobili: x   Blood: x / Protein: x / Nitrite: x   Leuk Esterase: x / RBC: x / WBC x   Sq Epi: x / Non Sq Epi: x / Bacteria: x        Culture - Urine (collected 08 Feb 2024 17:05)  Source: Clean Catch Clean Catch (Midstream)  Final Report (09 Feb 2024 22:29):    No growth    Culture - Blood (collected 08 Feb 2024 15:10)  Source: .Blood Blood-Peripheral  Preliminary Report (09 Feb 2024 23:02):    No growth at 24 hours    Culture - Blood (collected 08 Feb 2024 14:55)  Source: .Blood Blood-Peripheral  Preliminary Report (09 Feb 2024 23:02):    No growth at 24 hours        RADIOLOGY & ADDITIONAL TESTS:    Imaging Personally Reviewed:  [x ] YES  [ ] NO
Chief Complaint: Cough, wheezing    Interval Events: No events overnight.    Review of Systems:  General: No fevers, chills, weight gain  Skin: No rashes, color changes  Cardiovascular: No chest pain, orthopnea  Respiratory: No shortness of breath, cough  Gastrointestinal: No nausea, abdominal pain  Genitourinary: No incontinence, pain with urination  Musculoskeletal: No pain, swelling, decreased range of motion  Neurological: No headache, weakness  Psychiatric: No depression, anxiety  Endocrine: No weight gain, increased thirst  All other systems are comprehensively negative.    Physical Exam:  Vital Signs Last 24 Hrs  T(C): 36.8 (12 Feb 2024 05:03), Max: 36.8 (12 Feb 2024 05:03)  T(F): 98.3 (12 Feb 2024 05:03), Max: 98.3 (12 Feb 2024 05:03)  HR: 67 (12 Feb 2024 05:03) (67 - 81)  BP: 161/80 (12 Feb 2024 05:03) (96/65 - 161/80)  BP(mean): --  RR: 18 (12 Feb 2024 05:03) (18 - 18)  SpO2: 99% (12 Feb 2024 05:03) (95% - 99%)  Parameters below as of 12 Feb 2024 05:03  Patient On (Oxygen Delivery Method): nasal cannula  O2 Flow (L/min): 3  General: NAD  HEENT: MMM  Neck: No JVD, no carotid bruit  Lungs: CTAB  CV: RRR, nl S1/S2, no M/R/G  Abdomen: S/NT/ND, +BS  Extremities: No LE edema, no cyanosis  Neuro: AAOx3, non-focal  Skin: No rash    Labs:             02-11    143  |  108  |  19  ----------------------------<  91  3.8   |  29  |  0.98    Ca    8.4<L>      11 Feb 2024 09:00                          13.8   8.94  )-----------( 192      ( 12 Feb 2024 08:36 )             42.2       ECG/Telemetry: Sinus rhythm
Date/Time Patient Seen:  		  Referring MD:   Data Reviewed	       Patient is a 89y old  Female who presents with a chief complaint of acute hypoxic respiratory failure 2/2 RSV (10 Feb 2024 22:26)      Subjective/HPI     PAST MEDICAL & SURGICAL HISTORY:  Atrial fibrillation    GERD (gastroesophageal reflux disease)    Hypothyroidism, adult    HTN (hypertension)    Degenerative disc disease, cervical    Degenerative disc disease, lumbar    OA (osteoarthritis)    Sarcoma of omentum    Afib    GERD (gastroesophageal reflux disease)    Hypothyroidism    Sarcoma of omentum    S/P tonsillectomy    S/P oophorectomy    Abdominal mass  Sarcoma - 10/2015  Exp lap, Excision of abdominal mass - 10/2015    After cataract not obscuring vision, bilateral  b/l lens implants    Abdominal wall mass  Exp La, Excison of abdominal mass - 8/2015    History of total hip replacement, right    H/O total knee replacement, right          Medication list         MEDICATIONS  (STANDING):  albuterol/ipratropium for Nebulization 3 milliLiter(s) Nebulizer every 6 hours  azithromycin  IVPB 500 milliGRAM(s) IV Intermittent every 24 hours  cefTRIAXone   IVPB 1000 milliGRAM(s) IV Intermittent every 24 hours  diltiazem    milliGRAM(s) Oral daily  ferrous    sulfate 325 milliGRAM(s) Oral daily  folic acid 1 milliGRAM(s) Oral daily  furosemide    Tablet 20 milliGRAM(s) Oral daily  levothyroxine 75 MICROGram(s) Oral daily  pantoprazole    Tablet 40 milliGRAM(s) Oral before breakfast  potassium chloride    Tablet ER 10 milliEquivalent(s) Oral daily  QUEtiapine 12.5 milliGRAM(s) Oral <User Schedule>  QUEtiapine 50 milliGRAM(s) Oral at bedtime  sertraline 150 milliGRAM(s) Oral daily    MEDICATIONS  (PRN):  acetaminophen     Tablet .. 650 milliGRAM(s) Oral every 6 hours PRN Temp greater or equal to 38C (100.4F), Mild Pain (1 - 3)  ondansetron Injectable 4 milliGRAM(s) IV Push every 6 hours PRN Nausea         Vitals log        ICU Vital Signs Last 24 Hrs  T(C): 36.8 (11 Feb 2024 05:13), Max: 37.1 (10 Feb 2024 20:12)  T(F): 98.2 (11 Feb 2024 05:13), Max: 98.8 (10 Feb 2024 20:12)  HR: 73 (11 Feb 2024 08:01) (70 - 77)  BP: 155/83 (11 Feb 2024 05:13) (100/59 - 155/83)  BP(mean): --  ABP: --  ABP(mean): --  RR: 17 (11 Feb 2024 05:13) (17 - 18)  SpO2: 94% (11 Feb 2024 08:01) (94% - 98%)    O2 Parameters below as of 11 Feb 2024 08:01  Patient On (Oxygen Delivery Method): nasal cannula                 Input and Output:  I&O's Detail    10 Feb 2024 07:01  -  11 Feb 2024 07:00  --------------------------------------------------------  IN:  Total IN: 0 mL    OUT:    Voided (mL): 850 mL  Total OUT: 850 mL    Total NET: -850 mL          Lab Data                        13.8   7.67  )-----------( 179      ( 10 Feb 2024 08:50 )             41.3     02-10    145  |  112<H>  |  24<H>  ----------------------------<  100<H>  3.4<L>   |  28  |  1.00    Ca    8.5      10 Feb 2024 08:50    TPro  7.1  /  Alb  3.3  /  TBili  0.4  /  DBili  x   /  AST  16  /  ALT  19  /  AlkPhos  72  02-09            Review of Systems	      Objective     Physical Examination    heart s1s2  lung dc BS  head nc      Pertinent Lab findings & Imaging      Angie:  NO   Adequate UO     I&O's Detail    10 Feb 2024 07:01  -  11 Feb 2024 07:00  --------------------------------------------------------  IN:  Total IN: 0 mL    OUT:    Voided (mL): 850 mL  Total OUT: 850 mL    Total NET: -850 mL               Discussed with:     Cultures:	        Radiology                            
Patient is a 89y old  Female who presents with a chief complaint of acute hypoxic respiratory failure 2/2 RSV (12 Feb 2024 12:25)      INTERVAL HPI/OVERNIGHT EVENTS: noted  pt seen and examined this am   events noted  sating well on RA      Vital Signs Last 24 Hrs  T(C): 36.8 (12 Feb 2024 12:22), Max: 36.8 (12 Feb 2024 05:03)  T(F): 98.3 (12 Feb 2024 12:22), Max: 98.3 (12 Feb 2024 05:03)  HR: 76 (12 Feb 2024 12:22) (67 - 82)  BP: 123/75 (12 Feb 2024 12:22) (123/75 - 161/80)  BP(mean): --  RR: 18 (12 Feb 2024 12:22) (18 - 18)  SpO2: 95% (12 Feb 2024 14:21) (93% - 99%)    Parameters below as of 12 Feb 2024 14:21  Patient On (Oxygen Delivery Method): room air        acetaminophen     Tablet .. 650 milliGRAM(s) Oral every 6 hours PRN  albuterol/ipratropium for Nebulization 3 milliLiter(s) Nebulizer every 6 hours  diltiazem    milliGRAM(s) Oral daily  ferrous    sulfate 325 milliGRAM(s) Oral daily  folic acid 1 milliGRAM(s) Oral daily  furosemide    Tablet 20 milliGRAM(s) Oral daily  levothyroxine 75 MICROGram(s) Oral daily  ondansetron Injectable 4 milliGRAM(s) IV Push every 6 hours PRN  pantoprazole    Tablet 40 milliGRAM(s) Oral before breakfast  potassium chloride    Tablet ER 10 milliEquivalent(s) Oral daily  QUEtiapine 50 milliGRAM(s) Oral at bedtime  QUEtiapine 12.5 milliGRAM(s) Oral <User Schedule>  sertraline 150 milliGRAM(s) Oral daily      PHYSICAL EXAM:  GENERAL: NAD,   EYES: conjunctiva and sclera clear  ENMT: Moist mucous membranes  NECK: Supple, No JVD, Normal thyroid  CHEST/LUNG: non labored, cta b/l  HEART: Regular rate and rhythm; No murmurs, rubs, or gallops  ABDOMEN: Soft, Nontender, Nondistended; Bowel sounds present  EXTREMITIES:  2+ Peripheral Pulses, No clubbing, cyanosis, or edema  LYMPH: No lymphadenopathy noted  SKIN: No rashes or lesions    Consultant(s) Notes Reviewed:  [x ] YES  [ ] NO  Care Discussed with Consultants/Other Providers [ x] YES  [ ] NO    LABS:                        13.8   8.94  )-----------( 192      ( 12 Feb 2024 08:36 )             42.2     02-12    142  |  108  |  14  ----------------------------<  99  3.5   |  29  |  0.81    Ca    8.1<L>      12 Feb 2024 08:36        Urinalysis Basic - ( 12 Feb 2024 08:36 )    Color: x / Appearance: x / SG: x / pH: x  Gluc: 99 mg/dL / Ketone: x  / Bili: x / Urobili: x   Blood: x / Protein: x / Nitrite: x   Leuk Esterase: x / RBC: x / WBC x   Sq Epi: x / Non Sq Epi: x / Bacteria: x      CAPILLARY BLOOD GLUCOSE            Urinalysis Basic - ( 12 Feb 2024 08:36 )    Color: x / Appearance: x / SG: x / pH: x  Gluc: 99 mg/dL / Ketone: x  / Bili: x / Urobili: x   Blood: x / Protein: x / Nitrite: x   Leuk Esterase: x / RBC: x / WBC x   Sq Epi: x / Non Sq Epi: x / Bacteria: x          RADIOLOGY & ADDITIONAL TESTS:    Imaging Personally Reviewed:  [x ] YES  [ ] NO
Chief Complaint: Cough, wheezing    Interval Events: No events overnight.    Review of Systems:  General: No fevers, chills, weight gain  Skin: No rashes, color changes  Cardiovascular: No chest pain, orthopnea  Respiratory: No shortness of breath, cough  Gastrointestinal: No nausea, abdominal pain  Genitourinary: No incontinence, pain with urination  Musculoskeletal: No pain, swelling, decreased range of motion  Neurological: No headache, weakness  Psychiatric: No depression, anxiety  Endocrine: No weight gain, increased thirst  All other systems are comprehensively negative.    Physical Exam:  Vital Signs Last 24 Hrs  T(C): 36.7 (14 Feb 2024 05:20), Max: 37.1 (13 Feb 2024 20:49)  T(F): 98 (14 Feb 2024 05:20), Max: 98.7 (13 Feb 2024 20:49)  HR: 74 (14 Feb 2024 05:20) (73 - 89)  BP: 143/82 (14 Feb 2024 05:20) (119/67 - 143/82)  BP(mean): --  RR: 18 (14 Feb 2024 09:13) (18 - 18)  SpO2: 92% (14 Feb 2024 09:13) (92% - 98%)  Parameters below as of 14 Feb 2024 09:13  Patient On (Oxygen Delivery Method): nasal cannula  O2 Flow (L/min): 2  General: NAD  HEENT: MMM  Neck: No JVD, no carotid bruit  Lungs: CTAB  CV: RRR, nl S1/S2, no M/R/G  Abdomen: S/NT/ND, +BS  Extremities: No LE edema, no cyanosis  Neuro: AAOx3, non-focal  Skin: No rash    Labs:             02-14    138  |  108  |  13  ----------------------------<  105<H>  3.7   |  27  |  0.81    Ca    7.6<L>      14 Feb 2024 08:00                          13.2   9.02  )-----------( 194      ( 14 Feb 2024 08:00 )             39.4       ECG/Telemetry: Sinus rhythm
Chief Complaint: Cough, wheezing    Interval Events: No events overnight.    Review of Systems:  General: No fevers, chills, weight gain  Skin: No rashes, color changes  Cardiovascular: No chest pain, orthopnea  Respiratory: No shortness of breath, cough  Gastrointestinal: No nausea, abdominal pain  Genitourinary: No incontinence, pain with urination  Musculoskeletal: No pain, swelling, decreased range of motion  Neurological: No headache, weakness  Psychiatric: No depression, anxiety  Endocrine: No weight gain, increased thirst  All other systems are comprehensively negative.    Physical Exam:  Vital Signs Last 24 Hrs  T(C): 36.8 (13 Feb 2024 05:20), Max: 36.8 (12 Feb 2024 12:22)  T(F): 98.2 (13 Feb 2024 05:20), Max: 98.3 (12 Feb 2024 12:22)  HR: 80 (13 Feb 2024 05:20) (76 - 98)  BP: 155/79 (13 Feb 2024 05:20) (115/70 - 155/79)  BP(mean): --  RR: 18 (13 Feb 2024 05:20) (18 - 18)  SpO2: 94% (13 Feb 2024 05:20) (93% - 98%)  Parameters below as of 13 Feb 2024 05:20  Patient On (Oxygen Delivery Method): nasal cannula  O2 Flow (L/min): 3  General: NAD  HEENT: MMM  Neck: No JVD, no carotid bruit  Lungs: CTAB  CV: RRR, nl S1/S2, no M/R/G  Abdomen: S/NT/ND, +BS  Extremities: No LE edema, no cyanosis  Neuro: AAOx3, non-focal  Skin: No rash    Labs:             02-12    142  |  108  |  14  ----------------------------<  99  3.5   |  29  |  0.81    Ca    8.1<L>      12 Feb 2024 08:36                          13.8   8.94  )-----------( 192      ( 12 Feb 2024 08:36 )             42.2       ECG/Telemetry: Sinus rhythm
Chief Complaint: Cough, wheezing    Interval Events: No events overnight.    Review of Systems:  General: No fevers, chills, weight gain  Skin: No rashes, color changes  Cardiovascular: No chest pain, orthopnea  Respiratory: No shortness of breath, cough  Gastrointestinal: No nausea, abdominal pain  Genitourinary: No incontinence, pain with urination  Musculoskeletal: No pain, swelling, decreased range of motion  Neurological: No headache, weakness  Psychiatric: No depression, anxiety  Endocrine: No weight gain, increased thirst  All other systems are comprehensively negative.    Physical Exam:  Vitals:        Vital Signs Last 24 Hrs  T(C): 36.8 (11 Feb 2024 05:13), Max: 37.1 (10 Feb 2024 20:12)  T(F): 98.2 (11 Feb 2024 05:13), Max: 98.8 (10 Feb 2024 20:12)  HR: 73 (11 Feb 2024 08:01) (70 - 77)  BP: 155/83 (11 Feb 2024 05:13) (100/59 - 155/83)  BP(mean): --  RR: 17 (11 Feb 2024 05:13) (17 - 18)  SpO2: 94% (11 Feb 2024 08:01) (94% - 98%)    Parameters below as of 11 Feb 2024 08:01  Patient On (Oxygen Delivery Method): nasal cannula      General: NAD  HEENT: MMM  Neck: No JVD, no carotid bruit  Lungs: CTAB  CV: RRR, nl S1/S2, no M/R/G  Abdomen: S/NT/ND, +BS  Extremities: No LE edema, no cyanosis  Neuro: AAOx3, non-focal  Skin: No rash    Labs:                        13.8   7.67  )-----------( 179      ( 10 Feb 2024 08:50 )             41.3     02-10    145  |  112<H>  |  24<H>  ----------------------------<  100<H>  3.4<L>   |  28  |  1.00    Ca    8.5      10 Feb 2024 08:50              ECG/Telemetry: Sinus rhythm
Date/Time Patient Seen:  		  Referring MD:   Data Reviewed	       Patient is a 89y old  Female who presents with a chief complaint of acute hypoxic respiratory failure 2/2 RSV (09 Feb 2024 16:41)      Subjective/HPI     PAST MEDICAL & SURGICAL HISTORY:  Atrial fibrillation    GERD (gastroesophageal reflux disease)    Hypothyroidism, adult    HTN (hypertension)    Degenerative disc disease, cervical    Degenerative disc disease, lumbar    OA (osteoarthritis)    Sarcoma of omentum    Afib    GERD (gastroesophageal reflux disease)    Hypothyroidism    Sarcoma of omentum    S/P tonsillectomy    S/P oophorectomy    Abdominal mass  Sarcoma - 10/2015  Exp lap, Excision of abdominal mass - 10/2015    After cataract not obscuring vision, bilateral  b/l lens implants    Abdominal wall mass  Exp La, Excison of abdominal mass - 8/2015    History of total hip replacement, right    H/O total knee replacement, right          Medication list         MEDICATIONS  (STANDING):  albuterol/ipratropium for Nebulization 3 milliLiter(s) Nebulizer every 6 hours  azithromycin  IVPB 500 milliGRAM(s) IV Intermittent every 24 hours  cefTRIAXone   IVPB 1000 milliGRAM(s) IV Intermittent every 24 hours  diltiazem    milliGRAM(s) Oral daily  ferrous    sulfate 325 milliGRAM(s) Oral daily  folic acid 1 milliGRAM(s) Oral daily  furosemide    Tablet 20 milliGRAM(s) Oral daily  levothyroxine 75 MICROGram(s) Oral daily  pantoprazole    Tablet 40 milliGRAM(s) Oral before breakfast  potassium chloride    Tablet ER 10 milliEquivalent(s) Oral daily  QUEtiapine 50 milliGRAM(s) Oral at bedtime  QUEtiapine 12.5 milliGRAM(s) Oral <User Schedule>  sertraline 150 milliGRAM(s) Oral daily    MEDICATIONS  (PRN):  acetaminophen     Tablet .. 650 milliGRAM(s) Oral every 6 hours PRN Temp greater or equal to 38C (100.4F), Mild Pain (1 - 3)  ondansetron Injectable 4 milliGRAM(s) IV Push every 6 hours PRN Nausea         Vitals log        ICU Vital Signs Last 24 Hrs  T(C): 36.4 (10 Feb 2024 04:51), Max: 36.6 (09 Feb 2024 11:49)  T(F): 97.5 (10 Feb 2024 04:51), Max: 97.8 (09 Feb 2024 11:49)  HR: 71 (10 Feb 2024 08:00) (63 - 78)  BP: 160/88 (10 Feb 2024 04:51) (120/67 - 160/88)  BP(mean): --  ABP: --  ABP(mean): --  RR: 18 (10 Feb 2024 04:51) (18 - 18)  SpO2: 98% (10 Feb 2024 08:00) (93% - 98%)    O2 Parameters below as of 10 Feb 2024 08:00  Patient On (Oxygen Delivery Method): nasal cannula, pt on 3lpm nc                 Input and Output:  I&O's Detail    09 Feb 2024 07:01  -  10 Feb 2024 07:00  --------------------------------------------------------  IN:  Total IN: 0 mL    OUT:    Voided (mL): 500 mL  Total OUT: 500 mL    Total NET: -500 mL          Lab Data                        12.8   3.32  )-----------( 154      ( 09 Feb 2024 08:36 )             38.1     02-09    139  |  108  |  22  ----------------------------<  165<H>  3.8   |  23  |  0.84    Ca    8.0<L>      09 Feb 2024 08:36  Mg     2.2     02-08    TPro  7.1  /  Alb  3.3  /  TBili  0.4  /  DBili  x   /  AST  16  /  ALT  19  /  AlkPhos  72  02-09            Review of Systems	      Objective     Physical Examination    heart s1s2  lung dc BS  head nc      Pertinent Lab findings & Imaging      Angie:  NO   Adequate UO     I&O's Detail    09 Feb 2024 07:01  -  10 Feb 2024 07:00  --------------------------------------------------------  IN:  Total IN: 0 mL    OUT:    Voided (mL): 500 mL  Total OUT: 500 mL    Total NET: -500 mL               Discussed with:     Cultures:	        Radiology                            
Patient is a 89y old  Female who presents with a chief complaint of acute hypoxic respiratory failure 2/2 RSV (09 Feb 2024 07:19)      INTERVAL HPI/OVERNIGHT EVENTS: noted  pt seen and examined this am   events noted  oob to chair, NAD      Vital Signs Last 24 Hrs  T(C): 36.6 (09 Feb 2024 11:49), Max: 36.6 (09 Feb 2024 00:30)  T(F): 97.8 (09 Feb 2024 11:49), Max: 97.8 (09 Feb 2024 00:30)  HR: 71 (09 Feb 2024 14:21) (63 - 99)  BP: 120/67 (09 Feb 2024 11:49) (120/67 - 160/80)  BP(mean): --  RR: 18 (09 Feb 2024 11:49) (18 - 20)  SpO2: 93% (09 Feb 2024 14:21) (93% - 96%)    Parameters below as of 09 Feb 2024 14:21  Patient On (Oxygen Delivery Method): nasal cannula, 3LPM        acetaminophen     Tablet .. 650 milliGRAM(s) Oral every 6 hours PRN  albuterol/ipratropium for Nebulization 3 milliLiter(s) Nebulizer every 6 hours  azithromycin  IVPB 500 milliGRAM(s) IV Intermittent every 24 hours  cefTRIAXone   IVPB 1000 milliGRAM(s) IV Intermittent every 24 hours  diltiazem    milliGRAM(s) Oral daily  ferrous    sulfate 325 milliGRAM(s) Oral daily  folic acid 1 milliGRAM(s) Oral daily  furosemide    Tablet 20 milliGRAM(s) Oral daily  levothyroxine 75 MICROGram(s) Oral daily  ondansetron Injectable 4 milliGRAM(s) IV Push every 6 hours PRN  pantoprazole    Tablet 40 milliGRAM(s) Oral before breakfast  potassium chloride    Tablet ER 10 milliEquivalent(s) Oral daily  QUEtiapine 50 milliGRAM(s) Oral at bedtime  QUEtiapine 12.5 milliGRAM(s) Oral <User Schedule>  sertraline 150 milliGRAM(s) Oral daily      PHYSICAL EXAM:  GENERAL: NAD,   EYES: conjunctiva and sclera clear  ENMT: Moist mucous membranes  NECK: Supple, No JVD, Normal thyroid  CHEST/LUNG: non labored, cta b/l  HEART: Regular rate and rhythm; No murmurs, rubs, or gallops  ABDOMEN: Soft, Nontender, Nondistended; Bowel sounds present  EXTREMITIES:  2+ Peripheral Pulses, No clubbing, cyanosis, or edema  LYMPH: No lymphadenopathy noted  SKIN: No rashes or lesions    Consultant(s) Notes Reviewed:  [x ] YES  [ ] NO  Care Discussed with Consultants/Other Providers [ x] YES  [ ] NO    LABS:                        12.8   3.32  )-----------( 154      ( 09 Feb 2024 08:36 )             38.1     02-09    139  |  108  |  22  ----------------------------<  165<H>  3.8   |  23  |  0.84    Ca    8.0<L>      09 Feb 2024 08:36  Mg     2.2     02-08    TPro  7.1  /  Alb  3.3  /  TBili  0.4  /  DBili  x   /  AST  16  /  ALT  19  /  AlkPhos  72  02-09    PT/INR - ( 08 Feb 2024 18:50 )   PT: 11.8 sec;   INR: 1.01 ratio         PTT - ( 08 Feb 2024 18:50 )  PTT:28.6 sec  Urinalysis Basic - ( 09 Feb 2024 08:36 )    Color: x / Appearance: x / SG: x / pH: x  Gluc: 165 mg/dL / Ketone: x  / Bili: x / Urobili: x   Blood: x / Protein: x / Nitrite: x   Leuk Esterase: x / RBC: x / WBC x   Sq Epi: x / Non Sq Epi: x / Bacteria: x      CAPILLARY BLOOD GLUCOSE            Urinalysis Basic - ( 09 Feb 2024 08:36 )    Color: x / Appearance: x / SG: x / pH: x  Gluc: 165 mg/dL / Ketone: x  / Bili: x / Urobili: x   Blood: x / Protein: x / Nitrite: x   Leuk Esterase: x / RBC: x / WBC x   Sq Epi: x / Non Sq Epi: x / Bacteria: x          RADIOLOGY & ADDITIONAL TESTS:    Imaging Personally Reviewed:  [x ] YES  [ ] NO
Patient is a 89y old  Female who presents with a chief complaint of acute hypoxic respiratory failure 2/2 RSV (13 Feb 2024 10:41)      INTERVAL HPI/OVERNIGHT EVENTS: noted  pt seen and examined this am   events noted  feels well, requiring o2 on min exertion  c/o knee pain      Vital Signs Last 24 Hrs  T(C): 36.9 (13 Feb 2024 12:15), Max: 36.9 (13 Feb 2024 12:15)  T(F): 98.5 (13 Feb 2024 12:15), Max: 98.5 (13 Feb 2024 12:15)  HR: 85 (13 Feb 2024 12:15) (74 - 98)  BP: 119/67 (13 Feb 2024 12:15) (115/70 - 155/79)  BP(mean): --  RR: 18 (13 Feb 2024 12:15) (18 - 18)  SpO2: 95% (13 Feb 2024 14:21) (93% - 98%)    Parameters below as of 13 Feb 2024 14:21  Patient On (Oxygen Delivery Method): nasal cannula, 3 L        acetaminophen     Tablet .. 650 milliGRAM(s) Oral every 8 hours  acetaminophen     Tablet .. 650 milliGRAM(s) Oral every 6 hours PRN  albuterol/ipratropium for Nebulization 3 milliLiter(s) Nebulizer every 6 hours  diltiazem    milliGRAM(s) Oral daily  ferrous    sulfate 325 milliGRAM(s) Oral daily  folic acid 1 milliGRAM(s) Oral daily  furosemide    Tablet 20 milliGRAM(s) Oral daily  levothyroxine 75 MICROGram(s) Oral daily  ondansetron Injectable 4 milliGRAM(s) IV Push every 6 hours PRN  pantoprazole    Tablet 40 milliGRAM(s) Oral before breakfast  potassium chloride    Tablet ER 10 milliEquivalent(s) Oral daily  QUEtiapine 12.5 milliGRAM(s) Oral <User Schedule>  QUEtiapine 50 milliGRAM(s) Oral at bedtime  sertraline 150 milliGRAM(s) Oral daily      PHYSICAL EXAM:  GENERAL: NAD,   EYES: conjunctiva and sclera clear  ENMT: Moist mucous membranes  NECK: Supple, No JVD, Normal thyroid  CHEST/LUNG: non labored, cta b/l  HEART: Regular rate and rhythm; No murmurs, rubs, or gallops  ABDOMEN: Soft, Nontender, Nondistended; Bowel sounds present  EXTREMITIES:  2+ Peripheral Pulses, No clubbing, cyanosis, or edema  LYMPH: No lymphadenopathy noted  SKIN: No rashes or lesions    Consultant(s) Notes Reviewed:  [x ] YES  [ ] NO  Care Discussed with Consultants/Other Providers [ x] YES  [ ] NO    LABS:                        14.1   9.60  )-----------( 196      ( 13 Feb 2024 08:45 )             43.4     02-13    142  |  110<H>  |  15  ----------------------------<  105<H>  3.7   |  28  |  0.88    Ca    8.1<L>      13 Feb 2024 08:45        Urinalysis Basic - ( 13 Feb 2024 08:45 )    Color: x / Appearance: x / SG: x / pH: x  Gluc: 105 mg/dL / Ketone: x  / Bili: x / Urobili: x   Blood: x / Protein: x / Nitrite: x   Leuk Esterase: x / RBC: x / WBC x   Sq Epi: x / Non Sq Epi: x / Bacteria: x      CAPILLARY BLOOD GLUCOSE            Urinalysis Basic - ( 13 Feb 2024 08:45 )    Color: x / Appearance: x / SG: x / pH: x  Gluc: 105 mg/dL / Ketone: x  / Bili: x / Urobili: x   Blood: x / Protein: x / Nitrite: x   Leuk Esterase: x / RBC: x / WBC x   Sq Epi: x / Non Sq Epi: x / Bacteria: x          RADIOLOGY & ADDITIONAL TESTS:    Imaging Personally Reviewed:  [x ] YES  [ ] NO
[place], Division of Infectious Diseases  STEFFANIE Spivey Y. Patel, S. [facility], [facility]  [phone]    Name: TYRON FORTUNE  Age: 89y  Gender: Female  MRN: [id number]    Interval History:  Patient seen and examined at bedside  No acute overnight events. Afebrile  No complaints  Notes reviewed    Antibiotics:      Medications:  acetaminophen     Tablet .. 650 milliGRAM(s) Oral every 6 hours PRN  albuterol/ipratropium for Nebulization 3 milliLiter(s) Nebulizer every 6 hours  diltiazem    milliGRAM(s) Oral daily  ferrous    sulfate 325 milliGRAM(s) Oral daily  folic acid 1 milliGRAM(s) Oral daily  furosemide    Tablet 20 milliGRAM(s) Oral daily  levothyroxine 75 MICROGram(s) Oral daily  ondansetron Injectable 4 milliGRAM(s) IV Push every 6 hours PRN  pantoprazole    Tablet 40 milliGRAM(s) Oral before breakfast  potassium chloride    Tablet ER 10 milliEquivalent(s) Oral daily  QUEtiapine 50 milliGRAM(s) Oral at bedtime  QUEtiapine 12.5 milliGRAM(s) Oral <User Schedule>  sertraline 150 milliGRAM(s) Oral daily      Review of Systems:  Review of systems otherwise negative except as previously noted.    Allergies: Keflex (Other (Mild))    For details regarding the patient's past medical history, social history, family history, and other miscellaneous elements, please refer the initial infectious diseases consultation and/or the admitting history and physical examination for this admission.    Objective:  Vitals:   T(C): 36.8 (02-13-24 @ 05:20), Max: 36.8 (02-12-24 @ 12:22)  HR: 74 (02-13-24 @ 08:11) (74 - 98)  BP: 155/79 (02-13-24 @ 05:20) (115/70 - 155/79)  RR: 18 (02-13-24 @ 05:20) (18 - 18)  SpO2: 94% (02-13-24 @ 08:11) (93% - 98%)    Physical Examination:  General: no acute distress  HEENT: NC/AT, EOMI,   Cardio: S1, S2 heard, RRR, no murmurs  Resp: decreased breath sounds  Abd: soft, NT, ND,  Ext: no edema or cyanosis  Skin: warm, dry, no visible rash      Laboratory Studies:  CBC:                       14.1   9.60  )-----------( 196      ( 13 Feb 2024 08:45 )             43.4     CMP: 02-13    142  |  110<H>  |  15  ----------------------------<  105<H>  3.7   |  28  |  0.88    Ca    8.1<L>      13 Feb 2024 08:45        Urinalysis Basic - ( 13 Feb 2024 08:45 )    Color: x / Appearance: x / SG: x / pH: x  Gluc: 105 mg/dL / Ketone: x  / Bili: x / Urobili: x   Blood: x / Protein: x / Nitrite: x   Leuk Esterase: x / RBC: x / WBC x   Sq Epi: x / Non Sq Epi: x / Bacteria: x        Microbiology: reviewed    Culture - Urine (collected 02-08-24 @ 17:05)  Source: Clean Catch Clean Catch (Midstream)  Final Report (02-09-24 @ 22:29):    No growth    Culture - Blood (collected 02-08-24 @ 15:10)  Source: .Blood Blood-Peripheral  Preliminary Report (02-12-24 @ 23:01):    No growth at 4 days    Culture - Blood (collected 02-08-24 @ 14:55)  Source: .Blood Blood-Peripheral  Preliminary Report (02-12-24 @ 23:01):    No growth at 4 days          Radiology: reviewed      
Patient is a 89y old  Female who presents with a chief complaint of acute hypoxic respiratory failure 2/2 RSV (11 Feb 2024 08:34)      INTERVAL HPI/OVERNIGHT EVENTS: noted  pt seen and examined this am   events noted  feels well      Vital Signs Last 24 Hrs  T(C): 36.7 (11 Feb 2024 12:41), Max: 37.1 (10 Feb 2024 20:12)  T(F): 98 (11 Feb 2024 12:41), Max: 98.8 (10 Feb 2024 20:12)  HR: 73 (11 Feb 2024 12:41) (70 - 75)  BP: 96/65 (11 Feb 2024 12:41) (96/65 - 155/83)  BP(mean): --  RR: 18 (11 Feb 2024 12:41) (17 - 18)  SpO2: 95% (11 Feb 2024 12:41) (94% - 98%)    Parameters below as of 11 Feb 2024 08:01  Patient On (Oxygen Delivery Method): nasal cannula        acetaminophen     Tablet .. 650 milliGRAM(s) Oral every 6 hours PRN  albuterol/ipratropium for Nebulization 3 milliLiter(s) Nebulizer every 6 hours  azithromycin  IVPB 500 milliGRAM(s) IV Intermittent every 24 hours  cefTRIAXone   IVPB 1000 milliGRAM(s) IV Intermittent every 24 hours  diltiazem    milliGRAM(s) Oral daily  ferrous    sulfate 325 milliGRAM(s) Oral daily  folic acid 1 milliGRAM(s) Oral daily  furosemide    Tablet 20 milliGRAM(s) Oral daily  levothyroxine 75 MICROGram(s) Oral daily  ondansetron Injectable 4 milliGRAM(s) IV Push every 6 hours PRN  pantoprazole    Tablet 40 milliGRAM(s) Oral before breakfast  potassium chloride    Tablet ER 10 milliEquivalent(s) Oral daily  QUEtiapine 50 milliGRAM(s) Oral at bedtime  QUEtiapine 12.5 milliGRAM(s) Oral <User Schedule>  sertraline 150 milliGRAM(s) Oral daily      PHYSICAL EXAM:  GENERAL: NAD,   EYES: conjunctiva and sclera clear  ENMT: Moist mucous membranes  NECK: Supple, No JVD, Normal thyroid  CHEST/LUNG: non labored, cta b/l  HEART: Regular rate and rhythm; No murmurs, rubs, or gallops  ABDOMEN: Soft, Nontender, Nondistended; Bowel sounds present  EXTREMITIES:  2+ Peripheral Pulses, No clubbing, cyanosis, or edema  LYMPH: No lymphadenopathy noted  SKIN: No rashes or lesions    Consultant(s) Notes Reviewed:  [x ] YES  [ ] NO  Care Discussed with Consultants/Other Providers [ x] YES  [ ] NO    LABS:                        14.0   9.33  )-----------( 187      ( 11 Feb 2024 09:00 )             43.3     02-11    143  |  108  |  19  ----------------------------<  91  3.8   |  29  |  0.98    Ca    8.4<L>      11 Feb 2024 09:00        Urinalysis Basic - ( 11 Feb 2024 09:00 )    Color: x / Appearance: x / SG: x / pH: x  Gluc: 91 mg/dL / Ketone: x  / Bili: x / Urobili: x   Blood: x / Protein: x / Nitrite: x   Leuk Esterase: x / RBC: x / WBC x   Sq Epi: x / Non Sq Epi: x / Bacteria: x      CAPILLARY BLOOD GLUCOSE            Urinalysis Basic - ( 11 Feb 2024 09:00 )    Color: x / Appearance: x / SG: x / pH: x  Gluc: 91 mg/dL / Ketone: x  / Bili: x / Urobili: x   Blood: x / Protein: x / Nitrite: x   Leuk Esterase: x / RBC: x / WBC x   Sq Epi: x / Non Sq Epi: x / Bacteria: x        Culture - Urine (collected 08 Feb 2024 17:05)  Source: Clean Catch Clean Catch (Midstream)  Final Report (09 Feb 2024 22:29):    No growth    Culture - Blood (collected 08 Feb 2024 15:10)  Source: .Blood Blood-Peripheral  Preliminary Report (10 Feb 2024 23:02):    No growth at 48 Hours    Culture - Blood (collected 08 Feb 2024 14:55)  Source: .Blood Blood-Peripheral  Preliminary Report (10 Feb 2024 23:02):    No growth at 48 Hours        RADIOLOGY & ADDITIONAL TESTS:    Imaging Personally Reviewed:  [x ] YES  [ ] NO

## 2024-02-14 NOTE — DISCHARGE NOTE PROVIDER - NSDCCPCAREPLAN_GEN_ALL_CORE_FT
PRINCIPAL DISCHARGE DIAGNOSIS  Diagnosis: Respiratory syncytial virus (RSV) infection  Assessment and Plan of Treatment:       SECONDARY DISCHARGE DIAGNOSES  Diagnosis: Acute hypoxic respiratory failure  Assessment and Plan of Treatment:     Diagnosis: Hypoxia  Assessment and Plan of Treatment:

## 2024-02-14 NOTE — DISCHARGE NOTE NURSING/CASE MANAGEMENT/SOCIAL WORK - NSDCDMETYPESERV_GEN_ALL_CORE_FT
Oxygen.  You will receive portable oxygen concentrator at hospital prior to patient leaving.  Supply company will call you to coordinate delivery of home concentrator.

## 2024-02-14 NOTE — PROGRESS NOTE ADULT - PROVIDER SPECIALTY LIST ADULT
Cardiology
Cardiology
Internal Medicine
Cardiology
Internal Medicine
Pulmonology
Cardiology
Infectious Disease
Infectious Disease
Internal Medicine
Pulmonology
Pulmonology
Internal Medicine
Internal Medicine
Pulmonology

## 2024-02-14 NOTE — DISCHARGE NOTE PROVIDER - CARE PROVIDER_API CALL
Goldberg, Steven M  Cardiovascular Disease  [address] [place] [id number]  Phone: [phone]  Fax: [phone]  Follow Up Time:

## 2024-02-14 NOTE — PROGRESS NOTE ADULT - REASON FOR ADMISSION
acute hypoxic respiratory failure 2/2 RSV

## 2024-02-14 NOTE — DISCHARGE NOTE NURSING/CASE MANAGEMENT/SOCIAL WORK - PATIENT PORTAL LINK FT
You can access the FollowMyHealth Patient Portal offered by [facility] by registering at the following website: [email or web address]. By joining [facility]’s FollowMyHealth portal, you will also be able to view your health information using other applications (apps) compatible with our system.

## 2024-02-14 NOTE — PROGRESS NOTE ADULT - ASSESSMENT
89 year old woman with hx of dementia, afib not on AC, hypothyroidism, frequent UTIs, GERD, and R total hip and knee replacement and presenting with about four days of a cough and wheezing.  Admitted for Sepsis and AHRF 2/2 RSV    RSV PNA  R/o superimposed bacterial PNA  AHRF     Imaging reviewed:  - CXR smal hiatal hernia, chronic thoracolumbar scoliosis and degenerative changes of the spine as well as chornic arthropathy of both shoulders  - CT Chest: Bilateral mild bronchiectasis as on June 29, 2019 with superimposed mild impacted distal and dilated airways may be due to internal secretions.  - CT Angio: no PE  BCx/UCx NGTD    Recommendations:   Overall clinical picture in setting of RSV; no obvious consolidation to suggest bacterial infection  S/p ceftriaxone and azithromycin since 2/8, dc-ed 2/12  Monitor off Abx  Trend temps/WBC  supportive care, O2 as needed  Appreciate pulm  Additional care per primary team    Stable from ID standpoint  D/c planning per primary team    D/w Dr. Brown  Infectious Diseases will continue to follow. Please call with any questions.   Sandra Lind M.D.  [facility] Division of Infectious Diseases [phone]  For after 5 P.M. and weekends, please call [phone]

## 2024-02-14 NOTE — DISCHARGE NOTE PROVIDER - NSDCMRMEDTOKEN_GEN_ALL_CORE_FT
Albuterol (Eqv-ProAir HFA) 90 mcg/inh inhalation aerosol: 2 puff(s) inhaled 4 times a day as needed for  shortness of breath and/or wheezing  dilTIAZem 300 mg/24 hours oral capsule, extended release: 1 cap(s) orally once a day  ferrous sulfate 200 mg (65 mg elemental iron) oral tablet: 1 tab(s) orally once a day  folic acid 1 mg oral tablet: 1 tab(s) orally once a day  Lasix 20 mg oral tablet: 1 tab(s) orally once a day  levothyroxine 75 mcg (0.075 mg) oral tablet: 1 tab(s) orally once a day  methenamine hippurate 1 g oral tablet: 1 tab(s) orally 2 times a day  pantoprazole 40 mg oral delayed release tablet: 1 tab(s) orally once a day  potassium chloride 10 mEq oral capsule, extended release: 1 cap(s) orally once a day  Seroquel 25 mg oral tablet: 0.5 tab(s) orally once a day (in the morning)  Seroquel 50 mg oral tablet: 1 tab(s) orally once a day (at bedtime)  sertraline 150 mg oral capsule: 1 cap(s) orally once a day  Tylenol 325 mg oral tablet: 2 tab(s) orally every 6 hours As needed Temp greater or equal to 38C (100.4F), Mild Pain (1 - 3)

## 2024-02-28 LAB
BASE EXCESS BLDA CALC-SCNC: -3.1 MMOL/L — LOW (ref -2–3)
HCO3 BLDA-SCNC: 21 MMOL/L — SIGNIFICANT CHANGE UP (ref 21–28)
HOROWITZ INDEX BLDA+IHG-RTO: 21 — SIGNIFICANT CHANGE UP
PCO2 BLDA: 32 MMHG — SIGNIFICANT CHANGE UP (ref 32–35)
PH BLDA: 7.43 — SIGNIFICANT CHANGE UP (ref 7.35–7.45)
PO2 BLDA: 81 MMHG — LOW (ref 83–108)
SAO2 % BLDA: 98.1 % — HIGH (ref 94–98)

## 2024-04-05 NOTE — PROGRESS NOTE ADULT - SUBJECTIVE AND OBJECTIVE BOX
Neurology follow up note    TYRON [id number]      Interval History:    Patient resting in bed     Allergies    Keflex (Other)    Intolerances        MEDICATIONS    acetaminophen     Tablet .. 650 milliGRAM(s) Oral every 6 hours PRN  diltiazem    milliGRAM(s) Oral daily  haloperidol    Injectable 0.5 milliGRAM(s) IntraMuscular once  heparin   Injectable 5000 Unit(s) SubCutaneous every 12 hours  levothyroxine 75 MICROGram(s) Oral daily  melatonin 3 milliGRAM(s) Oral at bedtime PRN  mirtazapine 15 milliGRAM(s) Oral at bedtime  ondansetron    Tablet 4 milliGRAM(s) Oral every 6 hours PRN  pantoprazole    Tablet 40 milliGRAM(s) Oral before breakfast  QUEtiapine 12.5 milliGRAM(s) Oral daily PRN  QUEtiapine 12.5 milliGRAM(s) Oral once PRN  QUEtiapine 25 milliGRAM(s) Oral at bedtime  sertraline 150 milliGRAM(s) Oral daily  sodium chloride 0.9%. 1000 milliLiter(s) IV Continuous <Continuous>              Vital Signs Last 24 Hrs  T(C): 36.7 (14 Mar 2023 05:18), Max: 37.4 (13 Mar 2023 20:42)  T(F): 98 (14 Mar 2023 05:18), Max: 99.3 (13 Mar 2023 20:42)  HR: 71 (14 Mar 2023 05:18) (63 - 75)  BP: 120/72 (14 Mar 2023 05:18) (116/70 - 142/74)  BP(mean): --  RR: 17 (14 Mar 2023 05:18) (17 - 18)  SpO2: 92% (14 Mar 2023 05:18) (63% - 92%)    Parameters below as of 14 Mar 2023 05:18  Patient On (Oxygen Delivery Method): nasal cannula      REVIEW OF SYSTEMS:  Constitutional:  The patient denies fever, chills, or night sweats.  Head:  No headache.  Eyes:  No double vision or blurry vision.  Ears:  No ringing in the ears.  Neck:  No neck pain.  Respiratory:  No shortness of breath.  Cardiovascular:  No chest pain.  Abdomen:  No nausea, vomiting, or abdominal pain.  Extremities/Neurological:  No numbness or tingling.  Musculoskeletal:  No joint pain.    PHYSICAL EXAMINATION:  HEENT:  Head:  Normocephalic, atraumatic.  Eyes:  No scleral icterus.  Ears:  Hearing bilaterally appeared intact.  NECK:  Supple.  RESPIRATORY:  Good air entry bilaterally.  CARDIOVASCULAR:  S1 and S2 heard.  ABDOMEN:  Soft and nontender.  EXTREMITIES:  No clubbing or cyanosis was noted.      NEUROLOGIC:  The patient is awake and alert.  Location was [facility],   Was able to name simple objects.  Does suffer from underlying memory issues, dementia.  Extraocular movements were intact.  Speech was fluent.  Smile symmetric.  Motor:  Bilateral upper 4/5, bilateral lower 3+/5.  Was able to follow complex commands.                LABS:  CBC Full  -  ( 13 Mar 2023 07:22 )  WBC Count : 6.35 K/uL  RBC Count : 3.90 M/uL  Hemoglobin : 12.3 g/dL  Hematocrit : 37.3 %  Platelet Count - Automated : 216 K/uL  Mean Cell Volume : 95.6 fl  Mean Cell Hemoglobin : 31.5 pg  Mean Cell Hemoglobin Concentration : 33.0 gm/dL  Auto Neutrophil # : 3.77 K/uL  Auto Lymphocyte # : 1.36 K/uL  Auto Monocyte # : 0.52 K/uL  Auto Eosinophil # : 0.61 K/uL  Auto Basophil # : 0.05 K/uL  Auto Neutrophil % : 59.4 %  Auto Lymphocyte % : 21.4 %  Auto Monocyte % : 8.2 %  Auto Eosinophil % : 9.6 %  Auto Basophil % : 0.8 %      03-13    135  |  105  |  20  ----------------------------<  88  4.3   |  27  |  1.20    Ca    9.1      13 Mar 2023 07:22    TPro  6.9  /  Alb  3.2<L>  /  TBili  0.3  /  DBili  x   /  AST  25  /  ALT  32  /  AlkPhos  53  03-13    Hemoglobin A1C:     LIVER FUNCTIONS - ( 13 Mar 2023 07:22 )  Alb: 3.2 g/dL / Pro: 6.9 g/dL / ALK PHOS: 53 U/L / ALT: 32 U/L / AST: 25 U/L / GGT: x           Vitamin B12         RADIOLOGY    ANALYSIS AND PLAN:  This is a 89-year-old with episode of altered mental status.  For altered mental status most likely secondary to dementia becoming more prominent in the hospital setting, suspect less likely this is a primary central nervous system event.  For dementia with agitation, would recommend Psychiatry followup with adjustment of medication as needed.  For history of dementia, we will check vitamin B12 and folate.  For history of hypothyroidism, continue the patient on Synthroid.    Spoke with daughter, Poly, at [phone].  Attempted to contact son, Clarke, at [phone], went to [facility].    Greater than 45 minutes of time was spent with the patient, plan of care, reviewing data, with greater than 50% of the visit was spent counseling and/or coordinating care with multidisciplinary healthcare team   Neurology follow up note    TYRON [id number]      Interval History:    Patient sitting in chair confused     Allergies    Keflex (Other)    Intolerances        MEDICATIONS    acetaminophen     Tablet .. 650 milliGRAM(s) Oral every 6 hours PRN  diltiazem    milliGRAM(s) Oral daily  haloperidol    Injectable 0.5 milliGRAM(s) IntraMuscular once  heparin   Injectable 5000 Unit(s) SubCutaneous every 12 hours  levothyroxine 75 MICROGram(s) Oral daily  melatonin 3 milliGRAM(s) Oral at bedtime PRN  mirtazapine 15 milliGRAM(s) Oral at bedtime  ondansetron    Tablet 4 milliGRAM(s) Oral every 6 hours PRN  pantoprazole    Tablet 40 milliGRAM(s) Oral before breakfast  QUEtiapine 12.5 milliGRAM(s) Oral daily PRN  QUEtiapine 12.5 milliGRAM(s) Oral once PRN  QUEtiapine 25 milliGRAM(s) Oral at bedtime  sertraline 150 milliGRAM(s) Oral daily  sodium chloride 0.9%. 1000 milliLiter(s) IV Continuous <Continuous>              Vital Signs Last 24 Hrs  T(C): 36.7 (14 Mar 2023 05:18), Max: 37.4 (13 Mar 2023 20:42)  T(F): 98 (14 Mar 2023 05:18), Max: 99.3 (13 Mar 2023 20:42)  HR: 71 (14 Mar 2023 05:18) (63 - 75)  BP: 120/72 (14 Mar 2023 05:18) (116/70 - 142/74)  BP(mean): --  RR: 17 (14 Mar 2023 05:18) (17 - 18)  SpO2: 92% (14 Mar 2023 05:18) (63% - 92%)    Parameters below as of 14 Mar 2023 05:18  Patient On (Oxygen Delivery Method): nasal cannula      REVIEW OF SYSTEMS:  Constitutional:  The patient denies fever, chills, or night sweats.  Head:  No headache.  Eyes:  No double vision or blurry vision.  Ears:  No ringing in the ears.  Neck:  No neck pain.  Respiratory:  No shortness of breath.  Cardiovascular:  No chest pain.  Abdomen:  No nausea, vomiting, or abdominal pain.  Extremities/Neurological:  No numbness or tingling.  Musculoskeletal:  No joint pain.    PHYSICAL EXAMINATION:  HEENT:  Head:  Normocephalic, atraumatic.  Eyes:  No scleral icterus.  Ears:  Hearing bilaterally appeared intact.  NECK:  Supple.  RESPIRATORY:  Good air entry bilaterally.  CARDIOVASCULAR:  S1 and S2 heard.  ABDOMEN:  Soft and nontender.  EXTREMITIES:  No clubbing or cyanosis was noted.      NEUROLOGIC:  The patient is awake and alert.  Location was hospital.    Was able to name simple objects.  Does suffer from underlying memory issues, dementia.  Extraocular movements were intact.  Speech was fluent.  Smile symmetric.  Motor:  Bilateral upper 4/5, bilateral lower 3+/5.  Was able to follow complex commands.                LABS:  CBC Full  -  ( 13 Mar 2023 07:22 )  WBC Count : 6.35 K/uL  RBC Count : 3.90 M/uL  Hemoglobin : 12.3 g/dL  Hematocrit : 37.3 %  Platelet Count - Automated : 216 K/uL  Mean Cell Volume : 95.6 fl  Mean Cell Hemoglobin : 31.5 pg  Mean Cell Hemoglobin Concentration : 33.0 gm/dL  Auto Neutrophil # : 3.77 K/uL  Auto Lymphocyte # : 1.36 K/uL  Auto Monocyte # : 0.52 K/uL  Auto Eosinophil # : 0.61 K/uL  Auto Basophil # : 0.05 K/uL  Auto Neutrophil % : 59.4 %  Auto Lymphocyte % : 21.4 %  Auto Monocyte % : 8.2 %  Auto Eosinophil % : 9.6 %  Auto Basophil % : 0.8 %      03-13    135  |  105  |  20  ----------------------------<  88  4.3   |  27  |  1.20    Ca    9.1      13 Mar 2023 07:22    TPro  6.9  /  Alb  3.2<L>  /  TBili  0.3  /  DBili  x   /  AST  25  /  ALT  32  /  AlkPhos  53  03-13    Hemoglobin A1C:     LIVER FUNCTIONS - ( 13 Mar 2023 07:22 )  Alb: 3.2 g/dL / Pro: 6.9 g/dL / ALK PHOS: 53 U/L / ALT: 32 U/L / AST: 25 U/L / GGT: x           Vitamin B12         RADIOLOGY    ANALYSIS AND PLAN:  This is a 89-year-old with episode of altered mental status.  For altered mental status most likely secondary to dementia becoming more prominent in the hospital setting, suspect less likely this is a primary central nervous system event.  For dementia with agitation, would recommend Psychiatry followup with adjustment of medication as needed.  For history of dementia   For history of hypothyroidism, continue the patient on Synthroid.    Spoke with daughter, Poly, at [phone] 3/14.  Attempted to contact son, Clarke, at [phone],     Greater than 40 minutes of time was spent with the patient, plan of care, reviewing data, with greater than 50% of the visit was spent counseling and/or coordinating care with multidisciplinary healthcare team   Additional Progress Note...

## 2024-05-03 PROBLEM — C48.1 MALIGNANT NEOPLASM OF SPECIFIED PARTS OF PERITONEUM: Chronic | Status: ACTIVE | Noted: 2024-02-08

## 2024-05-16 ENCOUNTER — APPOINTMENT (OUTPATIENT)
Dept: ORTHOPEDIC SURGERY | Facility: CLINIC | Age: 89
End: 2024-05-16
Payer: MEDICARE

## 2024-05-16 VITALS — BODY MASS INDEX: 27.49 KG/M2 | WEIGHT: 165 LBS | HEIGHT: 65 IN

## 2024-05-16 PROCEDURE — 73564 X-RAY EXAM KNEE 4 OR MORE: CPT | Mod: 50

## 2024-05-16 PROCEDURE — 99204 OFFICE O/P NEW MOD 45 MIN: CPT | Mod: 25

## 2024-05-16 PROCEDURE — 20610 DRAIN/INJ JOINT/BURSA W/O US: CPT | Mod: RT

## 2024-05-16 NOTE — ADDENDUM
[FreeTextEntry1] : I, RICHARD RAMOS, acted solely as a scribe for Dr. Cirilo Fernandez on this date 05/16/2024.  All medical record entries made by the Scribe were at my, Dr. Cirilo Fernandez, direction and personally dictated by me on 05/16/2024. I have reviewed the chart and agree that the record accurately reflects my personal performance of the history, physical exam, assessment and plan. I have also personally directed, reviewed, and agreed with the chart.

## 2024-05-16 NOTE — PHYSICAL EXAM
[de-identified] : Constitutional o Appearance : well-nourished, well developed, alert, in no acute distress  Head and Face o Head :  Inspection : atraumatic, normocephalic o Face :  Inspection : no visible rash or discoloration Respiratory o Respiratory Effort: breathing unlabored  Neurologic o Mental Status Examination :  Orientation : grossly oriented to person, place and time Psychiatric o Mood and Affect: mood normal, affect appropriate   Right Lower Extremity o Buttock : no tenderness, swelling or deformities  o Right Hip :  Inspection/Palpation : no tenderness, swelling or deformities  Range of Motion : full and painless in all planes, no crepitance  Stability : joint stability intact  Strength : extension, flexion, adduction, abduction, internal rotation and external rotation 5/5   o Right Knee :  Inspection/Palpation : no tenderness to palpation, no swelling, mild trophic changes   Range of Motion : 110 pain with flexion, no crepitance, decreased patellofemoral glide with pain  Stability : no valgus or varus instability present on provocative testing  Strength : flexion and extension 5/5  Tests and Signs : Anterior Drawer negative, Lachman negative, Howie's negative  Left Lower Extremity o Buttock : no tenderness, swelling or deformities  o Left Hip :  Inspection/Palpation : no tenderness, no swelling or deformities  Range of Motion : full and painless in all planes, no crepitance  Stability : joint stability intact  Strength : extension, flexion, adduction, abduction, internal rotation and external rotation 5/5  o Left Knee :  Inspection/Palpation : no tenderness to palpation, no swelling  Range of Motion : pain with flexion past 115, no crepitance  Stability : no valgus or varus instability present on provocative testing  Strength : flexion and extension 5/5  Tests and Signs : Anterior Drawer negative, Lachman negative, Howie's negative  Gait: using walker, significant valgus deformity, no significant extremity swelling or lymphedema  Radiology Results: 5/16/2024 Right Knee: Standing AP, lateral, tunnel and skyline views were obtained and reveal right knee bone on bone in the lateral compartment with moderate to severe patellofemoral arthritis calcification of the femoral artery  Left Knee: Standing AP, lateral, tunnel and skyline views were obtained and reveal bone on bone in the medial with moderate to severe patellofemoral arthritis   o Knee injection : Indication- right knee osteoarthritis, Anatomic location- right intra-articular joint space, Spray - area was sterilized with Betadine and alcohol and anesthetized with Ethyl Chloride , needle used-20G, Medications given- 5cc's lidocaine, 0.5cc's kenalog, 0.5 cc's dexamethasone. The patient tolerated the procedure well.

## 2024-05-16 NOTE — HISTORY OF PRESENT ILLNESS
[de-identified] : 89 year old female presents complaining of bilateral knee pain. She notes the right knee pain is worse than the left. She denies injury. She notes that her knee bothers her most when she is walking. She ambulates with a rollator. She denies buckling and swelling. She does not have pain that wakes her from sleep. Her left knee is not bothering her. Of note, she had right THR in 2019 with Dr. Carrillo. Patient presents ambulating with a walker.  She notes the right knee is worse than the left.  Feels like her knees could give out.

## 2024-05-16 NOTE — DISCUSSION/SUMMARY
[de-identified] : I went over the pathophysiology of the patient's symptoms in great detail with the patient. I discussed the underlying pathophysiology of the patient's condition in great detail with the patient. I went over the patient's x-rays with them in great detail. Viscosupplementation was discussed as a solution to the patient's symptoms and a booklet with information was provided. The patient elected to receive a cortisone injection into her right knee today, and tolerated it well. I instructed the patient on ROM exercises, and told them to take it easy. The use of ice and rest was reviewed with the patient. The patient may resume activities tomorrow.   All of their questions were answered. They understand and consent to the plan.  FU next week for a left knee cortisone injection and a right knee gel injection.

## 2024-05-20 NOTE — PATIENT PROFILE ADULT - CAREGIVER PHONE NUMBER
[facility] [place]  [No Acute Distress] : no acute distress [Normal Oropharynx] : normal oropharynx [Normal Appearance] : normal appearance [No Neck Mass] : no neck mass [Normal Rate/Rhythm] : normal rate/rhythm [Normal S1, S2] : normal s1, s2 [No Murmurs] : no murmurs [No Resp Distress] : no resp distress [Clear to Auscultation Bilaterally] : clear to auscultation bilaterally [No Abnormalities] : no abnormalities [Benign] : benign [Normal Gait] : normal gait [No Clubbing] : no clubbing [No Cyanosis] : no cyanosis [No Edema] : no edema [FROM] : FROM [Normal Color/ Pigmentation] : normal color/ pigmentation [No Focal Deficits] : no focal deficits [Oriented x3] : oriented x3 [Normal Affect] : normal affect

## 2024-05-30 ENCOUNTER — APPOINTMENT (OUTPATIENT)
Dept: ORTHOPEDIC SURGERY | Facility: CLINIC | Age: 89
End: 2024-05-30
Payer: MEDICARE

## 2024-05-30 VITALS — HEIGHT: 65 IN | BODY MASS INDEX: 27.49 KG/M2 | WEIGHT: 165 LBS

## 2024-05-30 DIAGNOSIS — Z96.641 PRESENCE OF RIGHT ARTIFICIAL HIP JOINT: ICD-10-CM

## 2024-05-30 PROCEDURE — 20611 DRAIN/INJ JOINT/BURSA W/US: CPT | Mod: RT

## 2024-05-30 PROCEDURE — 99214 OFFICE O/P EST MOD 30 MIN: CPT | Mod: 25

## 2024-05-30 PROCEDURE — 20610 DRAIN/INJ JOINT/BURSA W/O US: CPT | Mod: 59,LT

## 2024-05-30 NOTE — PHYSICAL EXAM
[de-identified] : Constitutional o Appearance : well-nourished, well developed, alert, in no acute distress  Head and Face o Head :  Inspection : atraumatic, normocephalic o Face :  Inspection : no visible rash or discoloration Respiratory o Respiratory Effort: breathing unlabored  Neurologic o Mental Status Examination :  Orientation : grossly oriented to person, place and time Psychiatric o Mood and Affect: mood normal, affect appropriate   Right Lower Extremity o Buttock : no tenderness, swelling or deformities  o Right Hip :  Inspection/Palpation : no tenderness, swelling or deformities  Range of Motion : full and painless in all planes, no crepitance  Stability : joint stability intact  Strength : extension, flexion, adduction, abduction, internal rotation and external rotation 5/5   o Right Knee :  Inspection/Palpation : no tenderness to palpation, no swelling, mild trophic changes   Range of Motion : 110 pain with flexion, no crepitance, decreased patellofemoral glide with pain  Stability : no valgus or varus instability present on provocative testing  Strength : flexion and extension 5/5  Tests and Signs : Anterior Drawer negative, Lachman negative, Howie's negative  Left Lower Extremity o Buttock : no tenderness, swelling or deformities  o Left Hip :  Inspection/Palpation : no tenderness, no swelling or deformities  Range of Motion : full and painless in all planes, no crepitance  Stability : joint stability intact  Strength : extension, flexion, adduction, abduction, internal rotation and external rotation 5/5  o Left Knee :  Inspection/Palpation : no tenderness to palpation, no swelling  Range of Motion : pain with flexion past 115, no crepitance  Stability : no valgus or varus instability present on provocative testing  Strength : flexion and extension 5/5  Tests and Signs : Anterior Drawer negative, Lachman negative, Howie's negative  Gait: using walker, significant valgus deformity, no significant extremity swelling or lymphedema  o Knee injection : Indication- left knee osteoarthritis, Anatomic location- left intra-articular joint space, Spray - area was sterilized with Betadine and alcohol and anesthetized with Ethyl Chloride , needle used-20G, Medications given- 5cc's lidocaine, 0.5cc's kenalog, 0.5 cc's dexamethasone. The patient tolerated the procedure well.  o Knee injection : Indication- right knee osteoarthritis, Anatomic location- right intra-articular joint space, Spray - area was sterilized with Betadine and alcohol and anesthetized with Ethyl Chloride , needle used-20G, Medications given- 4cc's Monovisc under Ultrasound guidance. The patient tolerated the procedure well.

## 2024-05-30 NOTE — HISTORY OF PRESENT ILLNESS
[de-identified] : 89 year old female presents for a right knee Monovisc injection and left knee cortisone injection today 5/30/2024. Of note, she had right THR in 2019 with Dr. Carrillo. Patient presents ambulating with a walker.  She had a right knee cortisone injection on 5/16/2024. She presents with her daughter.   Radiology Results: 5/16/2024 Right Knee: Standing AP, lateral, tunnel and skyline views were obtained and reveal right knee bone on bone in the lateral compartment with moderate to severe patellofemoral arthritis calcification of the femoral artery  Left Knee: Standing AP, lateral, tunnel and skyline views were obtained and reveal bone on bone in the medial with moderate to severe patellofemoral arthritis

## 2024-05-30 NOTE — DISCUSSION/SUMMARY
[de-identified] : I went over the pathophysiology of the patient's symptoms in great detail with the patient. The patient elected to receive a Monovisc injection into her right knee today, and tolerated it well. I instructed the patient on ROM exercises, and told them to take it easy. The use of ice and rest was reviewed with the patient. The patient may resume activities tomorrow. I reminded the patient that it takes 4 to 6 weeks after the final injection to feel symptom relief. The patient elected to receive a cortisone injection into her left knee today, and tolerated it well. I instructed the patient on ROM exercises, and told them to take it easy. The use of ice and rest was reviewed with the patient. The patient may resume activities tomorrow.   All of their questions were answered. They understand and consent to the plan.   FU next week for a left knee gel.

## 2024-05-30 NOTE — ADDENDUM
[FreeTextEntry1] : I, RICHARD RAMOS, acted solely as a scribe for Dr. Cirilo Fernandez on this date 05/30/2024.  All medical record entries made by the Scribe were at my, Dr. Cirilo Fernandez, direction and personally dictated by me on 05/30/2024. I have reviewed the chart and agree that the record accurately reflects my personal performance of the history, physical exam, assessment and plan. I have also personally directed, reviewed, and agreed with the chart.								 							  01-Oct-2021 10:31

## 2024-06-07 NOTE — ED PROVIDER NOTE - GASTROINTESTINAL, MLM
Pt called with c/o a-fib other multiple sx.    HR:    SX:  Dizziness  SOB on little exertion   Diaphoresis   Fatigue  Near syncope       Meds:  Lisinopril 10mg   Metoprolol tartrate 50mg   Eliquis 5mg BID    Pt believe she is still in a-fib. She describe it as flutter in the chest. Pt has an appt with  6/19/2024   Abdomen soft, non-tender, no guarding.

## 2024-06-12 ENCOUNTER — EMERGENCY (EMERGENCY)
Facility: HOSPITAL | Age: 89
LOS: 1 days | Discharge: HOME | End: 2024-06-12
Attending: EMERGENCY MEDICINE | Admitting: EMERGENCY MEDICINE
Payer: MEDICARE

## 2024-06-12 VITALS
OXYGEN SATURATION: 97 % | DIASTOLIC BLOOD PRESSURE: 80 MMHG | TEMPERATURE: 98 F | SYSTOLIC BLOOD PRESSURE: 138 MMHG | RESPIRATION RATE: 18 BRPM | HEART RATE: 62 BPM

## 2024-06-12 VITALS
RESPIRATION RATE: 16 BRPM | SYSTOLIC BLOOD PRESSURE: 140 MMHG | HEART RATE: 58 BPM | OXYGEN SATURATION: 96 % | HEIGHT: 63 IN | WEIGHT: 160.06 LBS | DIASTOLIC BLOOD PRESSURE: 76 MMHG | TEMPERATURE: 97 F

## 2024-06-12 DIAGNOSIS — R19.00 INTRA-ABDOMINAL AND PELVIC SWELLING, MASS AND LUMP, UNSPECIFIED SITE: Chronic | ICD-10-CM

## 2024-06-12 DIAGNOSIS — Z96.651 PRESENCE OF RIGHT ARTIFICIAL KNEE JOINT: Chronic | ICD-10-CM

## 2024-06-12 DIAGNOSIS — H26.493 OTHER SECONDARY CATARACT, BILATERAL: Chronic | ICD-10-CM

## 2024-06-12 DIAGNOSIS — Z98.89 OTHER SPECIFIED POSTPROCEDURAL STATES: Chronic | ICD-10-CM

## 2024-06-12 DIAGNOSIS — Z90.721 ACQUIRED ABSENCE OF OVARIES, UNILATERAL: Chronic | ICD-10-CM

## 2024-06-12 DIAGNOSIS — Z96.641 PRESENCE OF RIGHT ARTIFICIAL HIP JOINT: Chronic | ICD-10-CM

## 2024-06-12 PROCEDURE — 72170 X-RAY EXAM OF PELVIS: CPT

## 2024-06-12 PROCEDURE — 70486 CT MAXILLOFACIAL W/O DYE: CPT | Mod: MC

## 2024-06-12 PROCEDURE — 70486 CT MAXILLOFACIAL W/O DYE: CPT | Mod: 26,MC

## 2024-06-12 PROCEDURE — 73562 X-RAY EXAM OF KNEE 3: CPT

## 2024-06-12 PROCEDURE — 73562 X-RAY EXAM OF KNEE 3: CPT | Mod: 26,RT

## 2024-06-12 PROCEDURE — 99284 EMERGENCY DEPT VISIT MOD MDM: CPT | Mod: 25

## 2024-06-12 PROCEDURE — 70450 CT HEAD/BRAIN W/O DYE: CPT | Mod: 26,MC

## 2024-06-12 PROCEDURE — 70450 CT HEAD/BRAIN W/O DYE: CPT | Mod: MC

## 2024-06-12 PROCEDURE — 72170 X-RAY EXAM OF PELVIS: CPT | Mod: 26

## 2024-06-12 PROCEDURE — 99285 EMERGENCY DEPT VISIT HI MDM: CPT

## 2024-06-12 NOTE — ED ADULT NURSE NOTE - CHPI ED NUR SYMPTOMS NEG
no abrasion/no bleeding/no confusion/no deformity/no fever/no loss of consciousness/no numbness/no tingling/no vomiting/no weakness Initial (On Arrival)

## 2024-06-12 NOTE — ED ADULT NURSE NOTE - NSFALLHARMRISKINTERV_ED_ALL_ED

## 2024-06-12 NOTE — ED PROVIDER NOTE - PHYSICAL EXAMINATION
Gen: dementia, NAD  Head/eyes: NC/+right periorbital swelling ecchymosis, PERRL  ENT: airway patent  Neck: supple  Pulm/lung: Bilateral clear BS  CV/heart: RRR  GI/Abd: soft, NT/ND, +BS, no guarding/rebound tenderness  Musculoskeletal: no edema/erythema/cyanosis, FROM in all joints, mild ttp right knee, no swelling, no deformity  Skin: no rash  Neuro: dementia, grossly intact

## 2024-06-12 NOTE — ED PROVIDER NOTE - PATIENT PORTAL LINK FT
You can access the FollowMyHealth Patient Portal offered by [facility] by registering at the following website: [email or web address]. By joining [employer]’s FollowMyHealth portal, you will also be able to view your health information using other applications (apps) compatible with our system.

## 2024-06-12 NOTE — ED ADULT NURSE NOTE - CHIEF COMPLAINT QUOTE
Patient brought by ambulance from [facility] living unwitnessed fall complaining of right knee pain

## 2024-06-12 NOTE — ED PROVIDER NOTE - OBJECTIVE STATEMENT
89 yo female hx of dementia, afib not on AC, hypothyroidism, frequent UTIs, GERD, and R total hip and knee replacement brought in by EMS status post fall out of bed noted to have right sided facial swelling and bruising complaining of right knee pain.  Patient denies any LOC.  Patient is a poor historian unable to provide further history.

## 2024-06-12 NOTE — ED ADULT NURSE NOTE - CAS EDN INTEG ASSESS
Patient called and stated that it would be better for him if he was able to only come in yearly.  Please advise patient if that is possible.     - - -

## 2024-06-12 NOTE — ED ADULT NURSE NOTE - OBJECTIVE STATEMENT
Patient BIBEMS. According to son, sister saw on camera, patient on floor calling for help. EMS called. Patient does not recall fall. denies pain or discomfort at this time. Redness and bruising noticed to R eye. Patietn disoriented at baseline. Hx dementia.

## 2024-06-12 NOTE — ED PROVIDER NOTE - WR INTERPRETATION 2
[facility]  @FLOW([id number])@      Social Work Care Navigator Note      Patient: Milla Bose  Date: October 20, 2020  Preferred Name: Milla    Previous PHQ-9:   PHQ-9 SCORE 3/15/2018 9/19/2018 5/18/2020   PHQ-9 Total Score - - -   PHQ-9 Total Score 9 10 24     Previous BANDAR-7:   BANDAR-7 SCORE 2/13/2018 9/19/2018 5/18/2020   Total Score 7 20 18     BRIELLE LEVEL:  No flowsheet data found.    Preferred Contact: @TU([id number])@  Type of Contact Today: Phone call (patient / identified key support person reached)      Data: (subjective / Objective):  Patient Goals Areas: @FLOW([id number])@  Patient Stated Goals: @FLOW([id number])@  Recent ED/IP Admission or Discharge?   None  Recent ED/IP Admission or Discharge?   None    Patient Goals:  Goal Areas: Social and Interpersonal Relationships  Patient stated goals: Reunification with my kids        [facility] Core Service Provided:  Comprehensive Care Management: utilized the electronic medical record / patient registry to identify and support patient's health conditions / needs more effectively   Care Coordination: provided care management services/referrals necessary to ensure patient and their identified supports have access to medical, behavioral health, pharmacology and recovery support services.  Ensured that patient's care is integrated across all settings and services.   Health and Wellness Promotion  Individual and Family Support: aimed to help clients reduce barriers to achieving goals, increase health literacy and knowledge about chronic condition(s), increase self-efficacy skills, and improve health outcomes    Current Stressors / Issues / Care Plan Objective Addressed Today:  Dental pain    Intervention:  Motivational Interviewing: Expressed Empathy/Understanding, Supported Autonomy, Collaboration, Evocation, Permission to raise concern or advise and Open-ended questions   Target Behavior(s): NA    Assessment: (Progress on Goals / Homework):  Pt contacted  writer apologizing but stating she missed her last dental appt. Writer again advised this had been her last chance with [facility] and they will no longer see her due to her no-show rate.  She stated she needs to get in to a dentist ASAP.      Advised pt that the only other facility writer is aware of is [facility] ([phone]).  Advised her to call and schedule an appt and update writer if ride is needed.  Will remain available.    Plan: (Homework, other):  Patient was encouraged to continue to seek condition-related information and education.      Scheduled a Phone follow up appointment with SARINA MCWILLIAMS in 1 week     Patient has set self-identified goals and will monitor progress until the next appointment.   PIPE Levin, Social Work Care Coordinator     PIPE Levin  Social Work Care Coordinator  [facility]  [phone] option 2 or [phone]         djd, no fx

## 2024-06-12 NOTE — ED PROVIDER NOTE - CLINICAL SUMMARY MEDICAL DECISION MAKING FREE TEXT BOX
89 yo female hx of dementia, afib not on AC, hypothyroidism, frequent UTIs, GERD, and R total hip and knee replacement brought in by EMS status post fall out of bed noted to have right sided facial swelling and bruising complaining of right knee pain.  Patient denies any LOC.  Patient is a poor historian unable to provide further history.    r/o ich r/o fx, XR, CT head/face

## 2024-06-12 NOTE — ED ADULT TRIAGE NOTE - CHIEF COMPLAINT QUOTE
Patient brought by ambulance from [facility] living unwitnessed fall complaining of right knee pain

## 2024-06-17 ENCOUNTER — NON-APPOINTMENT (OUTPATIENT)
Age: 89
End: 2024-06-17

## 2024-06-18 ENCOUNTER — APPOINTMENT (OUTPATIENT)
Dept: ORTHOPEDIC SURGERY | Facility: CLINIC | Age: 89
End: 2024-06-18
Payer: MEDICARE

## 2024-06-18 VITALS — HEIGHT: 65 IN | WEIGHT: 165 LBS | BODY MASS INDEX: 27.49 KG/M2

## 2024-06-18 DIAGNOSIS — M17.0 BILATERAL PRIMARY OSTEOARTHRITIS OF KNEE: ICD-10-CM

## 2024-06-18 DIAGNOSIS — M16.11 UNILATERAL PRIMARY OSTEOARTHRITIS, RIGHT HIP: ICD-10-CM

## 2024-06-18 PROCEDURE — 20611 DRAIN/INJ JOINT/BURSA W/US: CPT | Mod: LT

## 2024-06-18 PROCEDURE — 99213 OFFICE O/P EST LOW 20 MIN: CPT | Mod: 25

## 2024-07-30 ENCOUNTER — APPOINTMENT (OUTPATIENT)
Dept: ORTHOPEDIC SURGERY | Facility: CLINIC | Age: 89
End: 2024-07-30
Payer: MEDICARE

## 2024-07-30 VITALS — BODY MASS INDEX: 27.49 KG/M2 | WEIGHT: 165 LBS | HEIGHT: 65 IN

## 2024-07-30 DIAGNOSIS — M17.0 BILATERAL PRIMARY OSTEOARTHRITIS OF KNEE: ICD-10-CM

## 2024-07-30 PROCEDURE — 99213 OFFICE O/P EST LOW 20 MIN: CPT

## 2024-07-30 NOTE — ADDENDUM
[FreeTextEntry1] : I, RICHARD RAMOS, acted solely as a scribe for Dr. Cirilo Fernandez on this date 07/30/2024.  All medical record entries made by the Scribe were at my, Dr. Cirilo Fernandez, direction and personally dictated by me on 07/30/2024. I have reviewed the chart and agree that the record accurately reflects my personal performance of the history, physical exam, assessment and plan. I have also personally directed, reviewed, and agreed with the chart.

## 2024-07-30 NOTE — PHYSICAL EXAM
[de-identified] : Constitutional o Appearance : well-nourished, well developed, alert, in no acute distress  Head and Face o Head :  Inspection : atraumatic, normocephalic o Face :  Inspection : no visible rash or discoloration Respiratory o Respiratory Effort: breathing unlabored  Neurologic o Mental Status Examination :  Orientation : grossly oriented to person, place and time Psychiatric o Mood and Affect: mood normal, affect appropriate   Right Lower Extremity o Buttock : no tenderness, swelling or deformities  o Right Hip :  Inspection/Palpation : no tenderness, swelling or deformities  Range of Motion : full and painless in all planes, no crepitance  Stability : joint stability intact  Strength : extension, flexion, adduction, abduction, internal rotation and external rotation 5/5   o Right Knee :  Inspection/Palpation : lateral compartment  tenderness to palpation, no swelling, mild trophic changes   Range of Motion : 110 pain with flexion, no crepitance, decreased patellofemoral glide with pain  Stability : no valgus or varus instability present on provocative testing  Strength : flexion and extension 5/5  Tests and Signs : Anterior Drawer negative, Lachman negative, Howie's negative  Left Lower Extremity o Buttock : no tenderness, swelling or deformities  o Left Hip :  Inspection/Palpation : no tenderness, no swelling or deformities  Range of Motion : full and painless in all planes, no crepitance  Stability : joint stability intact  Strength : extension, flexion, adduction, abduction, internal rotation and external rotation 5/5  o Left Knee :  Inspection/Palpation : medial compartment tenderness  tenderness to palpation, no swelling  Range of Motion : pain with flexion past 115, no crepitance  Stability : no valgus or varus instability present on provocative testing  Strength : flexion and extension 5/5  Tests and Signs : Anterior Drawer negative, Lachman negative, Howie's negative  Gait: using walker, significant valgus deformity, no significant extremity swelling or lymphedema

## 2024-07-30 NOTE — HISTORY OF PRESENT ILLNESS
[de-identified] : 89 year old female presents for a bilateral knee follow-up. She had a left knee Monovisc injection on 6/18/2024. She had a right knee Monovisc and left knee cortisone injection 05/30/2024. She had a right knee cortisone injection on 5/16/2024. She notes her knees are bothering her. She notes the right knee is bothering her. Of note, she had right THR in 2019 with Dr. Carrillo. Patient presents ambulating with a walker. She presents with her son Clarke and notes she has not been complaining.   Radiology Results: 5/16/2024 Right Knee: Standing AP, lateral, tunnel and skyline views were obtained and reveal right knee bone on bone in the lateral compartment with moderate to severe patellofemoral arthritis calcification of the femoral artery  Left Knee: Standing AP, lateral, tunnel and skyline views were obtained and reveal bone on bone in the medial with moderate to severe patellofemoral arthritis

## 2024-08-19 NOTE — PROGRESS NOTE ADULT - PROBLEM SELECTOR PLAN 4
c/w home meds Diltiazem 300mg qd and lasix 20mg qd  - monitor routine hemodynamics
2

## 2024-12-02 ENCOUNTER — APPOINTMENT (OUTPATIENT)
Dept: ORTHOPEDIC SURGERY | Facility: CLINIC | Age: 89
End: 2024-12-02
Payer: MEDICARE

## 2024-12-02 VITALS — HEIGHT: 65 IN | BODY MASS INDEX: 27.49 KG/M2 | WEIGHT: 165 LBS

## 2024-12-02 DIAGNOSIS — M16.11 UNILATERAL PRIMARY OSTEOARTHRITIS, RIGHT HIP: ICD-10-CM

## 2024-12-02 PROCEDURE — 20611 DRAIN/INJ JOINT/BURSA W/US: CPT | Mod: RT

## 2024-12-11 NOTE — DISCHARGE NOTE PROVIDER - NSDCDCMDCOMP_GEN_ALL_CORE
Hub staff attempted to follow warm transfer process and was unsuccessful     Caller: Mckenzie Miller    Relationship to patient: SELF    Best call back number: [phone]     Patient is needing: PATIENT IS WANTING TO SCHEDULE SAME DAY FOR CHEST CONGESTION AND FEVER.         This document is complete and the patient is ready for discharge.

## 2024-12-19 ENCOUNTER — APPOINTMENT (OUTPATIENT)
Dept: ORTHOPEDIC SURGERY | Facility: CLINIC | Age: 89
End: 2024-12-19
Payer: MEDICARE

## 2024-12-19 VITALS — HEIGHT: 65 IN | BODY MASS INDEX: 27.49 KG/M2 | WEIGHT: 165 LBS

## 2024-12-19 DIAGNOSIS — M17.0 BILATERAL PRIMARY OSTEOARTHRITIS OF KNEE: ICD-10-CM

## 2024-12-19 PROCEDURE — 20611 DRAIN/INJ JOINT/BURSA W/US: CPT | Mod: LT

## 2024-12-27 NOTE — DISCHARGE NOTE NURSING/CASE MANAGEMENT/SOCIAL WORK - NSDCDMENUMBER_GEN_ALL_CORE_FT
Thank you for choosing [facility].  We know you have options when it comes to your healthcare; we appreciate that you chose us. Our goal is to provide exceptional  service and world class care to every patient.  You will be receiving a survey via email or text message asking for your feedback.  Please take a few minutes to share your thoughts about your recent visit. Your comments helps us understand what we do well and ways we can improve.  Thank you in advance for your valuable feedback.        [facility]  [address], [place] [address]  [phone]    [facility] for [facility] in Brief Therapy  [address]  [phone]    [facility]  [address]  [phone]           Well Visit, Ages 18 to 65: Care Instructions  Well visits can help you stay healthy. Your doctor has checked your overall health and may have suggested ways to take good care of yourself. Your doctor also may have recommended tests. You can help prevent illness with healthy eating, good sleep, vaccinations, regular exercise, and other steps.    Get the tests that you and your doctor decide on. Depending on your age and risks, examples might include screening for diabetes; hepatitis C; HIV; and cervical, breast, lung, and colon cancer. Screening helps find diseases before any symptoms appear.   Eat healthy foods. Choose fruits, vegetables, whole grains, lean protein, and low-fat dairy foods. Limit saturated fat and reduce salt.     Limit alcohol. Men should have no more than 2 drinks a day. Women should have no more than 1. For some people, no alcohol is the best choice.   Exercise. Get at least 30 minutes of exercise on most days of the week. Walking can be a good choice.     Reach and stay at your healthy weight. This will lower your risk for many health problems.   Take care of your mental health. Try to stay connected with friends, family, and community, and find ways to manage stress.     If 
[phone]

## 2025-02-25 ENCOUNTER — APPOINTMENT (OUTPATIENT)
Dept: ORTHOPEDIC SURGERY | Facility: CLINIC | Age: 89
End: 2025-02-25
Payer: MEDICARE

## 2025-02-25 DIAGNOSIS — M17.12 UNILATERAL PRIMARY OSTEOARTHRITIS, LEFT KNEE: ICD-10-CM

## 2025-02-25 DIAGNOSIS — M17.11 UNILATERAL PRIMARY OSTEOARTHRITIS, RIGHT KNEE: ICD-10-CM

## 2025-02-25 PROCEDURE — 20610 DRAIN/INJ JOINT/BURSA W/O US: CPT | Mod: RT

## 2025-02-25 PROCEDURE — 99213 OFFICE O/P EST LOW 20 MIN: CPT | Mod: 25

## 2025-05-11 ENCOUNTER — EMERGENCY (EMERGENCY)
Facility: HOSPITAL | Age: 89
LOS: 1 days | End: 2025-05-11
Attending: STUDENT IN AN ORGANIZED HEALTH CARE EDUCATION/TRAINING PROGRAM
Payer: MEDICARE

## 2025-05-11 VITALS
HEART RATE: 63 BPM | SYSTOLIC BLOOD PRESSURE: 139 MMHG | RESPIRATION RATE: 18 BRPM | DIASTOLIC BLOOD PRESSURE: 63 MMHG | OXYGEN SATURATION: 94 % | TEMPERATURE: 98 F

## 2025-05-11 VITALS
SYSTOLIC BLOOD PRESSURE: 112 MMHG | OXYGEN SATURATION: 93 % | RESPIRATION RATE: 16 BRPM | WEIGHT: 154.98 LBS | DIASTOLIC BLOOD PRESSURE: 68 MMHG | HEIGHT: 66 IN | HEART RATE: 72 BPM | TEMPERATURE: 98 F

## 2025-05-11 DIAGNOSIS — Z90.721 ACQUIRED ABSENCE OF OVARIES, UNILATERAL: Chronic | ICD-10-CM

## 2025-05-11 DIAGNOSIS — Z96.641 PRESENCE OF RIGHT ARTIFICIAL HIP JOINT: Chronic | ICD-10-CM

## 2025-05-11 DIAGNOSIS — H26.493 OTHER SECONDARY CATARACT, BILATERAL: Chronic | ICD-10-CM

## 2025-05-11 DIAGNOSIS — Z98.89 OTHER SPECIFIED POSTPROCEDURAL STATES: Chronic | ICD-10-CM

## 2025-05-11 DIAGNOSIS — Z96.651 PRESENCE OF RIGHT ARTIFICIAL KNEE JOINT: Chronic | ICD-10-CM

## 2025-05-11 DIAGNOSIS — R19.00 INTRA-ABDOMINAL AND PELVIC SWELLING, MASS AND LUMP, UNSPECIFIED SITE: Chronic | ICD-10-CM

## 2025-05-11 PROCEDURE — 99284 EMERGENCY DEPT VISIT MOD MDM: CPT | Mod: GC

## 2025-05-11 PROCEDURE — 99283 EMERGENCY DEPT VISIT LOW MDM: CPT | Mod: 25

## 2025-05-11 PROCEDURE — 73590 X-RAY EXAM OF LOWER LEG: CPT

## 2025-05-11 PROCEDURE — 90471 IMMUNIZATION ADMIN: CPT

## 2025-05-11 PROCEDURE — 90715 TDAP VACCINE 7 YRS/> IM: CPT

## 2025-05-11 PROCEDURE — 73590 X-RAY EXAM OF LOWER LEG: CPT | Mod: 26,LT

## 2025-05-11 RX ORDER — ACETAMINOPHEN 500 MG/5ML
975 LIQUID (ML) ORAL ONCE
Refills: 0 | Status: COMPLETED | OUTPATIENT
Start: 2025-05-11 | End: 2025-05-11

## 2025-05-11 RX ORDER — LIDOCAINE HCL/PF 10 MG/ML
10 VIAL (ML) INJECTION ONCE
Refills: 0 | Status: COMPLETED | OUTPATIENT
Start: 2025-05-11 | End: 2025-05-11

## 2025-05-11 RX ORDER — IBUPROFEN 200 MG
600 TABLET ORAL ONCE
Refills: 0 | Status: COMPLETED | OUTPATIENT
Start: 2025-05-11 | End: 2025-05-11

## 2025-05-11 RX ORDER — CLOSTRIDIUM TETANI TOXOID ANTIGEN (FORMALDEHYDE INACTIVATED), CORYNEBACTERIUM DIPHTHERIAE TOXOID ANTIGEN (FORMALDEHYDE INACTIVATED), BORDETELLA PERTUSSIS TOXOID ANTIGEN (GLUTARALDEHYDE INACTIVATED), BORDETELLA PERTUSSIS FILAMENTOUS HEMAGGLUTININ ANTIGEN (FORMALDEHYDE INACTIVATED), BORDETELLA PERTUSSIS PERTACTIN ANTIGEN, AND BORDETELLA PERTUSSIS FIMBRIAE 2/3 ANTIGEN 5; 2; 2.5; 5; 3; 5 [LF]/.5ML; [LF]/.5ML; UG/.5ML; UG/.5ML; UG/.5ML; UG/.5ML
0.5 INJECTION, SUSPENSION INTRAMUSCULAR ONCE
Refills: 0 | Status: COMPLETED | OUTPATIENT
Start: 2025-05-11 | End: 2025-05-11

## 2025-05-11 RX ORDER — CEFUROXIME SODIUM 1.5 G
1 VIAL (EA) INJECTION
Qty: 14 | Refills: 0
Start: 2025-05-11 | End: 2025-05-17

## 2025-05-11 RX ORDER — CEFUROXIME SODIUM 1.5 G
500 VIAL (EA) INJECTION ONCE
Refills: 0 | Status: COMPLETED | OUTPATIENT
Start: 2025-05-11 | End: 2025-05-11

## 2025-05-11 RX ADMIN — Medication 975 MILLIGRAM(S): at 17:20

## 2025-05-11 RX ADMIN — Medication 500 MILLIGRAM(S): at 18:48

## 2025-05-11 RX ADMIN — Medication 600 MILLIGRAM(S): at 17:20

## 2025-05-11 RX ADMIN — CLOSTRIDIUM TETANI TOXOID ANTIGEN (FORMALDEHYDE INACTIVATED), CORYNEBACTERIUM DIPHTHERIAE TOXOID ANTIGEN (FORMALDEHYDE INACTIVATED), BORDETELLA PERTUSSIS TOXOID ANTIGEN (GLUTARALDEHYDE INACTIVATED), BORDETELLA PERTUSSIS FILAMENTOUS HEMAGGLUTININ ANTIGEN (FORMALDEHYDE INACTIVATED), BORDETELLA PERTUSSIS PERTACTIN ANTIGEN, AND BORDETELLA PERTUSSIS FIMBRIAE 2/3 ANTIGEN 0.5 MILLILITER(S): 5; 2; 2.5; 5; 3; 5 INJECTION, SUSPENSION INTRAMUSCULAR at 17:17

## 2025-05-11 RX ADMIN — Medication 10 MILLILITER(S): at 17:23

## 2025-05-11 NOTE — ED PROVIDER NOTE - NSFOLLOWUPCLINICS_GEN_ALL_ED_FT
[facility]  Wound Care  [address] [place] [address]  Phone: [phone]  Fax: [phone]    [facility]  Wound Care  [address] [place] [address]  Phone: [phone]  Fax: [phone]    [facility]  Wound Care  [address] [place] [address]  Phone: [phone]  Fax:

## 2025-05-11 NOTE — ED PROVIDER NOTE - NSFOLLOWUPINSTRUCTIONS_ED_ALL_ED_FT
You were seen in the emergency department for an skin tear of the left leg.  This wound was cleaned.  The nurse here has connected you with wound care.  I provided you with supplies to help clean the wound and bandage.    Please observe for signs of infection including increased redness, increased swelling, development of pus, fevers.  If you see these signs please immediately return to the emergency department.    Please clean the area with warm clean water, clean cloth.  Do not submerge the wound in water.  Do not use hydroperoxide or rubbing alcohol.      For pain you may use over-the-counter medication such as Tylenol. I am sending a prescription for an antibiotic to your pharmacy.  Please pick this medication up and take as directed.    Please follow-up with the wound care doctors for further steps in management as well as your primary care doctor in the next 1-3 days for further management of all symptoms.

## 2025-05-11 NOTE — ED PROVIDER NOTE - PHYSICAL EXAMINATION
GEN: Patient awake and alert. No acute distress, non-toxic.  CARDIAC: RRR.  PULM: CTA B/L no wheeze. No signs of respiratory distress, no accessory muscle usage or nasal flaring.  ABD: Soft, nontender, nondistended.   MSK: Moving all extremities spontaneously  NEURO: A&Ox1-2  SKIN: Warm, dry. GEN: Patient awake and alert. No acute distress, non-toxic.  CARDIAC: RRR.  PULM: CTA B/L no wheeze. No signs of respiratory distress, no accessory muscle usage or nasal flaring.  ABD: Soft, nontender, nondistended.   MSK: Moving all extremities spontaneously  NEURO: A&Ox1-2  SKIN: Warm, dry. GEN: Patient awake and alert. No acute distress, non-toxic.  CARDIAC: RRR.  PULM: CTA B/L no wheeze. No signs of respiratory distress, no accessory muscle usage or nasal flaring.  ABD: Soft, nontender, nondistended.   MSK: Moving all extremities spontaneously  NEURO: A&Ox1-2  SKIN: Warm, dry. GEN: Patient awake and alert. No acute distress, non-toxic.  CARDIAC: RRR.  PULM: CTA B/L no wheeze. No signs of respiratory distress, no accessory muscle usage or nasal flaring.  ABD: Soft, nontender, nondistended.   MSK: Moving all extremities spontaneously  NEURO: A&Ox1-2  SKIN: Warm, dry. 10 cm circumferential hemostatic skin tear to the left lower extremity.

## 2025-05-11 NOTE — ED ADULT NURSE NOTE - NSFALLHARMRISKINTERV_ED_ALL_ED
Assistance OOB with selected safe patient handling equipment if applicable/Assistance with ambulation/Communicate risk of Fall with Harm to all staff, patient, and family/Monitor gait and stability/Provide visual cue: red socks, yellow wristband, yellow gown, etc/Reinforce activity limits and safety measures with patient and family/Bed in lowest position, wheels locked, appropriate side rails in place/Call bell, personal items and telephone in reach/Instruct patient to call for assistance before getting out of bed/chair/stretcher/Non-slip footwear applied when patient is off stretcher/Arabi to call system/Physically safe environment - no spills, clutter or unnecessary equipment/Purposeful Proactive Rounding/Room/bathroom lighting operational, light cord in reach

## 2025-05-11 NOTE — ED PROVIDER NOTE - HOW PATIENT ADDRESSED, PROFILE
"TXP LIVER COORDINATOR ORGAN OFFER NOTE   UNOS# [id number]  Notified by Gibran Mayers, , that Jed Chávez is eligible for liver as a backup recipient offer.  Spoke with patient and identified no acute medical issues with telephone assessment. Protocol script read to patient regarding PHS risk criteria donor offer .  Patient was informed that if he turned down the offer A transplant doctor will call you after we hang up."  Patient was also informed that if he turned down this donor, he would not be punished or removed from the transplant list, that he  would remain on the list at his current status/MELD score. However, by waiting on the list longer rather than receiving this transplant, he will face a greater risk of death than any risk from the slight possibility of transmitted infection.  Patient was asked if they have had a positive COVID-19 test or if they have any signs or symptoms. Informed patient that they will be tested for COVID-19 upon arrival to the hospital, unless have a previous positive result. If tested and result is positive, the transplant will not be able to occur, they will be inactivated on the wait list for 21 days per protocol and required to quarantine.   Patient verbalized understanding, all questions answered, patient declines organ offer.       " Clari

## 2025-05-11 NOTE — ED PROVIDER NOTE - ATTENDING CONTRIBUTION TO CARE
Attending Babar: I performed a history and physical exam of the patient and discussed their management with the resident/fellow/student. I have reviewed the resident/fellow/student note and agree with the documented findings and plan of care, except as noted. I have personally performed a substantive portion of the visit including all aspects of the medical decision making. My medical decision making and observations are found below. Please refer to any progress notes for updates on clinical course. My notes supersedes the above resident/fellow/student note in case of discrepancy    MDM:  89 y/o F w/ PMH of a fib not on AC, GERD, dementia, hypothyroid presenting w/ L leg wound. Seen w/ family. Reports was at mother's day brunch today and while pt was sitting, a chair next to her fell over and scraped her leg. Pt sustained large wound to leg that was bleeding heavily so family applied quick clot which helped with the bleeding. pt was brought to ED for further eval. Denies injuries elsewhere. Endorsing minor pain at the site. Unsure last tetanus. Denies fevers, chills, headache, dizziness, blurred vision, chest pain, cough, shortness of breath, abdominal pain, n/v/d/c, urinary symptoms, rash.     Gen: NAD, AOx3, able to make needs known, non-toxic  Head: NCAT  HEENT: EOMI, oral mucosa moist, normal conjunctiva  Lung: speaking in full sentences, no respiratory distress, CTAB  CV: RRR  Abd: non distended, soft, nontender, no guarding, no CVA tenderness  MSK: no visible deformities  Neuro: Appears non focal  Skin: Warm, well perfused. LLE: NVI, ant-lat lower leg w/ approx 10 cm skin tear flap  Psych: normal affect    Pt here w/ L leg wound. Will obtain xrays to ensure no fracture. Wound will need to be cleaned. Likely no suture repair given avulsion type injury of very thin skin. Likely plan for wet to dry type dressing w/ oral abx for wound infection ppx. Will need f/u w/ wound care. Plan for imaging, meds. Will reassess the need for additional interventions as clinically warranted. Refer to any progress notes for updates on clinical course and as a continuation of this MDM.

## 2025-05-11 NOTE — ED PROVIDER NOTE - OBJECTIVE STATEMENT
89-year-old female PMHx significant for dementia, A-fib not on AC, hypothyroidism, frequent UTIs, GERD, right total hip and knee replacement, presenting after impact to the left lower extremity by folding chair.  Patient was sitting down when the chair fall onto her causing skin tear with significant bleeding.  Daughter initially plied hemostatic agent which decreased bleeding.  Presented to the ED for further evaluation of lower extremity bleeding.  Otherwise no focal findings on exam, no other ported symptoms.  No LOC, no head strike.

## 2025-05-11 NOTE — ED PROVIDER NOTE - CLINICAL SUMMARY MEDICAL DECISION MAKING FREE TEXT BOX
89-year-old female PMHx significant for dementia, A-fib not on AC, hypothyroidism, frequent UTIs, GERD, right total hip and knee replacement, presenting after impact to the left lower extremity by folding chair. Physical exam notable for extensive skin tear approximately 10 cm across, hemostatic.  Otherwise no focal findings on exam given HPI will obtain x-ray of the left lower extremity to ensure no bony deformity.  Otherwise will irrigate the wound, apply bandages and have patient follow-up with wound care. 89-year-old female PMHx significant for dementia, A-fib not on AC, hypothyroidism, frequent UTIs, GERD, right total hip and knee replacement, presenting after impact to the left lower extremity by folding chair. Physical exam notable for extensive skin tear approximately 10 cm across, hemostatic.  Otherwise no focal findings on exam given HPI will obtain x-ray of the left lower extremity to ensure no bony deformity.  Otherwise will irrigate the wound, apply bandages and have patient follow-up with wound care.    Attending Babar: See attending attestation.

## 2025-05-11 NOTE — ED PROVIDER NOTE - PATIENT PORTAL LINK FT
You can access the FollowMyHealth Patient Portal offered by [facility] by registering at the following website: [email or web address]. By joining [employer]’s FollowMyHealth portal, you will also be able to view your health information using other applications (apps) compatible with our system.

## 2025-05-11 NOTE — ED PROVIDER NOTE - PROGRESS NOTE DETAILS
Attending Laryo: Wound care instructions: cleanse with saline. wet to dry dressing changes daily. please wrap with cling wrap. Attending Babar: wound cleaned and dressed.  Britton sent referral to wound care team to have pt evaluated by them outpatient for dressing changes and further wound care. abx given and ordered for wound prophylaxis. discussed w/ family. all comfortable w/ DC. family to bring pt home.

## 2025-05-13 ENCOUNTER — APPOINTMENT (OUTPATIENT)
Dept: WOUND CARE | Facility: HOSPITAL | Age: 89
End: 2025-05-13
Payer: MEDICARE

## 2025-05-13 ENCOUNTER — OUTPATIENT (OUTPATIENT)
Dept: OUTPATIENT SERVICES | Facility: HOSPITAL | Age: 89
LOS: 1 days | End: 2025-05-13
Payer: MEDICARE

## 2025-05-13 VITALS
RESPIRATION RATE: 16 BRPM | HEART RATE: 70 BPM | OXYGEN SATURATION: 94 % | TEMPERATURE: 98 F | DIASTOLIC BLOOD PRESSURE: 69 MMHG | BODY MASS INDEX: 27.49 KG/M2 | HEIGHT: 65 IN | WEIGHT: 165 LBS | SYSTOLIC BLOOD PRESSURE: 99 MMHG

## 2025-05-13 DIAGNOSIS — Z85.831 PERSONAL HISTORY OF MALIGNANT NEOPLASM OF SOFT TISSUE: ICD-10-CM

## 2025-05-13 DIAGNOSIS — Z88.1 ALLERGY STATUS TO OTHER ANTIBIOTIC AGENTS: ICD-10-CM

## 2025-05-13 DIAGNOSIS — Z82.61 FAMILY HISTORY OF ARTHRITIS: ICD-10-CM
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
## 2025-05-13 DIAGNOSIS — I48.0 PAROXYSMAL ATRIAL FIBRILLATION: ICD-10-CM

## 2025-05-13 DIAGNOSIS — Z80.9 FAMILY HISTORY OF MALIGNANT NEOPLASM, UNSPECIFIED: ICD-10-CM

## 2025-05-13 DIAGNOSIS — Z82.62 FAMILY HISTORY OF OSTEOPOROSIS: ICD-10-CM

## 2025-05-13 DIAGNOSIS — S81.812D LACERATION WITHOUT FOREIGN BODY, LEFT LOWER LEG, SUBSEQUENT ENCOUNTER: ICD-10-CM

## 2025-05-13 DIAGNOSIS — Z90.722 ACQUIRED ABSENCE OF OVARIES, BILATERAL: ICD-10-CM

## 2025-05-13 DIAGNOSIS — R60.0 LOCALIZED EDEMA: ICD-10-CM

## 2025-05-13 DIAGNOSIS — K21.9 GASTRO-ESOPHAGEAL REFLUX DISEASE WITHOUT ESOPHAGITIS: ICD-10-CM

## 2025-05-13 DIAGNOSIS — Z96.641 PRESENCE OF RIGHT ARTIFICIAL HIP JOINT: Chronic | ICD-10-CM
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
## 2025-05-13 DIAGNOSIS — Z98.89 OTHER SPECIFIED POSTPROCEDURAL STATES: Chronic | ICD-10-CM

## 2025-05-13 DIAGNOSIS — Z79.899 OTHER LONG TERM (CURRENT) DRUG THERAPY: ICD-10-CM

## 2025-05-13 DIAGNOSIS — L97.801 NON-PRESSURE CHRONIC ULCER OF OTHER PART OF UNSPECIFIED LOWER LEG LIMITED TO BREAKDOWN OF SKIN: ICD-10-CM

## 2025-05-13 DIAGNOSIS — Z96.651 PRESENCE OF RIGHT ARTIFICIAL KNEE JOINT: Chronic | ICD-10-CM

## 2025-05-13 DIAGNOSIS — M81.0 AGE-RELATED OSTEOPOROSIS WITHOUT CURRENT PATHOLOGICAL FRACTURE: ICD-10-CM

## 2025-05-13 DIAGNOSIS — Z87.39 PERSONAL HISTORY OF OTHER DISEASES OF THE MUSCULOSKELETAL SYSTEM AND CONNECTIVE TISSUE: ICD-10-CM

## 2025-05-13 DIAGNOSIS — Z90.721 ACQUIRED ABSENCE OF OVARIES, UNILATERAL: Chronic | ICD-10-CM

## 2025-05-13 DIAGNOSIS — R19.00 INTRA-ABDOMINAL AND PELVIC SWELLING, MASS AND LUMP, UNSPECIFIED SITE: Chronic | ICD-10-CM
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
## 2025-05-13 DIAGNOSIS — Z96.641 PRESENCE OF RIGHT ARTIFICIAL HIP JOINT: ICD-10-CM

## 2025-05-13 DIAGNOSIS — H26.493 OTHER SECONDARY CATARACT, BILATERAL: Chronic | ICD-10-CM

## 2025-05-13 PROCEDURE — G0463: CPT

## 2025-05-13 PROCEDURE — 99204 OFFICE O/P NEW MOD 45 MIN: CPT

## 2025-05-13 RX ORDER — DICLOFENAC SODIUM 10 MG/G
1 GEL TOPICAL
Refills: 0 | Status: ACTIVE | COMMUNITY

## 2025-05-13 RX ORDER — L. ACIDOPHILUS/L.BULGARICUS 1MM CELL
TABLET ORAL
Refills: 0 | Status: ACTIVE | COMMUNITY

## 2025-05-13 RX ORDER — METHENAMINE HIPPURATE 1 G/1
1 TABLET ORAL
Refills: 0 | Status: ACTIVE | COMMUNITY

## 2025-05-13 RX ORDER — DOCUSATE SODIUM 100 MG
100 TABLET ORAL
Refills: 0 | Status: ACTIVE | COMMUNITY

## 2025-05-13 RX ORDER — FUROSEMIDE 20 MG/1
20 TABLET ORAL
Refills: 0 | Status: ACTIVE | COMMUNITY

## 2025-05-13 RX ORDER — PANTOPRAZOLE SODIUM 40 MG/1
40 GRANULE, DELAYED RELEASE ORAL
Refills: 0 | Status: ACTIVE | COMMUNITY

## 2025-05-13 RX ORDER — CHOLECALCIFEROL (VITAMIN D3) 50 MCG
50 MCG TABLET ORAL
Refills: 0 | Status: ACTIVE | COMMUNITY

## 2025-05-13 RX ORDER — FLUTICASONE FUROATE, UMECLIDINIUM BROMIDE AND VILANTEROL TRIFENATATE 100; 62.5; 25 UG/1; UG/1; UG/1
100-62.5-25 POWDER RESPIRATORY (INHALATION)
Refills: 0 | Status: ACTIVE | COMMUNITY

## 2025-05-13 RX ORDER — FERROUS SULFATE TAB 325 MG (65 MG ELEMENTAL FE) 325 (65 FE) MG
325 (65 FE) TAB ORAL
Refills: 0 | Status: ACTIVE | COMMUNITY

## 2025-05-13 RX ORDER — LEVOTHYROXINE SODIUM 0.07 MG/1
75 TABLET ORAL
Refills: 0 | Status: ACTIVE | COMMUNITY

## 2025-05-13 RX ORDER — QUETIAPINE 50 MG/1
50 TABLET, FILM COATED ORAL
Refills: 0 | Status: ACTIVE | COMMUNITY

## 2025-05-13 RX ORDER — SERTRALINE HYDROCHLORIDE 100 MG/1
100 TABLET, FILM COATED ORAL
Refills: 0 | Status: ACTIVE | COMMUNITY

## 2025-05-13 RX ORDER — FOLIC ACID 1 MG/1
1 TABLET ORAL
Refills: 0 | Status: ACTIVE | COMMUNITY

## 2025-05-13 RX ORDER — DILTIAZEM HYDROCHLORIDE 300 MG/1
300 CAPSULE, COATED, EXTENDED RELEASE ORAL
Refills: 0 | Status: ACTIVE | COMMUNITY

## 2025-05-13 RX ORDER — POTASSIUM CHLORIDE 750 MG/1
10 TABLET, FILM COATED, EXTENDED RELEASE ORAL
Refills: 0 | Status: ACTIVE | COMMUNITY

## 2025-05-13 RX ORDER — CEFUROXIME AXETIL 500 MG/1
500 TABLET, FILM COATED ORAL
Refills: 0 | Status: ACTIVE | COMMUNITY

## 2025-05-20 ENCOUNTER — OUTPATIENT (OUTPATIENT)
Dept: OUTPATIENT SERVICES | Facility: HOSPITAL | Age: 89
LOS: 1 days | End: 2025-05-20
Payer: MEDICARE

## 2025-05-20 ENCOUNTER — APPOINTMENT (OUTPATIENT)
Dept: WOUND CARE | Facility: HOSPITAL | Age: 89
End: 2025-05-20
Payer: MEDICARE

## 2025-05-20 VITALS
SYSTOLIC BLOOD PRESSURE: 131 MMHG | TEMPERATURE: 98 F | HEIGHT: 65 IN | HEART RATE: 75 BPM | OXYGEN SATURATION: 94 % | RESPIRATION RATE: 16 BRPM | WEIGHT: 165 LBS | DIASTOLIC BLOOD PRESSURE: 90 MMHG | BODY MASS INDEX: 27.49 KG/M2

## 2025-05-20 DIAGNOSIS — Z98.89 OTHER SPECIFIED POSTPROCEDURAL STATES: Chronic | ICD-10-CM

## 2025-05-20 DIAGNOSIS — L97.801 NON-PRESSURE CHRONIC ULCER OF OTHER PART OF UNSPECIFIED LOWER LEG LIMITED TO BREAKDOWN OF SKIN: ICD-10-CM

## 2025-05-20 DIAGNOSIS — Z90.721 ACQUIRED ABSENCE OF OVARIES, UNILATERAL: Chronic | ICD-10-CM

## 2025-05-20 DIAGNOSIS — Z96.651 PRESENCE OF RIGHT ARTIFICIAL KNEE JOINT: Chronic | ICD-10-CM

## 2025-05-20 DIAGNOSIS — Z96.641 PRESENCE OF RIGHT ARTIFICIAL HIP JOINT: Chronic | ICD-10-CM

## 2025-05-20 DIAGNOSIS — R19.00 INTRA-ABDOMINAL AND PELVIC SWELLING, MASS AND LUMP, UNSPECIFIED SITE: Chronic | ICD-10-CM

## 2025-05-20 DIAGNOSIS — H26.493 OTHER SECONDARY CATARACT, BILATERAL: Chronic | ICD-10-CM

## 2025-05-20 PROCEDURE — G0463: CPT

## 2025-05-20 PROCEDURE — 99213 OFFICE O/P EST LOW 20 MIN: CPT

## 2025-05-21 DIAGNOSIS — Z87.39 PERSONAL HISTORY OF OTHER DISEASES OF THE MUSCULOSKELETAL SYSTEM AND CONNECTIVE TISSUE: ICD-10-CM

## 2025-05-21 DIAGNOSIS — Z98.890 OTHER SPECIFIED POSTPROCEDURAL STATES: ICD-10-CM

## 2025-05-21 DIAGNOSIS — Z80.9 FAMILY HISTORY OF MALIGNANT NEOPLASM, UNSPECIFIED: ICD-10-CM

## 2025-05-21 DIAGNOSIS — Z79.899 OTHER LONG TERM (CURRENT) DRUG THERAPY: ICD-10-CM

## 2025-05-21 DIAGNOSIS — Z90.722 ACQUIRED ABSENCE OF OVARIES, BILATERAL: ICD-10-CM

## 2025-05-21 DIAGNOSIS — M81.0 AGE-RELATED OSTEOPOROSIS WITHOUT CURRENT PATHOLOGICAL FRACTURE: ICD-10-CM

## 2025-05-21 DIAGNOSIS — Z85.831 PERSONAL HISTORY OF MALIGNANT NEOPLASM OF SOFT TISSUE: ICD-10-CM

## 2025-05-21 DIAGNOSIS — Z82.61 FAMILY HISTORY OF ARTHRITIS: ICD-10-CM
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
## 2025-05-21 DIAGNOSIS — Z82.62 FAMILY HISTORY OF OSTEOPOROSIS: ICD-10-CM

## 2025-05-21 DIAGNOSIS — S81.812D LACERATION WITHOUT FOREIGN BODY, LEFT LOWER LEG, SUBSEQUENT ENCOUNTER: ICD-10-CM

## 2025-05-21 DIAGNOSIS — K21.9 GASTRO-ESOPHAGEAL REFLUX DISEASE WITHOUT ESOPHAGITIS: ICD-10-CM

## 2025-05-21 DIAGNOSIS — R60.0 LOCALIZED EDEMA: ICD-10-CM

## 2025-05-21 DIAGNOSIS — Z96.641 PRESENCE OF RIGHT ARTIFICIAL HIP JOINT: ICD-10-CM
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
## 2025-05-21 DIAGNOSIS — I48.0 PAROXYSMAL ATRIAL FIBRILLATION: ICD-10-CM
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
## 2025-05-21 DIAGNOSIS — Z88.1 ALLERGY STATUS TO OTHER ANTIBIOTIC AGENTS: ICD-10-CM

## 2025-05-23 ENCOUNTER — OUTPATIENT (OUTPATIENT)
Dept: OUTPATIENT SERVICES | Facility: HOSPITAL | Age: 89
LOS: 1 days | End: 2025-05-23
Payer: MEDICARE

## 2025-05-23 ENCOUNTER — APPOINTMENT (OUTPATIENT)
Dept: WOUND CARE | Facility: HOSPITAL | Age: 89
End: 2025-05-23
Payer: MEDICARE

## 2025-05-23 VITALS
TEMPERATURE: 98.4 F | HEART RATE: 68 BPM | RESPIRATION RATE: 18 BRPM | BODY MASS INDEX: 27.49 KG/M2 | OXYGEN SATURATION: 93 % | HEIGHT: 65 IN | SYSTOLIC BLOOD PRESSURE: 150 MMHG | WEIGHT: 165 LBS | DIASTOLIC BLOOD PRESSURE: 79 MMHG

## 2025-05-23 DIAGNOSIS — Z96.641 PRESENCE OF RIGHT ARTIFICIAL HIP JOINT: Chronic | ICD-10-CM

## 2025-05-23 DIAGNOSIS — Z90.721 ACQUIRED ABSENCE OF OVARIES, UNILATERAL: Chronic | ICD-10-CM

## 2025-05-23 DIAGNOSIS — S81.812D LACERATION WITHOUT FOREIGN BODY, LEFT LOWER LEG, SUBSEQUENT ENCOUNTER: ICD-10-CM

## 2025-05-23 DIAGNOSIS — R19.00 INTRA-ABDOMINAL AND PELVIC SWELLING, MASS AND LUMP, UNSPECIFIED SITE: Chronic | ICD-10-CM

## 2025-05-23 DIAGNOSIS — Z96.651 PRESENCE OF RIGHT ARTIFICIAL KNEE JOINT: Chronic | ICD-10-CM

## 2025-05-23 DIAGNOSIS — H26.493 OTHER SECONDARY CATARACT, BILATERAL: Chronic | ICD-10-CM

## 2025-05-23 DIAGNOSIS — Z98.89 OTHER SPECIFIED POSTPROCEDURAL STATES: Chronic | ICD-10-CM

## 2025-05-23 PROBLEM — R60.0 EDEMA OF BOTH LOWER EXTREMITIES: Status: ACTIVE | Noted: 2025-05-13

## 2025-05-23 PROCEDURE — 99213 OFFICE O/P EST LOW 20 MIN: CPT

## 2025-05-23 PROCEDURE — G0463: CPT

## 2025-05-24 DIAGNOSIS — Z98.890 OTHER SPECIFIED POSTPROCEDURAL STATES: ICD-10-CM

## 2025-05-24 DIAGNOSIS — Z88.1 ALLERGY STATUS TO OTHER ANTIBIOTIC AGENTS: ICD-10-CM

## 2025-05-24 DIAGNOSIS — I48.0 PAROXYSMAL ATRIAL FIBRILLATION: ICD-10-CM

## 2025-05-24 DIAGNOSIS — Z79.899 OTHER LONG TERM (CURRENT) DRUG THERAPY: ICD-10-CM
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
## 2025-05-24 DIAGNOSIS — Z85.831 PERSONAL HISTORY OF MALIGNANT NEOPLASM OF SOFT TISSUE: ICD-10-CM

## 2025-05-24 DIAGNOSIS — K21.9 GASTRO-ESOPHAGEAL REFLUX DISEASE WITHOUT ESOPHAGITIS: ICD-10-CM

## 2025-05-24 DIAGNOSIS — Z96.641 PRESENCE OF RIGHT ARTIFICIAL HIP JOINT: ICD-10-CM

## 2025-05-24 DIAGNOSIS — M81.0 AGE-RELATED OSTEOPOROSIS WITHOUT CURRENT PATHOLOGICAL FRACTURE: ICD-10-CM

## 2025-05-24 DIAGNOSIS — Z87.39 PERSONAL HISTORY OF OTHER DISEASES OF THE MUSCULOSKELETAL SYSTEM AND CONNECTIVE TISSUE: ICD-10-CM
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
## 2025-05-24 DIAGNOSIS — Z80.9 FAMILY HISTORY OF MALIGNANT NEOPLASM, UNSPECIFIED: ICD-10-CM

## 2025-05-24 DIAGNOSIS — Z82.61 FAMILY HISTORY OF ARTHRITIS: ICD-10-CM
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
## 2025-05-24 DIAGNOSIS — S81.812D LACERATION WITHOUT FOREIGN BODY, LEFT LOWER LEG, SUBSEQUENT ENCOUNTER: ICD-10-CM

## 2025-05-24 DIAGNOSIS — Z82.62 FAMILY HISTORY OF OSTEOPOROSIS: ICD-10-CM

## 2025-05-24 DIAGNOSIS — R60.0 LOCALIZED EDEMA: ICD-10-CM

## 2025-05-24 DIAGNOSIS — Z90.722 ACQUIRED ABSENCE OF OVARIES, BILATERAL: ICD-10-CM
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
## 2025-05-25 NOTE — CAREGIVER ENGAGEMENT NOTE - CAREGIVER EDUCATION - TYPES DISCUSSED
Postpartum Note-  Section POD#1    Prenatal Labs  Blood type: A Positive  Rubella IgG: IMMune  RPR: Negative      S:Patient w/o complaints, pain is controlled.  Pt is OOB, tolerating PO, passing flatus and voiding.  Solomon WNL.     O:  Vital Signs Last 24 Hrs  T(C): 36.6 (23 May 2025 05:44), Max: 36.8 (22 May 2025 13:46)  T(F): 97.8 (23 May 2025 05:44), Max: 98.24 (22 May 2025 13:46)  HR: 86 (23 May 2025 05:44) (86 - 118)  BP: 104/69 (23 May 2025 05:44) (89/51 - 109/58)  BP(mean): 73 (22 May 2025 20:30) (65 - 80)  RR: 17 (23 May 2025 06:15) (16 - 20)  SpO2: 97% (23 May 2025 05:44) (95% - 100%)    Parameters below as of 23 May 2025 05:44  Patient On (Oxygen Delivery Method): room air      I&O's Summary    22 May 2025 07:01  -  23 May 2025 07:00  --------------------------------------------------------  IN: 2900 mL / OUT: 2643 mL / NET: 257 mL        Gen: NAD  Abdomen: Soft, appropriate tenderness, non-distended, fundus firm.  Incision: Clean/dry/ intact.  Neg erythema,  Neg ecchymosis .  Sub Q-prineo    Solomon WNL  Ext: Soft/NT B/L,   Neg Edema    LABS:             11.1   16.22 )-----------( 147      (  @ 06:28 )             32.8                12.4   17.24 )-----------( 132      (  @ 19:56 )             35.3               
Day __1_ of Anesthesia Pain Management Service    SUBJECTIVE:  Pain Scale Score	At rest: ___ 	With Activity: ___ 	[x ] Refer to charted pain scores    THERAPY:    s/p _____.1___ mg PF morphine on _5/22_____      MEDICATIONS  (STANDING):  acetaminophen     Tablet .. 975 milliGRAM(s) Oral <User Schedule>  clindamycin IVPB 900 milliGRAM(s) IV Intermittent once  clindamycin IVPB 900 milliGRAM(s) IV Intermittent every 8 hours  diphenhydrAMINE Injectable 25 milliGRAM(s) IV Push once  diphtheria/tetanus/pertussis (acellular) Vaccine (Adacel) 0.5 milliLiter(s) IntraMuscular once  gentamicin   IVPB 400 milliGRAM(s) IV Intermittent once  heparin   Injectable 5000 Unit(s) SubCutaneous two times a day  ibuprofen  Tablet. 600 milliGRAM(s) Oral every 6 hours  ketorolac   Injectable 30 milliGRAM(s) IV Push every 6 hours  lactated ringers. 1000 milliLiter(s) (125 mL/Hr) IV Continuous <Continuous>  levothyroxine 50 MICROGram(s) Oral daily  morphine PF Spinal 0.1 milliGRAM(s) IntraThecal. once  oxytocin Infusion 42 milliUNIT(s)/Min (42 mL/Hr) IV Continuous <Continuous>    MEDICATIONS  (PRN):  dexAMETHasone  Injectable 4 milliGRAM(s) IV Push every 6 hours PRN Nausea  diphenhydrAMINE 25 milliGRAM(s) Oral every 6 hours PRN Pruritus  lanolin Ointment 1 Application(s) Topical every 6 hours PRN Sore Nipples  magnesium hydroxide Suspension 30 milliLiter(s) Oral two times a day PRN Constipation  nalbuphine Injectable 2.5 milliGRAM(s) IV Push every 6 hours PRN Pruritus  naloxone Injectable 0.1 milliGRAM(s) IV Push every 3 minutes PRN For ANY of the following changes in patient status:  A. Breaths Per Minute LESS THAN 10, B. Oxygen saturation LESS THAN 90%, C. Sedation score of 6 for Stop After: 4 Times  ondansetron Injectable 4 milliGRAM(s) IV Push every 6 hours PRN Nausea  oxyCODONE    IR 5 milliGRAM(s) Oral every 3 hours PRN Mild Pain (1 - 3)  oxyCODONE    IR 10 milliGRAM(s) Oral every 3 hours PRN Moderate Pain (4 - 6)  oxyCODONE    IR 5 milliGRAM(s) Oral every 3 hours PRN Moderate to Severe Pain (4-10)  oxyCODONE    IR 5 milliGRAM(s) Oral once PRN Moderate to Severe Pain (4-10)  simethicone 80 milliGRAM(s) Chew every 4 hours PRN Gas      OBJECTIVE:    Sedation Score:	[x ] Alert	[ ] Drowsy	[ ] Arousable	[ ] Asleep	[ ] Unresponsive    Side Effects:	[x ] None	[ ] Nausea	[ ] Vomiting	[ ] Pruritus  		  [ ] Weakness		[ ] Numbness	[ ] Other:    Vital Signs Last 24 Hrs  T(C): 36.6 (23 May 2025 05:44), Max: 36.8 (22 May 2025 13:46)  T(F): 97.8 (23 May 2025 05:44), Max: 98.24 (22 May 2025 13:46)  HR: 86 (23 May 2025 05:44) (86 - 118)  BP: 104/69 (23 May 2025 05:44) (89/51 - 109/58)  BP(mean): 73 (22 May 2025 20:30) (65 - 80)  RR: 17 (23 May 2025 06:15) (16 - 20)  SpO2: 97% (23 May 2025 05:44) (95% - 100%)    Parameters below as of 23 May 2025 05:44  Patient On (Oxygen Delivery Method): room air        ASSESSMENT/ PLAN  [ ] Patient transitioned to prn analgesics  [x ] Pain management per primary service, pain service to sign off   [x ]Documentation and Verification of current medications     Comments:
Postpartum Note-  Section POD#2    Prenatal Labs  Blood type: A Positive  Rubella IgG: IMMune  RPR: Negative    S:Patient w/o complaints, pain is controlled.  Pt is OOB, tolerating PO, passing flatus and voiding.  Lochia WNL.     O:  Vital Signs Last 24 Hrs  T(C): 36.6 (24 May 2025 06:17), Max: 36.7 (23 May 2025 21:00)  T(F): 97.8 (24 May 2025 06:17), Max: 98.1 (23 May 2025 21:00)  HR: 78 (24 May 2025 06:17) (78 - 99)  BP: 93/57 (24 May 2025 06:17) (93/57 - 116/75)  BP(mean): --  RR: 18 (24 May 2025 06:17) (18 - 18)  SpO2: 95% (24 May 2025 06:17) (95% - 100%)    Gen: NAD  Abdomen: Soft, appropriate tenderness, non-distended, fundus firm.  Incision: Clean/dry/ intact.  Neg erythema,  Neg ecchymosis .  Sub Q/prineo    Lochia WNL  Ext: negative edema, negative tenderness    LABS:             11.1   16.22 )-----------( 147      (  @ 06:28 )             32.8                12.4   17.24 )-----------( 132      (  @ 19:56 )             35.3               
PA POD # 3  C/S Note    Blood Type:  A+            Rubella:  Immune                RPR:  NR    Pain:  Controlled  Complaints:  None  Pt. is ambulating, Voiding, passing flatus, & tolerating regular diet.  Lochia:  WNL    VS:  T(C): 36.6 (05-25-25 @ 05:57), Max: 36.7 (05-24-25 @ 17:04)  HR: 94 (05-25-25 @ 05:57) (81 - 94)  BP: 111/73 (05-25-25 @ 05:57) (106/70 - 112/68)  RR: 18 (05-25-25 @ 05:57) (18 - 18)  SpO2: 97% (05-25-25 @ 05:57) (97% - 98%)    Abd:  Soft, non-distended, non-tender            Fundus-firm            Incision- C/D/I-SQ with Prineo  Extremities:  Edema - none                     Calf Tenderness - none    Assessment:    31 y.o. G7  P 3043      POD # 3  S/P Primary C/S for h/o cornual ectopic & salpingectomy,  in stable condition.    PMHx significant for:  PCOS, Cornual Ectopic & Salpingectomy  Current Issues:  None  Plan:  Increase OOB             Cont. Pain Protocol             Cont. Reg. Diet             Cont. PO Care.            Cont. DVT PPx              [facility]
Resumption of home PT and 24/7 private hire aide
no

## 2025-05-29 ENCOUNTER — OUTPATIENT (OUTPATIENT)
Dept: OUTPATIENT SERVICES | Facility: HOSPITAL | Age: 89
LOS: 1 days | End: 2025-05-29
Payer: MEDICARE

## 2025-05-29 ENCOUNTER — APPOINTMENT (OUTPATIENT)
Dept: WOUND CARE | Facility: HOSPITAL | Age: 89
End: 2025-05-29
Payer: MEDICARE

## 2025-05-29 VITALS
RESPIRATION RATE: 14 BRPM | HEART RATE: 63 BPM | SYSTOLIC BLOOD PRESSURE: 132 MMHG | HEIGHT: 65 IN | DIASTOLIC BLOOD PRESSURE: 71 MMHG | TEMPERATURE: 99.4 F | OXYGEN SATURATION: 95 % | WEIGHT: 165 LBS | BODY MASS INDEX: 27.49 KG/M2

## 2025-05-29 DIAGNOSIS — L97.801 NON-PRESSURE CHRONIC ULCER OF OTHER PART OF UNSPECIFIED LOWER LEG LIMITED TO BREAKDOWN OF SKIN: ICD-10-CM

## 2025-05-29 DIAGNOSIS — Z80.9 FAMILY HISTORY OF MALIGNANT NEOPLASM, UNSPECIFIED: ICD-10-CM

## 2025-05-29 DIAGNOSIS — R60.0 LOCALIZED EDEMA: ICD-10-CM
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
## 2025-05-29 DIAGNOSIS — S81.812D LACERATION WITHOUT FOREIGN BODY, LEFT LOWER LEG, SUBSEQUENT ENCOUNTER: ICD-10-CM

## 2025-05-29 DIAGNOSIS — R19.00 INTRA-ABDOMINAL AND PELVIC SWELLING, MASS AND LUMP, UNSPECIFIED SITE: Chronic | ICD-10-CM

## 2025-05-29 DIAGNOSIS — Z90.721 ACQUIRED ABSENCE OF OVARIES, UNILATERAL: Chronic | ICD-10-CM
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
## 2025-05-29 DIAGNOSIS — Z82.62 FAMILY HISTORY OF OSTEOPOROSIS: ICD-10-CM

## 2025-05-29 DIAGNOSIS — Z98.890 OTHER SPECIFIED POSTPROCEDURAL STATES: ICD-10-CM

## 2025-05-29 DIAGNOSIS — S81.812D LACERATION W/OUT FOREIGN BODY, LEFT LOWER LEG, SUBSEQUENT ENCOUNTER: ICD-10-CM

## 2025-05-29 DIAGNOSIS — I48.0 PAROXYSMAL ATRIAL FIBRILLATION: ICD-10-CM

## 2025-05-29 DIAGNOSIS — H26.493 OTHER SECONDARY CATARACT, BILATERAL: Chronic | ICD-10-CM

## 2025-05-29 DIAGNOSIS — M81.0 AGE-RELATED OSTEOPOROSIS WITHOUT CURRENT PATHOLOGICAL FRACTURE: ICD-10-CM

## 2025-05-29 DIAGNOSIS — Z98.89 OTHER SPECIFIED POSTPROCEDURAL STATES: Chronic | ICD-10-CM

## 2025-05-29 DIAGNOSIS — Z96.641 PRESENCE OF RIGHT ARTIFICIAL HIP JOINT: ICD-10-CM

## 2025-05-29 DIAGNOSIS — K21.9 GASTRO-ESOPHAGEAL REFLUX DISEASE WITHOUT ESOPHAGITIS: ICD-10-CM

## 2025-05-29 DIAGNOSIS — Z96.641 PRESENCE OF RIGHT ARTIFICIAL HIP JOINT: Chronic | ICD-10-CM

## 2025-05-29 DIAGNOSIS — Z82.61 FAMILY HISTORY OF ARTHRITIS: ICD-10-CM
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
## 2025-05-29 DIAGNOSIS — Z90.722 ACQUIRED ABSENCE OF OVARIES, BILATERAL: ICD-10-CM

## 2025-05-29 DIAGNOSIS — Z96.651 PRESENCE OF RIGHT ARTIFICIAL KNEE JOINT: Chronic | ICD-10-CM

## 2025-05-29 DIAGNOSIS — Z79.899 OTHER LONG TERM (CURRENT) DRUG THERAPY: ICD-10-CM

## 2025-05-29 DIAGNOSIS — Z87.39 PERSONAL HISTORY OF OTHER DISEASES OF THE MUSCULOSKELETAL SYSTEM AND CONNECTIVE TISSUE: ICD-10-CM

## 2025-05-29 DIAGNOSIS — Z88.1 ALLERGY STATUS TO OTHER ANTIBIOTIC AGENTS: ICD-10-CM

## 2025-05-29 DIAGNOSIS — Z85.831 PERSONAL HISTORY OF MALIGNANT NEOPLASM OF SOFT TISSUE: ICD-10-CM

## 2025-05-29 PROCEDURE — 99213 OFFICE O/P EST LOW 20 MIN: CPT

## 2025-05-29 PROCEDURE — G0463: CPT

## 2025-06-02 ENCOUNTER — NON-APPOINTMENT (OUTPATIENT)
Age: 89
End: 2025-06-02

## 2025-06-05 ENCOUNTER — APPOINTMENT (OUTPATIENT)
Dept: ORTHOPEDIC SURGERY | Facility: CLINIC | Age: 89
End: 2025-06-05
Payer: MEDICARE

## 2025-06-05 DIAGNOSIS — M17.0 BILATERAL PRIMARY OSTEOARTHRITIS OF KNEE: ICD-10-CM

## 2025-06-05 PROCEDURE — 73564 X-RAY EXAM KNEE 4 OR MORE: CPT | Mod: 50

## 2025-06-05 PROCEDURE — 20610 DRAIN/INJ JOINT/BURSA W/O US: CPT | Mod: RT

## 2025-06-05 PROCEDURE — 99214 OFFICE O/P EST MOD 30 MIN: CPT | Mod: 25

## 2025-06-12 ENCOUNTER — APPOINTMENT (OUTPATIENT)
Dept: WOUND CARE | Facility: HOSPITAL | Age: 89
End: 2025-06-12
Payer: MEDICARE

## 2025-06-12 ENCOUNTER — OUTPATIENT (OUTPATIENT)
Dept: OUTPATIENT SERVICES | Facility: HOSPITAL | Age: 89
LOS: 1 days | End: 2025-06-12
Payer: MEDICARE

## 2025-06-12 VITALS
DIASTOLIC BLOOD PRESSURE: 72 MMHG | BODY MASS INDEX: 27.49 KG/M2 | TEMPERATURE: 98.6 F | HEART RATE: 87 BPM | WEIGHT: 165 LBS | SYSTOLIC BLOOD PRESSURE: 141 MMHG | HEIGHT: 65 IN | RESPIRATION RATE: 14 BRPM | OXYGEN SATURATION: 93 %

## 2025-06-12 DIAGNOSIS — I48.0 PAROXYSMAL ATRIAL FIBRILLATION: ICD-10-CM

## 2025-06-12 DIAGNOSIS — Z79.899 OTHER LONG TERM (CURRENT) DRUG THERAPY: ICD-10-CM

## 2025-06-12 DIAGNOSIS — Z88.1 ALLERGY STATUS TO OTHER ANTIBIOTIC AGENTS: ICD-10-CM

## 2025-06-12 DIAGNOSIS — Z96.641 PRESENCE OF RIGHT ARTIFICIAL HIP JOINT: Chronic | ICD-10-CM

## 2025-06-12 DIAGNOSIS — Z98.89 OTHER SPECIFIED POSTPROCEDURAL STATES: Chronic | ICD-10-CM

## 2025-06-12 DIAGNOSIS — Z82.62 FAMILY HISTORY OF OSTEOPOROSIS: ICD-10-CM

## 2025-06-12 DIAGNOSIS — M81.0 AGE-RELATED OSTEOPOROSIS WITHOUT CURRENT PATHOLOGICAL FRACTURE: ICD-10-CM

## 2025-06-12 DIAGNOSIS — Z96.651 PRESENCE OF RIGHT ARTIFICIAL KNEE JOINT: Chronic | ICD-10-CM

## 2025-06-12 DIAGNOSIS — Z96.641 PRESENCE OF RIGHT ARTIFICIAL HIP JOINT: ICD-10-CM

## 2025-06-12 DIAGNOSIS — Z90.722 ACQUIRED ABSENCE OF OVARIES, BILATERAL: ICD-10-CM

## 2025-06-12 DIAGNOSIS — Z82.61 FAMILY HISTORY OF ARTHRITIS: ICD-10-CM

## 2025-06-12 DIAGNOSIS — K21.9 GASTRO-ESOPHAGEAL REFLUX DISEASE WITHOUT ESOPHAGITIS: ICD-10-CM
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
## 2025-06-12 DIAGNOSIS — H26.493 OTHER SECONDARY CATARACT, BILATERAL: Chronic | ICD-10-CM

## 2025-06-12 DIAGNOSIS — Z85.831 PERSONAL HISTORY OF MALIGNANT NEOPLASM OF SOFT TISSUE: ICD-10-CM

## 2025-06-12 DIAGNOSIS — R19.00 INTRA-ABDOMINAL AND PELVIC SWELLING, MASS AND LUMP, UNSPECIFIED SITE: Chronic | ICD-10-CM
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
## 2025-06-12 DIAGNOSIS — Z90.721 ACQUIRED ABSENCE OF OVARIES, UNILATERAL: Chronic | ICD-10-CM

## 2025-06-12 DIAGNOSIS — S81.812D LACERATION WITHOUT FOREIGN BODY, LEFT LOWER LEG, SUBSEQUENT ENCOUNTER: ICD-10-CM

## 2025-06-12 DIAGNOSIS — Z98.890 OTHER SPECIFIED POSTPROCEDURAL STATES: ICD-10-CM

## 2025-06-12 DIAGNOSIS — Z80.9 FAMILY HISTORY OF MALIGNANT NEOPLASM, UNSPECIFIED: ICD-10-CM
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
## 2025-06-12 DIAGNOSIS — R60.0 LOCALIZED EDEMA: ICD-10-CM

## 2025-06-12 DIAGNOSIS — Z87.39 PERSONAL HISTORY OF OTHER DISEASES OF THE MUSCULOSKELETAL SYSTEM AND CONNECTIVE TISSUE: ICD-10-CM

## 2025-06-12 DIAGNOSIS — L97.801 NON-PRESSURE CHRONIC ULCER OF OTHER PART OF UNSPECIFIED LOWER LEG LIMITED TO BREAKDOWN OF SKIN: ICD-10-CM

## 2025-06-12 PROCEDURE — G0463: CPT

## 2025-06-12 PROCEDURE — 99213 OFFICE O/P EST LOW 20 MIN: CPT

## 2025-06-19 ENCOUNTER — APPOINTMENT (OUTPATIENT)
Dept: ORTHOPEDIC SURGERY | Facility: CLINIC | Age: 89
End: 2025-06-19

## 2025-06-26 ENCOUNTER — APPOINTMENT (OUTPATIENT)
Dept: WOUND CARE | Facility: HOSPITAL | Age: 89
End: 2025-06-26
Payer: MEDICARE

## 2025-06-26 ENCOUNTER — OUTPATIENT (OUTPATIENT)
Dept: OUTPATIENT SERVICES | Facility: HOSPITAL | Age: 89
LOS: 1 days | End: 2025-06-26
Payer: MEDICARE

## 2025-06-26 VITALS
SYSTOLIC BLOOD PRESSURE: 123 MMHG | DIASTOLIC BLOOD PRESSURE: 67 MMHG | HEIGHT: 65 IN | WEIGHT: 165 LBS | TEMPERATURE: 97.6 F | BODY MASS INDEX: 27.49 KG/M2 | OXYGEN SATURATION: 95 % | RESPIRATION RATE: 16 BRPM | HEART RATE: 66 BPM

## 2025-06-26 DIAGNOSIS — Z90.721 ACQUIRED ABSENCE OF OVARIES, UNILATERAL: Chronic | ICD-10-CM

## 2025-06-26 DIAGNOSIS — Z96.651 PRESENCE OF RIGHT ARTIFICIAL KNEE JOINT: Chronic | ICD-10-CM

## 2025-06-26 DIAGNOSIS — Z98.89 OTHER SPECIFIED POSTPROCEDURAL STATES: Chronic | ICD-10-CM

## 2025-06-26 DIAGNOSIS — R19.00 INTRA-ABDOMINAL AND PELVIC SWELLING, MASS AND LUMP, UNSPECIFIED SITE: Chronic | ICD-10-CM

## 2025-06-26 DIAGNOSIS — H26.493 OTHER SECONDARY CATARACT, BILATERAL: Chronic | ICD-10-CM

## 2025-06-26 DIAGNOSIS — Z96.641 PRESENCE OF RIGHT ARTIFICIAL HIP JOINT: Chronic | ICD-10-CM

## 2025-06-26 DIAGNOSIS — L97.801 NON-PRESSURE CHRONIC ULCER OF OTHER PART OF UNSPECIFIED LOWER LEG LIMITED TO BREAKDOWN OF SKIN: ICD-10-CM

## 2025-06-26 PROCEDURE — G0463: CPT

## 2025-06-26 PROCEDURE — 87070 CULTURE OTHR SPECIMN AEROBIC: CPT

## 2025-06-26 PROCEDURE — 87077 CULTURE AEROBIC IDENTIFY: CPT

## 2025-06-26 PROCEDURE — 99203 OFFICE O/P NEW LOW 30 MIN: CPT

## 2025-06-29 LAB
CULTURE RESULTS: ABNORMAL
SPECIMEN SOURCE: SIGNIFICANT CHANGE UP

## 2025-06-30 DIAGNOSIS — K21.9 GASTRO-ESOPHAGEAL REFLUX DISEASE WITHOUT ESOPHAGITIS: ICD-10-CM

## 2025-06-30 DIAGNOSIS — M81.0 AGE-RELATED OSTEOPOROSIS WITHOUT CURRENT PATHOLOGICAL FRACTURE: ICD-10-CM

## 2025-06-30 DIAGNOSIS — S81.812D LACERATION WITHOUT FOREIGN BODY, LEFT LOWER LEG, SUBSEQUENT ENCOUNTER: ICD-10-CM

## 2025-06-30 DIAGNOSIS — Z88.1 ALLERGY STATUS TO OTHER ANTIBIOTIC AGENTS: ICD-10-CM
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
## 2025-06-30 DIAGNOSIS — Z87.39 PERSONAL HISTORY OF OTHER DISEASES OF THE MUSCULOSKELETAL SYSTEM AND CONNECTIVE TISSUE: ICD-10-CM

## 2025-06-30 DIAGNOSIS — Z85.831 PERSONAL HISTORY OF MALIGNANT NEOPLASM OF SOFT TISSUE: ICD-10-CM
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
## 2025-06-30 DIAGNOSIS — Z79.899 OTHER LONG TERM (CURRENT) DRUG THERAPY: ICD-10-CM

## 2025-06-30 DIAGNOSIS — Z90.722 ACQUIRED ABSENCE OF OVARIES, BILATERAL: ICD-10-CM

## 2025-06-30 DIAGNOSIS — Z82.62 FAMILY HISTORY OF OSTEOPOROSIS: ICD-10-CM

## 2025-06-30 DIAGNOSIS — Z96.641 PRESENCE OF RIGHT ARTIFICIAL HIP JOINT: ICD-10-CM

## 2025-06-30 DIAGNOSIS — Z82.61 FAMILY HISTORY OF ARTHRITIS: ICD-10-CM
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
## 2025-06-30 DIAGNOSIS — Z80.9 FAMILY HISTORY OF MALIGNANT NEOPLASM, UNSPECIFIED: ICD-10-CM

## 2025-06-30 DIAGNOSIS — I48.0 PAROXYSMAL ATRIAL FIBRILLATION: ICD-10-CM

## 2025-06-30 DIAGNOSIS — Z98.890 OTHER SPECIFIED POSTPROCEDURAL STATES: ICD-10-CM

## 2025-07-10 ENCOUNTER — OUTPATIENT (OUTPATIENT)
Dept: OUTPATIENT SERVICES | Facility: HOSPITAL | Age: 89
LOS: 1 days | End: 2025-07-10
Payer: MEDICARE

## 2025-07-10 ENCOUNTER — APPOINTMENT (OUTPATIENT)
Dept: WOUND CARE | Facility: HOSPITAL | Age: 89
End: 2025-07-10

## 2025-07-10 ENCOUNTER — APPOINTMENT (OUTPATIENT)
Dept: WOUND CARE | Facility: HOSPITAL | Age: 89
End: 2025-07-10
Payer: MEDICARE

## 2025-07-10 VITALS
HEIGHT: 65 IN | WEIGHT: 165 LBS | SYSTOLIC BLOOD PRESSURE: 122 MMHG | RESPIRATION RATE: 15 BRPM | OXYGEN SATURATION: 93 % | BODY MASS INDEX: 27.49 KG/M2 | DIASTOLIC BLOOD PRESSURE: 77 MMHG | TEMPERATURE: 98 F | HEART RATE: 62 BPM

## 2025-07-10 DIAGNOSIS — H26.493 OTHER SECONDARY CATARACT, BILATERAL: Chronic | ICD-10-CM

## 2025-07-10 DIAGNOSIS — R19.00 INTRA-ABDOMINAL AND PELVIC SWELLING, MASS AND LUMP, UNSPECIFIED SITE: Chronic | ICD-10-CM

## 2025-07-10 DIAGNOSIS — Z98.89 OTHER SPECIFIED POSTPROCEDURAL STATES: Chronic | ICD-10-CM

## 2025-07-10 DIAGNOSIS — L97.801 NON-PRESSURE CHRONIC ULCER OF OTHER PART OF UNSPECIFIED LOWER LEG LIMITED TO BREAKDOWN OF SKIN: ICD-10-CM

## 2025-07-10 DIAGNOSIS — Z96.651 PRESENCE OF RIGHT ARTIFICIAL KNEE JOINT: Chronic | ICD-10-CM

## 2025-07-10 DIAGNOSIS — Z90.721 ACQUIRED ABSENCE OF OVARIES, UNILATERAL: Chronic | ICD-10-CM

## 2025-07-10 DIAGNOSIS — Z96.641 PRESENCE OF RIGHT ARTIFICIAL HIP JOINT: Chronic | ICD-10-CM

## 2025-07-10 PROCEDURE — G0463: CPT

## 2025-07-10 PROCEDURE — 99204 OFFICE O/P NEW MOD 45 MIN: CPT

## 2025-07-10 PROCEDURE — 99213 OFFICE O/P EST LOW 20 MIN: CPT

## 2025-07-17 DIAGNOSIS — I48.0 PAROXYSMAL ATRIAL FIBRILLATION: ICD-10-CM

## 2025-07-17 DIAGNOSIS — S81.812D LACERATION WITHOUT FOREIGN BODY, LEFT LOWER LEG, SUBSEQUENT ENCOUNTER: ICD-10-CM

## 2025-07-17 DIAGNOSIS — Z82.62 FAMILY HISTORY OF OSTEOPOROSIS: ICD-10-CM

## 2025-07-17 DIAGNOSIS — K21.9 GASTRO-ESOPHAGEAL REFLUX DISEASE WITHOUT ESOPHAGITIS: ICD-10-CM
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
## 2025-07-17 DIAGNOSIS — Z80.9 FAMILY HISTORY OF MALIGNANT NEOPLASM, UNSPECIFIED: ICD-10-CM
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
## 2025-07-17 DIAGNOSIS — Z81.1 FAMILY HISTORY OF ALCOHOL ABUSE AND DEPENDENCE: ICD-10-CM

## 2025-07-17 DIAGNOSIS — Z90.722 ACQUIRED ABSENCE OF OVARIES, BILATERAL: ICD-10-CM

## 2025-07-17 DIAGNOSIS — L89.891 PRESSURE ULCER OF OTHER SITE, STAGE 1: ICD-10-CM
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
## 2025-07-17 DIAGNOSIS — M81.0 AGE-RELATED OSTEOPOROSIS WITHOUT CURRENT PATHOLOGICAL FRACTURE: ICD-10-CM
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
## 2025-07-17 DIAGNOSIS — Z87.39 PERSONAL HISTORY OF OTHER DISEASES OF THE MUSCULOSKELETAL SYSTEM AND CONNECTIVE TISSUE: ICD-10-CM

## 2025-07-17 DIAGNOSIS — R60.0 LOCALIZED EDEMA: ICD-10-CM

## 2025-07-17 DIAGNOSIS — Z85.831 PERSONAL HISTORY OF MALIGNANT NEOPLASM OF SOFT TISSUE: ICD-10-CM

## 2025-07-17 DIAGNOSIS — Z96.641 PRESENCE OF RIGHT ARTIFICIAL HIP JOINT: ICD-10-CM

## 2025-07-17 DIAGNOSIS — Z79.899 OTHER LONG TERM (CURRENT) DRUG THERAPY: ICD-10-CM

## 2025-07-17 DIAGNOSIS — Z98.890 OTHER SPECIFIED POSTPROCEDURAL STATES: ICD-10-CM

## 2025-07-17 DIAGNOSIS — Z82.61 FAMILY HISTORY OF ARTHRITIS: ICD-10-CM

## 2025-07-24 ENCOUNTER — OUTPATIENT (OUTPATIENT)
Dept: OUTPATIENT SERVICES | Facility: HOSPITAL | Age: 89
LOS: 1 days | End: 2025-07-24
Payer: MEDICARE

## 2025-07-24 ENCOUNTER — APPOINTMENT (OUTPATIENT)
Dept: WOUND CARE | Facility: HOSPITAL | Age: 89
End: 2025-07-24
Payer: MEDICARE

## 2025-07-24 VITALS
RESPIRATION RATE: 17 BRPM | HEIGHT: 65 IN | HEART RATE: 71 BPM | WEIGHT: 165 LBS | DIASTOLIC BLOOD PRESSURE: 69 MMHG | SYSTOLIC BLOOD PRESSURE: 135 MMHG | TEMPERATURE: 98.5 F | OXYGEN SATURATION: 93 % | BODY MASS INDEX: 27.49 KG/M2

## 2025-07-24 DIAGNOSIS — Z98.89 OTHER SPECIFIED POSTPROCEDURAL STATES: Chronic | ICD-10-CM

## 2025-07-24 DIAGNOSIS — Z96.641 PRESENCE OF RIGHT ARTIFICIAL HIP JOINT: Chronic | ICD-10-CM

## 2025-07-24 DIAGNOSIS — Z90.721 ACQUIRED ABSENCE OF OVARIES, UNILATERAL: Chronic | ICD-10-CM

## 2025-07-24 DIAGNOSIS — R19.00 INTRA-ABDOMINAL AND PELVIC SWELLING, MASS AND LUMP, UNSPECIFIED SITE: Chronic | ICD-10-CM

## 2025-07-24 DIAGNOSIS — H26.493 OTHER SECONDARY CATARACT, BILATERAL: Chronic | ICD-10-CM

## 2025-07-24 DIAGNOSIS — L97.801 NON-PRESSURE CHRONIC ULCER OF OTHER PART OF UNSPECIFIED LOWER LEG LIMITED TO BREAKDOWN OF SKIN: ICD-10-CM

## 2025-07-24 PROCEDURE — G0463: CPT

## 2025-07-24 PROCEDURE — 99214 OFFICE O/P EST MOD 30 MIN: CPT

## 2025-07-25 DIAGNOSIS — Z79.899 OTHER LONG TERM (CURRENT) DRUG THERAPY: ICD-10-CM

## 2025-07-25 DIAGNOSIS — Z90.722 ACQUIRED ABSENCE OF OVARIES, BILATERAL: ICD-10-CM
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
## 2025-07-25 DIAGNOSIS — Z85.831 PERSONAL HISTORY OF MALIGNANT NEOPLASM OF SOFT TISSUE: ICD-10-CM

## 2025-07-25 DIAGNOSIS — Z87.39 PERSONAL HISTORY OF OTHER DISEASES OF THE MUSCULOSKELETAL SYSTEM AND CONNECTIVE TISSUE: ICD-10-CM

## 2025-07-25 DIAGNOSIS — Z82.61 FAMILY HISTORY OF ARTHRITIS: ICD-10-CM

## 2025-07-25 DIAGNOSIS — Z96.641 PRESENCE OF RIGHT ARTIFICIAL HIP JOINT: ICD-10-CM
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
## 2025-07-25 DIAGNOSIS — Z98.890 OTHER SPECIFIED POSTPROCEDURAL STATES: ICD-10-CM

## 2025-07-25 DIAGNOSIS — R60.0 LOCALIZED EDEMA: ICD-10-CM
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
## 2025-07-25 DIAGNOSIS — Z88.1 ALLERGY STATUS TO OTHER ANTIBIOTIC AGENTS: ICD-10-CM

## 2025-07-25 DIAGNOSIS — L89.891 PRESSURE ULCER OF OTHER SITE, STAGE 1: ICD-10-CM

## 2025-07-25 DIAGNOSIS — Z82.62 FAMILY HISTORY OF OSTEOPOROSIS: ICD-10-CM

## 2025-07-25 DIAGNOSIS — I48.0 PAROXYSMAL ATRIAL FIBRILLATION: ICD-10-CM

## 2025-07-25 DIAGNOSIS — S81.812D LACERATION WITHOUT FOREIGN BODY, LEFT LOWER LEG, SUBSEQUENT ENCOUNTER: ICD-10-CM

## 2025-07-25 DIAGNOSIS — Z80.9 FAMILY HISTORY OF MALIGNANT NEOPLASM, UNSPECIFIED: ICD-10-CM

## 2025-07-25 DIAGNOSIS — M81.0 AGE-RELATED OSTEOPOROSIS WITHOUT CURRENT PATHOLOGICAL FRACTURE: ICD-10-CM

## 2025-07-25 DIAGNOSIS — K21.9 GASTRO-ESOPHAGEAL REFLUX DISEASE WITHOUT ESOPHAGITIS: ICD-10-CM

## 2025-08-05 ENCOUNTER — APPOINTMENT (OUTPATIENT)
Dept: WOUND CARE | Facility: HOSPITAL | Age: 89
End: 2025-08-05
Payer: MEDICARE

## 2025-08-05 ENCOUNTER — OUTPATIENT (OUTPATIENT)
Dept: OUTPATIENT SERVICES | Facility: HOSPITAL | Age: 89
LOS: 1 days | End: 2025-08-05
Payer: MEDICARE

## 2025-08-05 VITALS
DIASTOLIC BLOOD PRESSURE: 80 MMHG | SYSTOLIC BLOOD PRESSURE: 121 MMHG | OXYGEN SATURATION: 95 % | BODY MASS INDEX: 27.49 KG/M2 | HEART RATE: 60 BPM | WEIGHT: 165 LBS | RESPIRATION RATE: 16 BRPM | HEIGHT: 65 IN | TEMPERATURE: 97.5 F

## 2025-08-05 DIAGNOSIS — R60.0 LOCALIZED EDEMA: ICD-10-CM

## 2025-08-05 DIAGNOSIS — Z98.89 OTHER SPECIFIED POSTPROCEDURAL STATES: Chronic | ICD-10-CM

## 2025-08-05 DIAGNOSIS — Z96.641 PRESENCE OF RIGHT ARTIFICIAL HIP JOINT: Chronic | ICD-10-CM

## 2025-08-05 DIAGNOSIS — R19.00 INTRA-ABDOMINAL AND PELVIC SWELLING, MASS AND LUMP, UNSPECIFIED SITE: Chronic | ICD-10-CM

## 2025-08-05 DIAGNOSIS — Z96.651 PRESENCE OF RIGHT ARTIFICIAL KNEE JOINT: Chronic | ICD-10-CM

## 2025-08-05 DIAGNOSIS — Z90.721 ACQUIRED ABSENCE OF OVARIES, UNILATERAL: Chronic | ICD-10-CM

## 2025-08-05 DIAGNOSIS — H26.493 OTHER SECONDARY CATARACT, BILATERAL: Chronic | ICD-10-CM

## 2025-08-05 DIAGNOSIS — L97.801 NON-PRESSURE CHRONIC ULCER OF OTHER PART OF UNSPECIFIED LOWER LEG LIMITED TO BREAKDOWN OF SKIN: ICD-10-CM

## 2025-08-05 DIAGNOSIS — S81.812D LACERATION W/OUT FOREIGN BODY, LEFT LOWER LEG, SUBSEQUENT ENCOUNTER: ICD-10-CM

## 2025-08-05 DIAGNOSIS — L89.891 PRESSURE ULCER OF OTHER SITE, STAGE 1: ICD-10-CM

## 2025-08-05 PROCEDURE — 99214 OFFICE O/P EST MOD 30 MIN: CPT

## 2025-08-05 PROCEDURE — G0463: CPT

## 2025-08-06 DIAGNOSIS — Z88.1 ALLERGY STATUS TO OTHER ANTIBIOTIC AGENTS: ICD-10-CM

## 2025-08-06 DIAGNOSIS — Z85.831 PERSONAL HISTORY OF MALIGNANT NEOPLASM OF SOFT TISSUE: ICD-10-CM

## 2025-08-06 DIAGNOSIS — M81.0 AGE-RELATED OSTEOPOROSIS WITHOUT CURRENT PATHOLOGICAL FRACTURE: ICD-10-CM

## 2025-08-06 DIAGNOSIS — Z80.9 FAMILY HISTORY OF MALIGNANT NEOPLASM, UNSPECIFIED: ICD-10-CM

## 2025-08-06 DIAGNOSIS — K21.9 GASTRO-ESOPHAGEAL REFLUX DISEASE WITHOUT ESOPHAGITIS: ICD-10-CM
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
## 2025-08-06 DIAGNOSIS — Z98.890 OTHER SPECIFIED POSTPROCEDURAL STATES: ICD-10-CM

## 2025-08-06 DIAGNOSIS — Z82.62 FAMILY HISTORY OF OSTEOPOROSIS: ICD-10-CM

## 2025-08-06 DIAGNOSIS — S81.812D LACERATION WITHOUT FOREIGN BODY, LEFT LOWER LEG, SUBSEQUENT ENCOUNTER: ICD-10-CM

## 2025-08-06 DIAGNOSIS — Z79.899 OTHER LONG TERM (CURRENT) DRUG THERAPY: ICD-10-CM

## 2025-08-06 DIAGNOSIS — Z96.641 PRESENCE OF RIGHT ARTIFICIAL HIP JOINT: ICD-10-CM

## 2025-08-06 DIAGNOSIS — Z87.39 PERSONAL HISTORY OF OTHER DISEASES OF THE MUSCULOSKELETAL SYSTEM AND CONNECTIVE TISSUE: ICD-10-CM

## 2025-08-06 DIAGNOSIS — I48.0 PAROXYSMAL ATRIAL FIBRILLATION: ICD-10-CM

## 2025-08-06 DIAGNOSIS — Z82.61 FAMILY HISTORY OF ARTHRITIS: ICD-10-CM
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
## 2025-08-06 DIAGNOSIS — R60.0 LOCALIZED EDEMA: ICD-10-CM

## 2025-08-06 DIAGNOSIS — Z90.722 ACQUIRED ABSENCE OF OVARIES, BILATERAL: ICD-10-CM
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
## 2025-08-19 ENCOUNTER — OUTPATIENT (OUTPATIENT)
Dept: OUTPATIENT SERVICES | Facility: HOSPITAL | Age: 89
LOS: 1 days | End: 2025-08-19
Payer: MEDICARE

## 2025-08-19 ENCOUNTER — APPOINTMENT (OUTPATIENT)
Dept: WOUND CARE | Facility: HOSPITAL | Age: 89
End: 2025-08-19
Payer: MEDICARE

## 2025-08-19 DIAGNOSIS — Z90.721 ACQUIRED ABSENCE OF OVARIES, UNILATERAL: Chronic | ICD-10-CM

## 2025-08-19 DIAGNOSIS — Z96.651 PRESENCE OF RIGHT ARTIFICIAL KNEE JOINT: Chronic | ICD-10-CM

## 2025-08-19 DIAGNOSIS — R60.0 LOCALIZED EDEMA: ICD-10-CM

## 2025-08-19 DIAGNOSIS — L97.801 NON-PRESSURE CHRONIC ULCER OF OTHER PART OF UNSPECIFIED LOWER LEG LIMITED TO BREAKDOWN OF SKIN: ICD-10-CM

## 2025-08-19 DIAGNOSIS — S81.812D LACERATION W/OUT FOREIGN BODY, LEFT LOWER LEG, SUBSEQUENT ENCOUNTER: ICD-10-CM

## 2025-08-19 DIAGNOSIS — H26.493 OTHER SECONDARY CATARACT, BILATERAL: Chronic | ICD-10-CM

## 2025-08-19 DIAGNOSIS — R19.00 INTRA-ABDOMINAL AND PELVIC SWELLING, MASS AND LUMP, UNSPECIFIED SITE: Chronic | ICD-10-CM

## 2025-08-19 DIAGNOSIS — Z96.641 PRESENCE OF RIGHT ARTIFICIAL HIP JOINT: Chronic | ICD-10-CM

## 2025-08-19 DIAGNOSIS — S51.012A LACERATION W/OUT FOREIGN BODY OF LEFT ELBOW, INITIAL ENCOUNTER: ICD-10-CM

## 2025-08-19 DIAGNOSIS — Z98.89 OTHER SPECIFIED POSTPROCEDURAL STATES: Chronic | ICD-10-CM

## 2025-08-19 PROCEDURE — 99213 OFFICE O/P EST LOW 20 MIN: CPT

## 2025-08-19 PROCEDURE — G0463: CPT

## 2025-08-20 DIAGNOSIS — Z90.722 ACQUIRED ABSENCE OF OVARIES, BILATERAL: ICD-10-CM

## 2025-08-20 DIAGNOSIS — Z82.62 FAMILY HISTORY OF OSTEOPOROSIS: ICD-10-CM

## 2025-08-20 DIAGNOSIS — Z82.61 FAMILY HISTORY OF ARTHRITIS: ICD-10-CM

## 2025-08-20 DIAGNOSIS — K21.9 GASTRO-ESOPHAGEAL REFLUX DISEASE WITHOUT ESOPHAGITIS: ICD-10-CM

## 2025-08-20 DIAGNOSIS — Z88.1 ALLERGY STATUS TO OTHER ANTIBIOTIC AGENTS: ICD-10-CM
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
## 2025-08-20 DIAGNOSIS — M81.0 AGE-RELATED OSTEOPOROSIS WITHOUT CURRENT PATHOLOGICAL FRACTURE: ICD-10-CM

## 2025-08-20 DIAGNOSIS — Z85.831 PERSONAL HISTORY OF MALIGNANT NEOPLASM OF SOFT TISSUE: ICD-10-CM

## 2025-08-20 DIAGNOSIS — Z79.899 OTHER LONG TERM (CURRENT) DRUG THERAPY: ICD-10-CM
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
## 2025-08-20 DIAGNOSIS — Z80.9 FAMILY HISTORY OF MALIGNANT NEOPLASM, UNSPECIFIED: ICD-10-CM

## 2025-08-20 DIAGNOSIS — Z87.39 PERSONAL HISTORY OF OTHER DISEASES OF THE MUSCULOSKELETAL SYSTEM AND CONNECTIVE TISSUE: ICD-10-CM

## 2025-08-20 DIAGNOSIS — S51.012A LACERATION WITHOUT FOREIGN BODY OF LEFT ELBOW, INITIAL ENCOUNTER: ICD-10-CM
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
## 2025-08-20 DIAGNOSIS — Z98.890 OTHER SPECIFIED POSTPROCEDURAL STATES: ICD-10-CM

## 2025-08-20 DIAGNOSIS — Z96.641 PRESENCE OF RIGHT ARTIFICIAL HIP JOINT: ICD-10-CM

## 2025-08-20 DIAGNOSIS — R60.0 LOCALIZED EDEMA: ICD-10-CM

## 2025-08-20 DIAGNOSIS — S81.812D LACERATION WITHOUT FOREIGN BODY, LEFT LOWER LEG, SUBSEQUENT ENCOUNTER: ICD-10-CM

## 2025-08-20 DIAGNOSIS — I48.0 PAROXYSMAL ATRIAL FIBRILLATION: ICD-10-CM
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
## 2025-09-04 ENCOUNTER — APPOINTMENT (OUTPATIENT)
Dept: WOUND CARE | Facility: HOSPITAL | Age: 89
End: 2025-09-04
Payer: MEDICARE

## 2025-09-04 ENCOUNTER — OUTPATIENT (OUTPATIENT)
Dept: OUTPATIENT SERVICES | Facility: HOSPITAL | Age: 89
LOS: 1 days | End: 2025-09-04
Payer: MEDICARE

## 2025-09-04 DIAGNOSIS — Z96.651 PRESENCE OF RIGHT ARTIFICIAL KNEE JOINT: Chronic | ICD-10-CM

## 2025-09-04 DIAGNOSIS — Z98.89 OTHER SPECIFIED POSTPROCEDURAL STATES: Chronic | ICD-10-CM

## 2025-09-04 DIAGNOSIS — R60.0 LOCALIZED EDEMA: ICD-10-CM

## 2025-09-04 DIAGNOSIS — S51.012D LACERATION W/OUT FOREIGN BODY OF LEFT ELBOW, SUBSEQUENT ENCOUNTER: ICD-10-CM

## 2025-09-04 DIAGNOSIS — H26.493 OTHER SECONDARY CATARACT, BILATERAL: Chronic | ICD-10-CM

## 2025-09-04 DIAGNOSIS — Z90.721 ACQUIRED ABSENCE OF OVARIES, UNILATERAL: Chronic | ICD-10-CM

## 2025-09-04 DIAGNOSIS — Z96.641 PRESENCE OF RIGHT ARTIFICIAL HIP JOINT: Chronic | ICD-10-CM

## 2025-09-04 DIAGNOSIS — S81.812D LACERATION W/OUT FOREIGN BODY, LEFT LOWER LEG, SUBSEQUENT ENCOUNTER: ICD-10-CM

## 2025-09-04 DIAGNOSIS — R19.00 INTRA-ABDOMINAL AND PELVIC SWELLING, MASS AND LUMP, UNSPECIFIED SITE: Chronic | ICD-10-CM

## 2025-09-04 DIAGNOSIS — S81.812D LACERATION WITHOUT FOREIGN BODY, LEFT LOWER LEG, SUBSEQUENT ENCOUNTER: ICD-10-CM

## 2025-09-04 PROCEDURE — G0463: CPT

## 2025-09-04 PROCEDURE — 99213 OFFICE O/P EST LOW 20 MIN: CPT

## 2025-09-05 DIAGNOSIS — Z80.9 FAMILY HISTORY OF MALIGNANT NEOPLASM, UNSPECIFIED: ICD-10-CM

## 2025-09-05 DIAGNOSIS — Z88.1 ALLERGY STATUS TO OTHER ANTIBIOTIC AGENTS: ICD-10-CM

## 2025-09-05 DIAGNOSIS — S51.012A LACERATION WITHOUT FOREIGN BODY OF LEFT ELBOW, INITIAL ENCOUNTER: ICD-10-CM

## 2025-09-05 DIAGNOSIS — Z85.831 PERSONAL HISTORY OF MALIGNANT NEOPLASM OF SOFT TISSUE: ICD-10-CM

## 2025-09-05 DIAGNOSIS — Z87.39 PERSONAL HISTORY OF OTHER DISEASES OF THE MUSCULOSKELETAL SYSTEM AND CONNECTIVE TISSUE: ICD-10-CM

## 2025-09-05 DIAGNOSIS — K21.9 GASTRO-ESOPHAGEAL REFLUX DISEASE WITHOUT ESOPHAGITIS: ICD-10-CM
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
## 2025-09-05 DIAGNOSIS — S81.812D LACERATION WITHOUT FOREIGN BODY, LEFT LOWER LEG, SUBSEQUENT ENCOUNTER: ICD-10-CM

## 2025-09-05 DIAGNOSIS — M81.0 AGE-RELATED OSTEOPOROSIS WITHOUT CURRENT PATHOLOGICAL FRACTURE: ICD-10-CM
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
## 2025-09-05 DIAGNOSIS — Z82.62 FAMILY HISTORY OF OSTEOPOROSIS: ICD-10-CM

## 2025-09-05 DIAGNOSIS — R60.0 LOCALIZED EDEMA: ICD-10-CM

## 2025-09-05 DIAGNOSIS — Z79.899 OTHER LONG TERM (CURRENT) DRUG THERAPY: ICD-10-CM
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
## 2025-09-05 DIAGNOSIS — Z96.641 PRESENCE OF RIGHT ARTIFICIAL HIP JOINT: ICD-10-CM
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
## 2025-09-05 DIAGNOSIS — I48.0 PAROXYSMAL ATRIAL FIBRILLATION: ICD-10-CM

## 2025-09-05 DIAGNOSIS — Z90.722 ACQUIRED ABSENCE OF OVARIES, BILATERAL: ICD-10-CM

## 2025-09-05 DIAGNOSIS — Z82.61 FAMILY HISTORY OF ARTHRITIS: ICD-10-CM

## 2025-09-05 DIAGNOSIS — Z98.890 OTHER SPECIFIED POSTPROCEDURAL STATES: ICD-10-CM

## 2025-09-18 ENCOUNTER — APPOINTMENT (OUTPATIENT)
Dept: WOUND CARE | Facility: HOSPITAL | Age: 89
End: 2025-09-18
Payer: MEDICARE

## 2025-09-18 VITALS
HEIGHT: 65 IN | WEIGHT: 165 LBS | HEART RATE: 58 BPM | OXYGEN SATURATION: 95 % | TEMPERATURE: 97.7 F | SYSTOLIC BLOOD PRESSURE: 119 MMHG | BODY MASS INDEX: 27.49 KG/M2 | RESPIRATION RATE: 16 BRPM | DIASTOLIC BLOOD PRESSURE: 78 MMHG

## 2025-09-18 DIAGNOSIS — S51.012D LACERATION W/OUT FOREIGN BODY OF LEFT ELBOW, SUBSEQUENT ENCOUNTER: ICD-10-CM

## 2025-09-18 DIAGNOSIS — R60.0 LOCALIZED EDEMA: ICD-10-CM

## 2025-09-18 DIAGNOSIS — S81.812D LACERATION W/OUT FOREIGN BODY, LEFT LOWER LEG, SUBSEQUENT ENCOUNTER: ICD-10-CM

## 2025-09-18 PROCEDURE — 99213 OFFICE O/P EST LOW 20 MIN: CPT
